# Patient Record
Sex: FEMALE | Race: WHITE | Employment: UNEMPLOYED | URBAN - METROPOLITAN AREA
[De-identification: names, ages, dates, MRNs, and addresses within clinical notes are randomized per-mention and may not be internally consistent; named-entity substitution may affect disease eponyms.]

---

## 2017-03-22 NOTE — TELEPHONE ENCOUNTER
Pending Prescriptions Disp Refills    PANTOPRAZOLE SODIUM 40 MG Oral Tab EC [Pharmacy Med Name: PANTOPRAZOLE 40MG TABLETS] 30 tablet 0     Sig: TAKE 1 TABLET BY MOUTH DAILY         See refill request  Patient last seen 5-27-16.    Medication last refilled

## 2017-03-24 RX ORDER — PANTOPRAZOLE SODIUM 40 MG/1
TABLET, DELAYED RELEASE ORAL
Qty: 30 TABLET | Refills: 3 | Status: SHIPPED | OUTPATIENT
Start: 2017-03-24 | End: 2017-10-08

## 2017-04-07 ENCOUNTER — TELEPHONE (OUTPATIENT)
Dept: GASTROENTEROLOGY | Facility: CLINIC | Age: 57
End: 2017-04-07

## 2017-04-07 NOTE — TELEPHONE ENCOUNTER
Pt. States that she is having abdominal pain, pressure on R side of Stomach and burning sensation for the past 2 weeks. Pt. Wants to know what should she do?

## 2017-04-10 ENCOUNTER — OFFICE VISIT (OUTPATIENT)
Dept: INTERNAL MEDICINE CLINIC | Facility: CLINIC | Age: 57
End: 2017-04-10

## 2017-04-10 ENCOUNTER — LAB ENCOUNTER (OUTPATIENT)
Dept: LAB | Facility: HOSPITAL | Age: 57
End: 2017-04-10
Attending: INTERNAL MEDICINE
Payer: COMMERCIAL

## 2017-04-10 VITALS
WEIGHT: 166.63 LBS | DIASTOLIC BLOOD PRESSURE: 72 MMHG | BODY MASS INDEX: 29.52 KG/M2 | SYSTOLIC BLOOD PRESSURE: 114 MMHG | TEMPERATURE: 99 F | HEIGHT: 63 IN | HEART RATE: 66 BPM

## 2017-04-10 DIAGNOSIS — G47.30 SLEEP APNEA, UNSPECIFIED TYPE: ICD-10-CM

## 2017-04-10 DIAGNOSIS — Z00.00 ANNUAL PHYSICAL EXAM: Primary | ICD-10-CM

## 2017-04-10 DIAGNOSIS — Z00.00 ANNUAL PHYSICAL EXAM: ICD-10-CM

## 2017-04-10 PROCEDURE — 36415 COLL VENOUS BLD VENIPUNCTURE: CPT

## 2017-04-10 PROCEDURE — 84443 ASSAY THYROID STIM HORMONE: CPT

## 2017-04-10 PROCEDURE — 80061 LIPID PANEL: CPT

## 2017-04-10 PROCEDURE — 85025 COMPLETE CBC W/AUTO DIFF WBC: CPT

## 2017-04-10 PROCEDURE — 82306 VITAMIN D 25 HYDROXY: CPT

## 2017-04-10 PROCEDURE — 99396 PREV VISIT EST AGE 40-64: CPT | Performed by: INTERNAL MEDICINE

## 2017-04-10 PROCEDURE — 80053 COMPREHEN METABOLIC PANEL: CPT

## 2017-04-10 NOTE — PROGRESS NOTES
HPI:    Patient ID: Will Ling is a 61year old female. HPI  Annual Physical Exam  Patient with hx of GERD presents for an annual physical exam. She c/o low back pain, and lumbar disc bulging was noted on her MRI lumbar spine.  Currently taking Tramadol Pain. Disp: 30 tablet Rfl: 0   Pantoprazole Sodium (PROTONIX) 40 MG Oral Tab EC Take 1 tablet by mouth every morning before breakfast. Disp: 30 tablet Rfl: 1     Allergies:No Known Allergies   PHYSICAL EXAM:   Physical Exam   Constitutional: She is oriente mild left and moderate right neural foraminal narrowing. L4-L5:   Mild diffuse disc bulge, facet degenerative changes and ligamentum flavum hypertrophy causes mild flattening of the ventral thecal sac and mild right neural foraminal narrowing.  No signific

## 2017-04-11 NOTE — TELEPHONE ENCOUNTER
Pt contacted and she states that for the last 2 weeks she has had moderate abdominal pain, bloating, pressure, and nausea. She states it is a dull intermittent pain on the right side of her abdomen radiating to her back.  Denies fever, vomiting, constipatio

## 2017-04-12 ENCOUNTER — OFFICE VISIT (OUTPATIENT)
Dept: GASTROENTEROLOGY | Facility: CLINIC | Age: 57
End: 2017-04-12

## 2017-04-12 VITALS
HEART RATE: 77 BPM | BODY MASS INDEX: 28.67 KG/M2 | SYSTOLIC BLOOD PRESSURE: 134 MMHG | WEIGHT: 170 LBS | HEIGHT: 64.5 IN | DIASTOLIC BLOOD PRESSURE: 83 MMHG

## 2017-04-12 DIAGNOSIS — R10.11 RUQ ABDOMINAL PAIN: Primary | ICD-10-CM

## 2017-04-12 DIAGNOSIS — R10.13 EPIGASTRIC ABDOMINAL PAIN: ICD-10-CM

## 2017-04-12 DIAGNOSIS — R74.8 ABNORMAL TRANSAMINASES: ICD-10-CM

## 2017-04-12 DIAGNOSIS — R11.0 NAUSEA: ICD-10-CM

## 2017-04-12 DIAGNOSIS — R14.0 ABDOMINAL BLOATING: ICD-10-CM

## 2017-04-12 PROCEDURE — 99212 OFFICE O/P EST SF 10 MIN: CPT | Performed by: INTERNAL MEDICINE

## 2017-04-12 PROCEDURE — 99244 OFF/OP CNSLTJ NEW/EST MOD 40: CPT | Performed by: INTERNAL MEDICINE

## 2017-04-12 RX ORDER — CIPROFLOXACIN 500 MG/1
500 TABLET, FILM COATED ORAL 2 TIMES DAILY
Qty: 14 TABLET | Refills: 0 | Status: SHIPPED | OUTPATIENT
Start: 2017-04-12 | End: 2017-04-19

## 2017-04-12 RX ORDER — METRONIDAZOLE 500 MG/1
500 TABLET ORAL EVERY 8 HOURS
Qty: 21 TABLET | Refills: 0 | Status: SHIPPED | OUTPATIENT
Start: 2017-04-12 | End: 2017-04-19

## 2017-04-12 NOTE — PROGRESS NOTES
HPI:    Patient ID: Sravan Reeder returns today for follow-up. Overall, she states that she is suffering about the same symptoms.     Most concerned with an epigastric discomfort that radiates around the base of the right rib cage to the right upper quadran appt regarding her sxs. Per Erwin Danielson OK to add 4/12/17 @12:30pm, pt confirmed appt.              ======================  Previous visit May 27th 2016:    Clyde Mcardle returns for follow-up of another episode of intense right sided abdominal pain.   This is sim All    Message left for pt to call back.                   Moris Fisher MD at 4/21/2016  7:56 PM      Status: Signed : Moris Fisher MD (Physician)     Expand All Collapse All    GI RNs–  these complaints have been repeatedly \"pinching\" pain. This pain is aggravated by fasting. She is avoiding spices and has stopped drinking coffee. Unclear if those things aggravated this.     No nausea or vomiting.      ============================    Call Documentation      [facility] [employer] continues. Some control on the pantoprazole which is only partial.  She takes the pantoprazole early in the mornings. Some nocturnal symptoms. She is watching diet and trying to push water. No dysphagia.     As below, nonsmoker.    ===================== cholecystectomy in May 2014     MRCP FINDINGS:   Motion artifact is present on multiple sequences, severely affecting the   postcontrast sequence image quality. GALLBLADDER: Absent.    BILE DUCTS: Common bile duct measures approximately 6-7 mm near the DIAGNOSES:  1. Right upper quadrant pain. 2. Burning acid dyspepsia. POSTOPERATIVE DIAGNOSIS: Nonspecific gastritis. SURGEON: Desmond Gutiérrez MD    SEDATION:  1. Benzocaine topical spray to the oropharynx before the procedure.   2. Fentanyl 75 m MOUTH DAILY Disp: 30 tablet Rfl: 3   TraMADol HCl 50 MG Oral Tab Take 1 tablet (50 mg total) by mouth 2 (two) times daily as needed for Pain.  Disp: 30 tablet Rfl: 0     Allergies:No Known Allergies   PHYSICAL EXAM:   Physical Exam   Constitutional: She is liver sounds like it is at least a component here. Normal kidney but some concern for proteinuria. Burning epigastric abdominal pain continues, partially improved on pantoprazole. No dysphagia.     Reassuring EGD examination with biopsies April 25, 201 Continue to watch closely. Further hepatitis workup to follow if LFTs remain elevated. 6.  Last colonoscopy examination 2009 apparently unremarkable. Our next test would be a repeat colonoscopy exam if no further improvement.         Over 25 minutes sp

## 2017-04-13 ENCOUNTER — TELEPHONE (OUTPATIENT)
Dept: GASTROENTEROLOGY | Facility: CLINIC | Age: 57
End: 2017-04-13

## 2017-04-13 NOTE — TELEPHONE ENCOUNTER
Pt is made aware of authorization for MRI/MRCP not being completed yet and that she will receive call from [facility] dept once it is authorized; pt verbalized understanding of this.

## 2017-04-20 ENCOUNTER — TELEPHONE (OUTPATIENT)
Dept: GASTROENTEROLOGY | Facility: CLINIC | Age: 57
End: 2017-04-20

## 2017-04-20 NOTE — TELEPHONE ENCOUNTER
Pt called to check on status of prior auth for MRI/MRCP. She states that she spoke to managed care and was told that they did not handle this and to call this office. Please call.   Pt is very anxious to schedule test.

## 2017-04-20 NOTE — TELEPHONE ENCOUNTER
Routed to [place] in [facility] care who was working on this. States pending in referral.  Want to know there is anything RNs need to do?

## 2017-04-21 NOTE — TELEPHONE ENCOUNTER
Pt is notified re: authorization that is still pending due to insurance co.; she is made aware that she will be notified with approval once this occurs; pt verbalized understanding of this.

## 2017-04-21 NOTE — TELEPHONE ENCOUNTER
Confirmed [facility] is working on this. Pt was informed incorrectly. [facility] in [facility] care states precert is pending with [employer] (insurance diagnostic precert line). Managed care WILL call pt once approval is obtained.   Await determination from insurance

## 2017-04-27 NOTE — TELEPHONE ENCOUNTER
Per Estrella Rangel in managed care, MRI/MRCP was approved to be done at [address] [place] [address]; authorization expires on 5/14/17 and pt has appt scheduled for 5/8/17.

## 2017-05-08 ENCOUNTER — HOSPITAL ENCOUNTER (OUTPATIENT)
Dept: MRI IMAGING | Facility: HOSPITAL | Age: 57
Discharge: HOME | End: 2017-05-08
Attending: INTERNAL MEDICINE
Payer: COMMERCIAL

## 2017-05-08 DIAGNOSIS — R10.11 RUQ ABDOMINAL PAIN: ICD-10-CM

## 2017-05-08 DIAGNOSIS — R74.8 ABNORMAL TRANSAMINASES: ICD-10-CM

## 2017-05-08 PROCEDURE — 74183 MRI ABD W/O CNTR FLWD CNTR: CPT | Performed by: INTERNAL MEDICINE

## 2017-05-08 PROCEDURE — A9575 INJ GADOTERATE MEGLUMI 0.1ML: HCPCS | Performed by: INTERNAL MEDICINE

## 2017-05-15 ENCOUNTER — TELEPHONE (OUTPATIENT)
Dept: GASTROENTEROLOGY | Facility: CLINIC | Age: 57
End: 2017-05-15

## 2017-05-15 NOTE — TELEPHONE ENCOUNTER
Pt contacted and reviewed Dr. Orion Monson message below, she verbalized understanding. She states the the ax from 4/12/17 did NOT help her sxs and she is still experiencing \"swelling and pain\" in the LUQ and pressure.      She is agreeable to repeating LFT

## 2017-05-15 NOTE — TELEPHONE ENCOUNTER
GI RNs–  Please call and advise MsJeanette Darien Rebecca that the MRI scan of 5/8/2017 looked good. No tumors in her liver. Likely fatty liver only. No gallstones seen in her bile ducts.   There may be mild pressure or dilation in her bile ducts of uncertain significanc

## 2017-06-20 ENCOUNTER — APPOINTMENT (OUTPATIENT)
Dept: LAB | Facility: HOSPITAL | Age: 57
End: 2017-06-20
Attending: OBSTETRICS & GYNECOLOGY
Payer: COMMERCIAL

## 2017-06-20 ENCOUNTER — OFFICE VISIT (OUTPATIENT)
Dept: OBGYN CLINIC | Facility: CLINIC | Age: 57
End: 2017-06-20

## 2017-06-20 VITALS
DIASTOLIC BLOOD PRESSURE: 85 MMHG | SYSTOLIC BLOOD PRESSURE: 129 MMHG | WEIGHT: 170 LBS | BODY MASS INDEX: 28.32 KG/M2 | HEIGHT: 65 IN

## 2017-06-20 DIAGNOSIS — R30.0 DYSURIA: ICD-10-CM

## 2017-06-20 DIAGNOSIS — Z01.419 ENCOUNTER FOR GYNECOLOGICAL EXAMINATION WITHOUT ABNORMAL FINDING: ICD-10-CM

## 2017-06-20 DIAGNOSIS — D25.0 SUBMUCOUS LEIOMYOMA OF UTERUS: ICD-10-CM

## 2017-06-20 DIAGNOSIS — Z12.31 VISIT FOR SCREENING MAMMOGRAM: Primary | ICD-10-CM

## 2017-06-20 PROCEDURE — 81003 URINALYSIS AUTO W/O SCOPE: CPT

## 2017-06-20 PROCEDURE — 87086 URINE CULTURE/COLONY COUNT: CPT

## 2017-06-20 PROCEDURE — 99396 PREV VISIT EST AGE 40-64: CPT | Performed by: OBSTETRICS & GYNECOLOGY

## 2017-06-20 NOTE — PROGRESS NOTES
HPI:    Patient ID: Gurvinder Sparks is a 61year old female. HPI  Patient here for routine exam.  Reports pelvic pressure recently and urge to urinate frequently. Discussed obtaining U/A and C & S as well as pelvic U/S. Patient has a H/O fibroid uterus.   L Fibroids. Size ~ 12 weeks. ASSESSMENT/PLAN:   Visit for screening mammogram  (primary encounter diagnosis)  Submucous leiomyoma of uterus  Dysuria  Encounter for gynecological examination without abnormal finding [z01.419]   F/U Labs.   Carlos Alberto Harrison

## 2017-06-24 ENCOUNTER — TELEPHONE (OUTPATIENT)
Dept: OBGYN CLINIC | Facility: CLINIC | Age: 57
End: 2017-06-24

## 2017-06-24 NOTE — TELEPHONE ENCOUNTER
Pt had urine test done by dr Smith April on 6/20/17 in [facility]  Is calling about results

## 2017-07-10 ENCOUNTER — HOSPITAL ENCOUNTER (OUTPATIENT)
Dept: ULTRASOUND IMAGING | Facility: HOSPITAL | Age: 57
Discharge: HOME | End: 2017-07-10
Attending: OBSTETRICS & GYNECOLOGY
Payer: COMMERCIAL

## 2017-07-10 ENCOUNTER — HOSPITAL ENCOUNTER (OUTPATIENT)
Dept: MAMMOGRAPHY | Facility: HOSPITAL | Age: 57
Discharge: HOME | End: 2017-07-10
Attending: OBSTETRICS & GYNECOLOGY
Payer: COMMERCIAL

## 2017-07-10 DIAGNOSIS — Z12.31 VISIT FOR SCREENING MAMMOGRAM: ICD-10-CM

## 2017-07-10 DIAGNOSIS — D25.0 SUBMUCOUS LEIOMYOMA OF UTERUS: ICD-10-CM

## 2017-07-10 PROCEDURE — 76856 US EXAM PELVIC COMPLETE: CPT | Performed by: OBSTETRICS & GYNECOLOGY

## 2017-07-10 PROCEDURE — 76830 TRANSVAGINAL US NON-OB: CPT | Performed by: OBSTETRICS & GYNECOLOGY

## 2017-07-10 PROCEDURE — 77067 SCR MAMMO BI INCL CAD: CPT | Performed by: OBSTETRICS & GYNECOLOGY

## 2017-07-28 ENCOUNTER — OFFICE VISIT (OUTPATIENT)
Dept: GASTROENTEROLOGY | Facility: CLINIC | Age: 57
End: 2017-07-28

## 2017-07-28 VITALS
SYSTOLIC BLOOD PRESSURE: 104 MMHG | HEART RATE: 79 BPM | HEIGHT: 63 IN | DIASTOLIC BLOOD PRESSURE: 71 MMHG | BODY MASS INDEX: 30.48 KG/M2 | WEIGHT: 172 LBS

## 2017-07-28 DIAGNOSIS — R74.8 ABNORMAL TRANSAMINASES: Primary | ICD-10-CM

## 2017-07-28 DIAGNOSIS — K75.81 NONALCOHOLIC STEATOHEPATITIS (NASH): ICD-10-CM

## 2017-07-28 DIAGNOSIS — R14.0 ABDOMINAL BLOATING: ICD-10-CM

## 2017-07-28 PROCEDURE — 99212 OFFICE O/P EST SF 10 MIN: CPT | Performed by: INTERNAL MEDICINE

## 2017-07-28 PROCEDURE — 99214 OFFICE O/P EST MOD 30 MIN: CPT | Performed by: INTERNAL MEDICINE

## 2017-07-28 NOTE — PROGRESS NOTES
HPI:    Patient ID: Mariama Cook returns today for follow-up to discuss recent MRI and labs. [facility] actually is doing a bit better. She states that she has made changes to her diet.   Previous complaints of burning epigastric discomfort and abdominal bloat at times. Symptoms are relieved by taking smaller portions with meals. There is a third complaint of a more lower abdominal bloating discomfort at night. This is only nocturnal.    This syndrome of burning pain and bloating comes in episodes.   It is not acknowledge being all that bloated or distended when this occurred. Normal bowel patterns during the episode. No constipation or diarrhea. This lasted for about 2 weeks. She cut back considerably on her diet.   This seemed to resolve spontaneous Courtney:  Pt stats that she has been having intermittent RUQ pain for the past 2 weeks.  She also has mid abd \"burning\" after meals.  She states you saw her in the past for this and is taking Pantoprazole.  She would like to be seen soon.  She was advised Marsha Taylor at 12/14/2015  8:58 AM      Status: Signed : Nathanael Garduno     Expand All Collapse All    Pt states she is having abdominal pain on right side for last 2 wks and she is also having heartburn.  Pt is asking for an appt sooner than first av Negative intraoperative cholangiogram.  She made an uneventful recovery. Ms. Tuyet Malin comes in for evaluation of about 1 month of burning, pinching epigastric right upper quadrant and diffuse upper abdominal discomfort and bloating.   No obvious aggravating intraoperative cholangiogram performed   at the time of cholecystectomy and appears separate   from the biliary ductal system on multiple images.  The   post contrast sequences are limited by motion artifact   but no definite internal enhancement is identif Pancreatic ductal dilatation. [this is a typo - d/w Dr Miranda Abdalla 7/28/17 - was meant to read NO panc ductal dilation.]  No divisum. Normal intrinsic T1 signal of the pancreas, without atrophy, enhancing lesion, or evidence of acute pancreatitis.      OTHER Desmond Allison MD    SEDATION:  1. Benzocaine topical spray to the oropharynx before the procedure. 2. Fentanyl 75 mcg and midazolam 4 mg given intravenously before the  procedure.       EGD FINDINGS: The esophagus was healthy and normal down to th MOUTH DAILY Disp: 30 tablet Rfl: 3     Allergies:No Known Allergies   PHYSICAL EXAM:   Physical Exam   Constitutional: She is oriented to person, place, and time. She appears well-developed. Well-dressed. Looks great. HENT:   Head: Normocephalic.    Ey here.  Normal kidney but some concern for proteinuria. Burning epigastric abdominal pain continues, partially improved on pantoprazole. No dysphagia. Reassuring EGD examination with biopsies April 25, 2015. No H. pylori.     Returns for follow-up Ibis (7/27/2017). 6.  Continue to monitor LFTs once or twice per year. Suspect fatty liver developing into QUIROZ. Continue to watch closely. Further hepatitis workup to follow if LFTs remain elevated.     Monitor weights, consider referral to bariatric clin

## 2017-08-14 ENCOUNTER — OFFICE VISIT (OUTPATIENT)
Dept: OBGYN CLINIC | Facility: CLINIC | Age: 57
End: 2017-08-14

## 2017-08-14 VITALS — SYSTOLIC BLOOD PRESSURE: 108 MMHG | DIASTOLIC BLOOD PRESSURE: 64 MMHG

## 2017-08-14 DIAGNOSIS — R35.0 URINARY FREQUENCY: ICD-10-CM

## 2017-08-14 DIAGNOSIS — D25.1 INTRAMURAL LEIOMYOMA OF UTERUS: Primary | ICD-10-CM

## 2017-08-14 DIAGNOSIS — R10.2 PELVIC PAIN IN FEMALE: ICD-10-CM

## 2017-08-14 PROCEDURE — 99214 OFFICE O/P EST MOD 30 MIN: CPT | Performed by: OBSTETRICS & GYNECOLOGY

## 2017-08-14 RX ORDER — TRAMADOL HYDROCHLORIDE 50 MG/1
50 TABLET ORAL 2 TIMES DAILY PRN
Qty: 30 TABLET | Refills: 0 | Status: SHIPPED | COMMUNITY
Start: 2017-08-14 | End: 2018-04-16

## 2017-08-14 NOTE — PROGRESS NOTES
HPI:    Patient ID: Victorino Bay is a 61year old female. HPI    Patient here for F/U visit to discuss pelvic U/S results. U/S shows a large 6-7 cm fibroid uterus. Patient's symptoms of pelvic pressure and pain as well as urinary frequency continue.   U/ detail. To See Urologist for evaluation and possible treatment. Medication refilled and reviewed. No orders of the defined types were placed in this encounter.       Meds This Visit:  Signed Prescriptions Disp Refills    TraMADol HCl 50 MG Oral Tab 30

## 2017-08-29 ENCOUNTER — TELEPHONE (OUTPATIENT)
Dept: GASTROENTEROLOGY | Facility: CLINIC | Age: 57
End: 2017-08-29

## 2017-08-29 NOTE — TELEPHONE ENCOUNTER
Mailed letter to patient notifying him of overdue labs (Hepatic function test) Ordered 7-28-17. Overdue letter mailed to patient.

## 2017-10-09 NOTE — TELEPHONE ENCOUNTER
Pending Prescriptions Disp Refills    PANTOPRAZOLE SODIUM 40 MG Oral Tab EC [Pharmacy Med Name: PANTOPRAZOLE 40MG TABLETS] 30 tablet 0     Sig: TAKE 1 TABLET BY MOUTH DAILY         See refill request  Patient last seen 7-28-17.    Medication last refilled 3-24-17

## 2017-10-10 RX ORDER — PANTOPRAZOLE SODIUM 40 MG/1
TABLET, DELAYED RELEASE ORAL
Qty: 30 TABLET | Refills: 11 | Status: SHIPPED | OUTPATIENT
Start: 2017-10-10 | End: 2020-08-05

## 2017-10-16 ENCOUNTER — APPOINTMENT (OUTPATIENT)
Dept: LAB | Facility: HOSPITAL | Age: 57
End: 2017-10-16
Attending: INTERNAL MEDICINE
Payer: COMMERCIAL

## 2017-10-16 DIAGNOSIS — R74.8 ABNORMAL TRANSAMINASES: ICD-10-CM

## 2017-10-16 PROCEDURE — 36415 COLL VENOUS BLD VENIPUNCTURE: CPT

## 2017-10-16 PROCEDURE — 80076 HEPATIC FUNCTION PANEL: CPT

## 2017-11-01 ENCOUNTER — TELEPHONE (OUTPATIENT)
Dept: GASTROENTEROLOGY | Facility: CLINIC | Age: 57
End: 2017-11-01

## 2017-11-01 DIAGNOSIS — R79.89 ELEVATED LFTS: Primary | ICD-10-CM

## 2017-11-01 NOTE — TELEPHONE ENCOUNTER
Dr. Kash Nunez note from 7/28/17    \"Continue to monitor LFTs once or twice per year. Suspect fatty liver developing into QUIROZ. Continue to watch closely. Further hepatitis workup to follow if LFTs remain elevated. \"    \"Monitor weights, consider referral

## 2017-12-04 ENCOUNTER — OFFICE VISIT (OUTPATIENT)
Dept: GASTROENTEROLOGY | Facility: CLINIC | Age: 57
End: 2017-12-04

## 2017-12-04 VITALS
BODY MASS INDEX: 29.67 KG/M2 | HEIGHT: 64 IN | SYSTOLIC BLOOD PRESSURE: 114 MMHG | WEIGHT: 173.81 LBS | DIASTOLIC BLOOD PRESSURE: 78 MMHG

## 2017-12-04 DIAGNOSIS — R10.13 DYSPEPSIA: ICD-10-CM

## 2017-12-04 DIAGNOSIS — R14.0 ABDOMINAL BLOATING: ICD-10-CM

## 2017-12-04 DIAGNOSIS — K75.81 NONALCOHOLIC STEATOHEPATITIS (NASH): Primary | ICD-10-CM

## 2017-12-04 PROCEDURE — 99212 OFFICE O/P EST SF 10 MIN: CPT | Performed by: INTERNAL MEDICINE

## 2017-12-04 PROCEDURE — 99214 OFFICE O/P EST MOD 30 MIN: CPT | Performed by: INTERNAL MEDICINE

## 2017-12-04 NOTE — PROGRESS NOTES
HPI:    Patient ID: Clarisa Smith returns today for follow-up. Fredo Vidales looks and sounds just beautiful today. She is just coming off of a vacation and looks like she had a nice time. She has been feeling better.   The abdominal symptoms, bloating continue 4/12/2017:    Monica Korin returns today for follow-up. Overall, she states that she is suffering about the same symptoms.     Most concerned with an epigastric discomfort that radiates around the base of the right rib cage to the right upper quadrant and Jasmyn Arrieta OK to add 4/12/17 @12:30pm, pt confirmed appt.    ======================  Previous visit May 27th 2016:    Vernon Beckham returns for follow-up of another episode of intense right sided abdominal pain. This is similar to previous episodes.     Ms. Rashel Padilla back.                   Devika Mcconnell MD at 4/21/2016  7:56 PM      Status: Signed : Devika Mcconnell MD (Physician)     Expand All Collapse All    GI RNs–  these complaints have been repeatedly discussed in the office and repeat aggravated by fasting. She is avoiding spices and has stopped drinking coffee. Unclear if those things aggravated this.     No nausea or vomiting.      ============================    Call Documentation      Rudi Schilder, RN at 12/14/2015  9:18 AM      pantoprazole which is only partial.  She takes the pantoprazole early in the mornings. Some nocturnal symptoms. She is watching diet and trying to push water. No dysphagia.     As below, nonsmoker.    =============================    Previous office visi MRCP FINDINGS:   Motion artifact is present on multiple sequences, severely affecting the   postcontrast sequence image quality. GALLBLADDER: Absent. BILE DUCTS: Common bile duct measures approximately 6-7 mm near the   chepe hepatis.  This tapers t [facility], [facility]. [facility], MRI MRCP ABDOMEN [address], 12/11/2014, 8:48. INDICATIONS:            Intermittent right upper quadrant abdominal pain for one month. Abnormal AST/ALT.  Post cholecystectomy May 2014 enlarged up to 8 mm. The appearance is very similar to prior MRI/MRCP from   12/11/14 suggesting chronic age-related and post cholecystectomy change. No choledocholithiasis.   2.  Stable cystic focus within the right hepatic lobe near the chepe hepatis wh likely cause for this patient's  right upper quadrant pain. D: 04/25/201510:32 A  T: 04/25/2015 4:22 P    ATTENDING: hCarmaine Sow MD      PATH: Biopsies and MARVIN test negative for H. Pylori      Abdominal Pain     Bloating   Associated symptoms cholecystectomy with intraoperative cholangiogram May 22, 2014 for symptomatic cholelithiasis.   Negative intraoperative cholangiogram.  She was seen 12/18/14 for about 1 month of burning, pinching epigastric and right upper quadrant abdominal pain, diffuse · NO Pancreatic ductal dilation. · Fatty liver. · Stable unchanged right lobe lesion possible hepatic cyst    Pelvic transvaginal ultrasound 7/10/2017 shows large 6+cm uterine fibroid    Returns for follow-up 7/27/2017 feeling subjectively better.   Lisa Mcbride

## 2017-12-04 NOTE — PROGRESS NOTES
no h/o head & neck cancers  no h/o difficult intubations  no h/o malignant hyperthermia  no psuedocholinesterase deficiencies  no h/o active illicit drug use/opioid abuse  no home O2  no h/o sleep apnea  no active alcoholics   no dialysis patients  no si

## 2018-04-16 ENCOUNTER — OFFICE VISIT (OUTPATIENT)
Dept: INTERNAL MEDICINE CLINIC | Facility: CLINIC | Age: 58
End: 2018-04-16

## 2018-04-16 ENCOUNTER — LAB ENCOUNTER (OUTPATIENT)
Dept: LAB | Facility: HOSPITAL | Age: 58
End: 2018-04-16
Attending: INTERNAL MEDICINE
Payer: COMMERCIAL

## 2018-04-16 VITALS
WEIGHT: 174 LBS | HEIGHT: 64 IN | TEMPERATURE: 98 F | SYSTOLIC BLOOD PRESSURE: 109 MMHG | BODY MASS INDEX: 29.71 KG/M2 | HEART RATE: 76 BPM | DIASTOLIC BLOOD PRESSURE: 75 MMHG

## 2018-04-16 DIAGNOSIS — M51.26 PROLAPSE OF LUMBAR INTERVERTEBRAL DISC WITHOUT RADICULOPATHY: Primary | ICD-10-CM

## 2018-04-16 DIAGNOSIS — Z00.00 ANNUAL PHYSICAL EXAM: ICD-10-CM

## 2018-04-16 DIAGNOSIS — K75.81 NONALCOHOLIC STEATOHEPATITIS (NASH): ICD-10-CM

## 2018-04-16 DIAGNOSIS — R73.03 PREDIABETES: ICD-10-CM

## 2018-04-16 DIAGNOSIS — R79.89 ELEVATED LFTS: ICD-10-CM

## 2018-04-16 PROCEDURE — 80050 GENERAL HEALTH PANEL: CPT

## 2018-04-16 PROCEDURE — 80053 COMPREHEN METABOLIC PANEL: CPT

## 2018-04-16 PROCEDURE — 82306 VITAMIN D 25 HYDROXY: CPT

## 2018-04-16 PROCEDURE — 82248 BILIRUBIN DIRECT: CPT

## 2018-04-16 PROCEDURE — 80061 LIPID PANEL: CPT

## 2018-04-16 PROCEDURE — 36415 COLL VENOUS BLD VENIPUNCTURE: CPT

## 2018-04-16 PROCEDURE — 99396 PREV VISIT EST AGE 40-64: CPT | Performed by: INTERNAL MEDICINE

## 2018-04-16 PROCEDURE — 83036 HEMOGLOBIN GLYCOSYLATED A1C: CPT

## 2018-04-16 PROCEDURE — 85025 COMPLETE CBC W/AUTO DIFF WBC: CPT

## 2018-04-16 PROCEDURE — 81015 MICROSCOPIC EXAM OF URINE: CPT

## 2018-04-16 RX ORDER — TRAMADOL HYDROCHLORIDE 50 MG/1
50 TABLET ORAL 2 TIMES DAILY PRN
Qty: 30 TABLET | Refills: 0 | Status: SHIPPED | OUTPATIENT
Start: 2018-04-16 | End: 2018-04-16

## 2018-04-16 RX ORDER — TRAMADOL HYDROCHLORIDE 50 MG/1
50 TABLET ORAL ONCE AS NEEDED
Qty: 30 TABLET | Refills: 0 | Status: SHIPPED | OUTPATIENT
Start: 2018-04-16 | End: 2018-04-16

## 2018-04-16 NOTE — ASSESSMENT & PLAN NOTE
Patient tries massaging, yoga, stretches and takes tramadol couple times a week  For break through pain. She takes ibuprofen 200 mg once a week. She is avoiding the tylenol due to QUIROZ. Giving referral for physiatry. Tramadol 50mg refilled# 30 tablets.

## 2018-04-16 NOTE — PROGRESS NOTES
Kathy Mccartney is a 60year old female. Patient presents with:  Physical      HPI:     HPI  Patient is here for annual physical.  She has history of GERD on pantoprazole, QUIROZ, intervertebral disc prolapse, lumbar foraminal narrowing and fibroid uterus.   She recent vision problems, blurry vision or double vision. HEENT: No decreased hearing, ear pain, nasal congestion or sore throat. SKIN: denies any unusual skin lesions or rashes. RESPIRATORY: denies shortness of breath, wheezing, cough.   CARDIOVASCULAR: d without radiculopathy - Primary     Patient tries massaging, yoga, stretches and takes tramadol couple times a week  For break through pain. She takes ibuprofen 200 mg once a week. She is avoiding the tylenol due to QUIROZ. Giving referral for physiatry.

## 2018-04-16 NOTE — ASSESSMENT & PLAN NOTE
Had mild elevation of LFT in 4/17. AST> ALT. LFTs in October 2017 was normal.  She lost 7 lbs in last 6 months. She is watching her diet, eating less fat. She follows up with GI-Dr. Yun Robles.

## 2018-04-17 NOTE — PROGRESS NOTES
Spoke with patient (identified name and ) ,results reviewed and agrees with  plan.     Notes recorded by Gregory Campos MD on 2018 at 3:36 PM CDT  Please inform the patient that the hemoglobin A1c is the prediabetic range 6.4.  Ideally it should be l

## 2018-05-18 ENCOUNTER — OFFICE VISIT (OUTPATIENT)
Dept: GASTROENTEROLOGY | Facility: CLINIC | Age: 58
End: 2018-05-18

## 2018-05-18 VITALS
SYSTOLIC BLOOD PRESSURE: 120 MMHG | DIASTOLIC BLOOD PRESSURE: 79 MMHG | BODY MASS INDEX: 30.83 KG/M2 | HEART RATE: 73 BPM | HEIGHT: 63 IN | WEIGHT: 174 LBS

## 2018-05-18 DIAGNOSIS — K75.81 NONALCOHOLIC STEATOHEPATITIS (NASH): ICD-10-CM

## 2018-05-18 DIAGNOSIS — R10.11 RUQ ABDOMINAL PAIN: ICD-10-CM

## 2018-05-18 DIAGNOSIS — K76.0 FATTY LIVER DISEASE, NONALCOHOLIC: Primary | ICD-10-CM

## 2018-05-18 PROCEDURE — 99214 OFFICE O/P EST MOD 30 MIN: CPT | Performed by: INTERNAL MEDICINE

## 2018-05-18 PROCEDURE — 99212 OFFICE O/P EST SF 10 MIN: CPT | Performed by: INTERNAL MEDICINE

## 2018-05-18 RX ORDER — TRAMADOL HYDROCHLORIDE 50 MG/1
50 TABLET ORAL EVERY 6 HOURS PRN
COMMUNITY
End: 2020-08-05

## 2018-05-18 NOTE — PROGRESS NOTES
HPI:    Patient ID: Romulo Durham returns today for follow-up. She has been coming in about every 6 months, but coincidentally she had an episode of fairly severe pain 3 weeks ago and comes in at the 6 month tracie for acute follow-up.     3 weeks ago, she desc She does believe that this is related to triggers including larger heavier meals. She states that when she is more attentive to what she eats, the symptoms are better.       Rosemary Hoskins takes the pantoprazole on an as-needed basis and she is doing very well wit describes a burning epigastric abdominal pain. The pain around the base of her right rib cage and right upper quadrant is more of a pressure, bloating discomfort. Burning and bloating are thus her chief complaints.     Weight is stable, but describes de right upper quadrant \"swollen\" pain. This was a distended sensation which also was burning in nature. She felt this distended, painful sensation radiating to the back.   However, even while this was going on, she denies any tenderness in the upper abdom can fit Ms. Clarissa Escobedo in the next 2-4 weeks.  I agree with sending to the emergency room if symptoms are severe.                   Radames Huff RN at 4/21/2016  3:45 PM      Status: Signed : Radames Huff RN (Registered Nurse)     Expand All Nurse)     Expand All Collapse All    Pt is contacted re: appt for today at 1130 at [facility] with Dr. Anirudh Gallego; she is agreeable with this and is provided with directions to [facility] about which she verbalized understanding; she is advised to arrive at [phone].     who is referred by Dr. Lord Reyes for evaluation of burning epigastric and right upper quadrant pain for about 1 month.     Ms. Nilo Arias recently underwent laparoscopic cholecystectomy with a negative intraoperative cholangiogram by Dr. Munira Mcfadden May 22, 2014 f to suggest   choledocholithiasis. PANCREAS: No fluid collection, ductal dilatation, or significant   atrophy. MRI ABDOMEN FINDINGS:   LIVER: There is a 7 x 9 mm T2 hyperintensity which is adjacent   to the proximal right intrahepatic bile duct.  This ar cholecystectomy. BILE DUCTS:              There is moderate central intrahepatic biliary dilatation. Common bile duct is enlarged at 8 L. The bile duct shows normal smooth tapering at ampulla without evidence of filling defect, stenosis, or occlusion. Hepatic steatosis.         =======================    Patient: Jessie Gilmore  EGD PROCEDURE REPORT  DATE OF SURGERY: 4/25/2015    PREOPERATIVE DIAGNOSES:  1. Right upper quadrant pain. 2. Burning acid dyspepsia.     POSTOPERATIVE DIAGNOSIS: Nonspecific gas Positive for abdominal pain and bloating. Current Outpatient Prescriptions:  TraMADol HCl 50 MG Oral Tab Take 50 mg by mouth every 6 (six) hours as needed for Pain.  Disp:  Rfl:    PANTOPRAZOLE SODIUM 40 MG Oral Tab EC TAKE 1 TABLET BY MOUTH BELEM EGD examination apparently normal about 7 years prior. Previous colonoscopy examination apparently negative about 5 years ago. Reassuring MRI/MRCP exam 12/2014 showing significant fatty liver disease only, normal kidneys.     On follow-up April 6, 2015jennie labs.    Started vitamin E therapy as of visit of 7/27/2017. Returns for follow-up looking and sounding well 12/4/2017. LFTs October 2017 are perfect.     Returns for follow-up 5/18/2018 describing a three-day flareup of right upper quadrant and even le

## 2018-06-04 ENCOUNTER — OFFICE VISIT (OUTPATIENT)
Dept: NEUROLOGY | Facility: CLINIC | Age: 58
End: 2018-06-04

## 2018-06-04 VITALS
DIASTOLIC BLOOD PRESSURE: 62 MMHG | WEIGHT: 170 LBS | HEART RATE: 70 BPM | HEIGHT: 63 IN | BODY MASS INDEX: 30.12 KG/M2 | SYSTOLIC BLOOD PRESSURE: 106 MMHG

## 2018-06-04 DIAGNOSIS — M51.26 HERNIATED NUCLEUS PULPOSUS, L2-3 RIGHT: Primary | ICD-10-CM

## 2018-06-04 DIAGNOSIS — M51.26 PROLAPSE OF LUMBAR INTERVERTEBRAL DISC WITHOUT RADICULOPATHY: ICD-10-CM

## 2018-06-04 PROCEDURE — 99204 OFFICE O/P NEW MOD 45 MIN: CPT | Performed by: PHYSICAL MEDICINE & REHABILITATION

## 2018-06-04 NOTE — H&P
[facility] [address], [place] [address], [address], [facility]    History and Physical    Norva Drilling Patient Status:  No patient class for patient encounter    1960 MRN [id number]   Location [facility] [address], [place] [address], [facility] Biliary colic     acute   • Cholelithiasis    • Ectopic pregnancy 1995     Past Surgical History:  No date: CHOLECYSTECTOMY  2009: COLONOSCOPY  5/20/2014: ELECTROCARDIOGRAM, COMPLETE      Comment: scanned to media tab  2014: LAPAROSCOPY W CHOLANGIOGRAM sounds are normal.   Musculoskeletal:   Lumbar range of motion: No pain with flexion or extension    Palpation: Tenderness to palpation of the right L5 lumbar paraspinals. No appreciable step-offs.   No pain with palpation of the sacroiliac joints bilatera mass or lesion. LUMBAR DISC LEVELS:  L1-L2:   By diffuse disc bulge with superimposed tiny central disc protrusion causes minimal ventral thecal sac deformity without neural foraminal narrowing.   L2-L3:   Diffuse disc bulge causes moderate deformity of to have issues we can consider doing transforaminal epidural steroid injection versus pursuing other nonnarcotic medication options that are less likely to interfere with her acid reflux such as meloxicam.    Follow-up in 4 weeks as needed.     Evette Youssef

## 2018-09-04 ENCOUNTER — OFFICE VISIT (OUTPATIENT)
Dept: INTERNAL MEDICINE CLINIC | Facility: CLINIC | Age: 58
End: 2018-09-04
Payer: COMMERCIAL

## 2018-09-04 VITALS
BODY MASS INDEX: 30.94 KG/M2 | HEIGHT: 63 IN | SYSTOLIC BLOOD PRESSURE: 127 MMHG | HEART RATE: 76 BPM | DIASTOLIC BLOOD PRESSURE: 79 MMHG | WEIGHT: 174.63 LBS | TEMPERATURE: 98 F

## 2018-09-04 DIAGNOSIS — J06.9 UPPER RESPIRATORY TRACT INFECTION, UNSPECIFIED TYPE: ICD-10-CM

## 2018-09-04 DIAGNOSIS — Z12.39 SCREENING FOR BREAST CANCER: ICD-10-CM

## 2018-09-04 DIAGNOSIS — Z12.4 SCREENING FOR CERVICAL CANCER: Primary | ICD-10-CM

## 2018-09-04 PROCEDURE — 99213 OFFICE O/P EST LOW 20 MIN: CPT | Performed by: INTERNAL MEDICINE

## 2018-09-04 PROCEDURE — 99212 OFFICE O/P EST SF 10 MIN: CPT | Performed by: INTERNAL MEDICINE

## 2018-09-04 RX ORDER — BENZONATATE 200 MG/1
200 CAPSULE ORAL 3 TIMES DAILY PRN
Qty: 30 CAPSULE | Refills: 0 | Status: SHIPPED | OUTPATIENT
Start: 2018-09-04 | End: 2018-09-04

## 2018-09-04 RX ORDER — BENZONATATE 200 MG/1
200 CAPSULE ORAL 3 TIMES DAILY PRN
Qty: 30 CAPSULE | Refills: 0 | Status: SHIPPED | OUTPATIENT
Start: 2018-09-04 | End: 2018-09-14

## 2018-09-04 NOTE — PROGRESS NOTES
Emilia Sawyer is a 60year old female. Patient presents with:  Pap      HPI:     HPI   Patient is her with complaints of cough and requesting order for pap smear and for mammogram.  Cough started 2 weeks ago. Had PND. No fever, chills or SOB.  Sputum is yello Skin: Negative for rash. Endo/Heme/Allergies: Negative.           EXAM:   /79 (BP Location: Right arm, Cuff Size: adult)   Pulse 76   Temp 98 °F (36.7 °C) (Oral)   Ht 5' 3\" (1.6 m)   Wt 174 lb 9.6 oz (79.2 kg)   BMI 30.93 kg/m²   Physical Exam Problem List Items Addressed This Visit     None      Visit Diagnoses     Screening for cervical cancer    -  Primary    Relevant Orders    THINPREP PAP WITH HPV REFLEX REQUEST B    Screening for breast cancer        Relevant Orders    ANÍBAL SCREENING BI

## 2018-09-06 LAB — HPV I/H RISK 1 DNA SPEC QL NAA+PROBE: NEGATIVE

## 2018-09-07 ENCOUNTER — TELEPHONE (OUTPATIENT)
Dept: OTHER | Age: 58
End: 2018-09-07

## 2018-09-07 NOTE — TELEPHONE ENCOUNTER
Spoke with patient (identified name and ), results reviewed and agrees with plan.       Notes recorded by Tobias Alejandro on 2018 at 11:36 AM CDT  lmtcb   ------    Notes recorded by Lewis De La Fuente MD on 2018 at 4:42 PM CDT  Your Pap smear wi

## 2018-09-25 ENCOUNTER — HOSPITAL ENCOUNTER (OUTPATIENT)
Dept: MAMMOGRAPHY | Age: 58
Discharge: HOME | End: 2018-09-25
Attending: INTERNAL MEDICINE
Payer: COMMERCIAL

## 2018-09-25 DIAGNOSIS — Z12.39 SCREENING FOR BREAST CANCER: ICD-10-CM

## 2018-09-25 PROCEDURE — 77067 SCR MAMMO BI INCL CAD: CPT | Performed by: INTERNAL MEDICINE

## 2018-09-25 PROCEDURE — 77063 BREAST TOMOSYNTHESIS BI: CPT | Performed by: INTERNAL MEDICINE

## 2019-01-18 ENCOUNTER — APPOINTMENT (OUTPATIENT)
Dept: LAB | Facility: HOSPITAL | Age: 59
End: 2019-01-18
Attending: INTERNAL MEDICINE
Payer: COMMERCIAL

## 2019-01-18 DIAGNOSIS — K76.0 FATTY LIVER DISEASE, NONALCOHOLIC: ICD-10-CM

## 2019-01-18 DIAGNOSIS — R73.03 PREDIABETES: ICD-10-CM

## 2019-01-18 LAB
ALBUMIN SERPL BCP-MCNC: 3.9 G/DL (ref 3.5–4.8)
ALBUMIN/GLOB SERPL: 1.3 {RATIO} (ref 1–2)
ALP SERPL-CCNC: 76 U/L (ref 32–100)
ALT SERPL-CCNC: 37 U/L (ref 14–54)
ANION GAP SERPL CALC-SCNC: 8 MMOL/L (ref 0–18)
AST SERPL-CCNC: 37 U/L (ref 15–41)
BILIRUB SERPL-MCNC: 0.6 MG/DL (ref 0.3–1.2)
BUN SERPL-MCNC: 6 MG/DL (ref 8–20)
BUN/CREAT SERPL: 6.6 (ref 10–20)
CALCIUM SERPL-MCNC: 9.6 MG/DL (ref 8.5–10.5)
CHLORIDE SERPL-SCNC: 102 MMOL/L (ref 95–110)
CO2 SERPL-SCNC: 26 MMOL/L (ref 22–32)
CREAT SERPL-MCNC: 0.91 MG/DL (ref 0.5–1.5)
EST. AVERAGE GLUCOSE BLD GHB EST-MCNC: 146 MG/DL (ref 68–126)
GLOBULIN PLAS-MCNC: 3.1 G/DL (ref 2.5–3.7)
GLUCOSE SERPL-MCNC: 147 MG/DL (ref 70–99)
HBA1C MFR BLD HPLC: 6.7 % (ref ?–5.7)
OSMOLALITY UR CALC.SUM OF ELEC: 282 MOSM/KG (ref 275–295)
PATIENT FASTING: NO
POTASSIUM SERPL-SCNC: 3.9 MMOL/L (ref 3.3–5.1)
PROT SERPL-MCNC: 7 G/DL (ref 5.9–8.4)
SODIUM SERPL-SCNC: 136 MMOL/L (ref 136–144)

## 2019-01-18 PROCEDURE — 83036 HEMOGLOBIN GLYCOSYLATED A1C: CPT

## 2019-01-18 PROCEDURE — 80053 COMPREHEN METABOLIC PANEL: CPT

## 2019-01-18 PROCEDURE — 36415 COLL VENOUS BLD VENIPUNCTURE: CPT

## 2019-01-23 ENCOUNTER — OFFICE VISIT (OUTPATIENT)
Dept: GASTROENTEROLOGY | Facility: CLINIC | Age: 59
End: 2019-01-23
Payer: COMMERCIAL

## 2019-01-23 VITALS
WEIGHT: 174 LBS | SYSTOLIC BLOOD PRESSURE: 130 MMHG | HEART RATE: 71 BPM | BODY MASS INDEX: 30.83 KG/M2 | HEIGHT: 63 IN | DIASTOLIC BLOOD PRESSURE: 84 MMHG

## 2019-01-23 DIAGNOSIS — R10.13 EPIGASTRIC ABDOMINAL PAIN: ICD-10-CM

## 2019-01-23 DIAGNOSIS — K75.81 NONALCOHOLIC STEATOHEPATITIS (NASH): ICD-10-CM

## 2019-01-23 DIAGNOSIS — R10.11 RUQ ABDOMINAL PAIN: Primary | ICD-10-CM

## 2019-01-23 DIAGNOSIS — K83.8 DILATED BILE DUCT: ICD-10-CM

## 2019-01-23 PROCEDURE — 99214 OFFICE O/P EST MOD 30 MIN: CPT | Performed by: INTERNAL MEDICINE

## 2019-01-23 RX ORDER — CIPROFLOXACIN 500 MG/1
500 TABLET, FILM COATED ORAL 2 TIMES DAILY
Qty: 14 TABLET | Refills: 0 | Status: SHIPPED | OUTPATIENT
Start: 2019-01-23 | End: 2019-01-30

## 2019-01-23 RX ORDER — METRONIDAZOLE 500 MG/1
500 TABLET ORAL EVERY 8 HOURS
Qty: 21 TABLET | Refills: 0 | Status: SHIPPED | OUTPATIENT
Start: 2019-01-23 | End: 2019-01-30

## 2019-01-23 NOTE — PROGRESS NOTES
HPI:    Patient ID: Emma Barber returns today for follow-up. She is suffering another episode of the pain discussed during her previous visit in May.   It sounds like she actually did very well over the 6 months after the previous episode and office v identified triggers. Patient and her  are beginning to suspect that there may be some food triggers involved. Also coping with the stress of her mother suffering dementia. Sounds like that has been very upsetting recently.     Continues on the V discomfort and abdominal bloating are much better. Ongoing but tolerable. The abdominal bloating appears to be her chief concern. Trial of Cipro and Flagyl antibiotics last time had no impact on the symptoms.     New mild abnormality in hepatic transam bloating comes in episodes. It is on and off. These episodes appear to be increasing in duration. Current episode has been going on about 2 weeks. Pantoprazole does not obviously help.   She has been taking the pantoprazole for the past 2 weeks since Takes it when she gets sick with these episodes but not in between. Reassuring EGD examination April 2015 as below. Ms. Chuck Acosta has made a complete recovery. Bright, smiling with no complaints today.   She comes in for follow-up and to ask what else can b December 14, 2015:    Sonia Patel returns today for follow-up of similar symptoms. Last time, we performed EGD examination as below April 2015. Reassuring exam.  Histopathology and CLOtest were negative for H. pylori.     She did well after that on the pan cholelithiasis. Labs after the initial visit 12/2014 showed a negative H. pylori antibody and elevated blood glucose of greater than 140. Ms. Burlene Gowers reports recent concern for proteinuria. Extensive family history of diabetes. 2 concerns today:    1. subtle improvement on this but she is not yet satisfied. Dr. Nicolas Laws recently ordered MRI and MRCP, copy below. Ms. Magalys De Leon has been taking ibuprofen regularly for this pain.     Ms. Magalys De Leon reports unremarkable colonoscopy examination about 5 years ago, an only partially   imaged. No obstruction or focal bowel wall thickening. VASCULAR: Unremarkable. LYMPH NODES: No significantly enlarged lymph nodes. BONES: Suboptimally assessed by scan protocol but   unremarkable. OTHER: Negative.      CONCLUSION: or biliary diverticulum as seen on prior imaging from 2014. SPLEEN:                     Normal size. No suspicious lesion. ADRENALS:                No nodule or enlargement. KIDNEYS:                    No enhancing renal lesion or hydronephrosis.   OTHE gastric  antrum, pylorus, duodenal bulb, second portion of the duodenum were  otherwise normal. The scope was drawn back to the stomach, air and  secretions suctioned out, and the scope withdrawn from the patient.  She  tolerated this relatively brief proce Psychiatric: She has a normal mood and affect. Her behavior is normal.            ASSESSMENT/PLAN:   No diagnosis found. CMP 1/18/2019 shows AST 37, ALT 37, alk phosphatase 76, albumin 3.9.   Labs 4/16/2018 show AST 27 ALT 27 alk phos 85 total bilirubi better on over 2 weeks of pantoprazole therapy. Returns for follow-up May 27, 2016 for follow-up regarding an acute flare of severe right-sided abdominal pain radiating to the back lasting about 2 weeks.   She describes this as both a \"distended\" sensa of what may be a complex multifactorial pain remains uncertain. We have intense episodes of pain beyond the usual range of functional pain which does not respond to previous antibiotics or previous courses of PPI medication.   · Possibilities include funct clinic. We have previously discussed consideration for liver biopsy, which I did not recommend at this point with stable weight and normal LFTs    3. Last colonoscopy examination 2009 apparently unremarkable. Repeat that in 2019.   Defers scheduling colo

## 2019-02-04 ENCOUNTER — OFFICE VISIT (OUTPATIENT)
Dept: INTERNAL MEDICINE CLINIC | Facility: CLINIC | Age: 59
End: 2019-02-04
Payer: COMMERCIAL

## 2019-02-04 VITALS
TEMPERATURE: 98 F | DIASTOLIC BLOOD PRESSURE: 76 MMHG | HEIGHT: 63 IN | HEART RATE: 76 BPM | BODY MASS INDEX: 30.37 KG/M2 | WEIGHT: 171.38 LBS | SYSTOLIC BLOOD PRESSURE: 130 MMHG

## 2019-02-04 DIAGNOSIS — E11.9 TYPE 2 DIABETES MELLITUS WITHOUT COMPLICATION, WITHOUT LONG-TERM CURRENT USE OF INSULIN (HCC): ICD-10-CM

## 2019-02-04 DIAGNOSIS — K75.81 NONALCOHOLIC STEATOHEPATITIS (NASH): ICD-10-CM

## 2019-02-04 DIAGNOSIS — R73.03 PREDIABETES: Primary | ICD-10-CM

## 2019-02-04 PROCEDURE — 99214 OFFICE O/P EST MOD 30 MIN: CPT | Performed by: INTERNAL MEDICINE

## 2019-02-04 PROCEDURE — 99212 OFFICE O/P EST SF 10 MIN: CPT | Performed by: INTERNAL MEDICINE

## 2019-02-04 NOTE — ASSESSMENT & PLAN NOTE
New-onset diabetes. She has been in prediabetic range for 2 years. A1c went up from 6.4-6.7. Patient wants to try lifestyle modification and diet for next 3 months. Repeat labs in 3 months in follow-up.   Prescription for diabetic testing supplies and s

## 2019-02-04 NOTE — PROGRESS NOTES
Kelly Packer is a 60year old female. Patient presents with:  Abnormal Labs      HPI:     HPI  Patient is here to discuss the labs from 1/18/2019. Her hemoglobin A1c is 6.7. It has gone up from 6.4 in April.   She was in prediabetic range for couple year No      Alcohol/week: 0.0 oz    Drug use: No       REVIEW OF SYSTEMS:   Review of Systems   Constitutional: Negative for chills and fever. HENT: Negative for congestion, sinus pain and sore throat. Eyes: Negative. Negative for blurred vision.    Respi metformin. Follow up regularly  Needs good glycemic control to prevent complications of diabetes  Complications of diabetes include retinopathy, neuropathy, nephropathy and cardiovascular disease. Watch low carb diet.   Exercise regularly, aim for 40 sukhi

## 2019-02-04 NOTE — ASSESSMENT & PLAN NOTE
Reviewed recent labs. Normal LFTs. Follows up with GI-Dr. Silverio Diaz. Scheduled for MRCP on 2/9/19.   Patient having upper abdominal symptoms

## 2019-02-05 ENCOUNTER — TELEPHONE (OUTPATIENT)
Dept: INTERNAL MEDICINE CLINIC | Facility: CLINIC | Age: 59
End: 2019-02-05

## 2019-02-05 NOTE — TELEPHONE ENCOUNTER
Pharmacy called in stating that Pt brought in DME order for Diabetic testing supplies and they are unsure if they should use that as a script or if the office wants that to go through a DME company. Please advise.

## 2019-02-09 ENCOUNTER — HOSPITAL ENCOUNTER (OUTPATIENT)
Dept: MRI IMAGING | Facility: HOSPITAL | Age: 59
Discharge: HOME | End: 2019-02-09
Attending: INTERNAL MEDICINE
Payer: COMMERCIAL

## 2019-02-09 DIAGNOSIS — R10.13 EPIGASTRIC ABDOMINAL PAIN: ICD-10-CM

## 2019-02-09 DIAGNOSIS — R10.11 RUQ ABDOMINAL PAIN: ICD-10-CM

## 2019-02-09 DIAGNOSIS — K83.8 DILATED BILE DUCT: ICD-10-CM

## 2019-02-09 PROCEDURE — A9575 INJ GADOTERATE MEGLUMI 0.1ML: HCPCS | Performed by: INTERNAL MEDICINE

## 2019-02-09 PROCEDURE — 74183 MRI ABD W/O CNTR FLWD CNTR: CPT | Performed by: INTERNAL MEDICINE

## 2019-02-25 ENCOUNTER — TELEPHONE (OUTPATIENT)
Dept: GASTROENTEROLOGY | Facility: CLINIC | Age: 59
End: 2019-02-25

## 2019-02-25 NOTE — TELEPHONE ENCOUNTER
----- Message from Royal Parth MD sent at 2/24/2019  2:21 PM CST -----  GI RNs-  Please call Ms. Margarito Almodovar to advise that fortunately or unfortunately, the MRI scan on 2/9/2019 looked exactly the same as previous.   There is a question of mild press

## 2019-02-26 NOTE — TELEPHONE ENCOUNTER
I spoke to the pt. She was notified of the results of the MRI and Dr Valdez's recommendations and f/u instructions. We reviewed the date time and location of her f/u appt.     She had no further questions

## 2019-03-14 ENCOUNTER — HOSPITAL ENCOUNTER (OUTPATIENT)
Age: 59
Discharge: ANOTHER HOSPITAL | End: 2019-03-14
Attending: FAMILY MEDICINE
Payer: COMMERCIAL

## 2019-03-14 ENCOUNTER — APPOINTMENT (OUTPATIENT)
Dept: GENERAL RADIOLOGY | Age: 59
End: 2019-03-14
Attending: FAMILY MEDICINE
Payer: COMMERCIAL

## 2019-03-14 ENCOUNTER — APPOINTMENT (OUTPATIENT)
Dept: CT IMAGING | Facility: HOSPITAL | Age: 59
End: 2019-03-14
Attending: EMERGENCY MEDICINE
Payer: COMMERCIAL

## 2019-03-14 ENCOUNTER — NURSE TRIAGE (OUTPATIENT)
Dept: OTHER | Age: 59
End: 2019-03-14

## 2019-03-14 ENCOUNTER — HOSPITAL ENCOUNTER (EMERGENCY)
Facility: HOSPITAL | Age: 59
Discharge: HOME | End: 2019-03-14
Attending: EMERGENCY MEDICINE
Payer: COMMERCIAL

## 2019-03-14 VITALS
RESPIRATION RATE: 16 BRPM | DIASTOLIC BLOOD PRESSURE: 71 MMHG | TEMPERATURE: 99 F | HEART RATE: 65 BPM | BODY MASS INDEX: 30.12 KG/M2 | SYSTOLIC BLOOD PRESSURE: 118 MMHG | WEIGHT: 170 LBS | OXYGEN SATURATION: 97 % | HEIGHT: 63 IN

## 2019-03-14 VITALS
SYSTOLIC BLOOD PRESSURE: 148 MMHG | TEMPERATURE: 98 F | DIASTOLIC BLOOD PRESSURE: 87 MMHG | WEIGHT: 170 LBS | HEIGHT: 63 IN | HEART RATE: 76 BPM | RESPIRATION RATE: 18 BRPM | OXYGEN SATURATION: 99 % | BODY MASS INDEX: 30.12 KG/M2

## 2019-03-14 DIAGNOSIS — R51.9 ACUTE NONINTRACTABLE HEADACHE, UNSPECIFIED HEADACHE TYPE: Primary | ICD-10-CM

## 2019-03-14 DIAGNOSIS — S16.1XXA STRAIN OF NECK MUSCLE, INITIAL ENCOUNTER: ICD-10-CM

## 2019-03-14 DIAGNOSIS — G44.209 TENSION HEADACHE: Primary | ICD-10-CM

## 2019-03-14 LAB
#MXD IC: 0.6 X10ˆ3/UL (ref 0.1–1)
HCT VFR BLD AUTO: 45.6 % (ref 35–48)
HGB BLD-MCNC: 15 G/DL (ref 12–16)
LYMPHOCYTES # BLD AUTO: 2 X10ˆ3/UL (ref 1–4)
LYMPHOCYTES NFR BLD AUTO: 35.5 %
MCH RBC QN AUTO: 29.8 PG (ref 26–34)
MCHC RBC AUTO-ENTMCNC: 32.9 G/DL (ref 31–37)
MCV RBC AUTO: 90.5 FL (ref 80–100)
MIXED CELL %: 11.1 %
NEUTROPHILS # BLD AUTO: 3.1 X10ˆ3/UL (ref 1.5–7.7)
NEUTROPHILS NFR BLD AUTO: 53.4 %
PLATELET # BLD AUTO: 247 X10ˆ3/UL (ref 150–450)
RBC # BLD AUTO: 5.04 X10ˆ6/UL (ref 3.8–5.3)
WBC # BLD AUTO: 5.7 X10ˆ3/UL (ref 4–11)

## 2019-03-14 PROCEDURE — 99204 OFFICE O/P NEW MOD 45 MIN: CPT

## 2019-03-14 PROCEDURE — 93005 ELECTROCARDIOGRAM TRACING: CPT

## 2019-03-14 PROCEDURE — 99215 OFFICE O/P EST HI 40 MIN: CPT

## 2019-03-14 PROCEDURE — 85025 COMPLETE CBC W/AUTO DIFF WBC: CPT | Performed by: FAMILY MEDICINE

## 2019-03-14 PROCEDURE — 96360 HYDRATION IV INFUSION INIT: CPT

## 2019-03-14 PROCEDURE — 96372 THER/PROPH/DIAG INJ SC/IM: CPT

## 2019-03-14 PROCEDURE — 99284 EMERGENCY DEPT VISIT MOD MDM: CPT

## 2019-03-14 PROCEDURE — 93010 ELECTROCARDIOGRAM REPORT: CPT | Performed by: FAMILY MEDICINE

## 2019-03-14 PROCEDURE — 80047 BASIC METABLC PNL IONIZED CA: CPT

## 2019-03-14 PROCEDURE — 70220 X-RAY EXAM OF SINUSES: CPT | Performed by: FAMILY MEDICINE

## 2019-03-14 PROCEDURE — 93010 ELECTROCARDIOGRAM REPORT: CPT

## 2019-03-14 PROCEDURE — 70450 CT HEAD/BRAIN W/O DYE: CPT | Performed by: EMERGENCY MEDICINE

## 2019-03-14 RX ORDER — IBUPROFEN 400 MG/1
800 TABLET ORAL ONCE
Status: COMPLETED | OUTPATIENT
Start: 2019-03-14 | End: 2019-03-14

## 2019-03-14 RX ORDER — CYCLOBENZAPRINE HCL 10 MG
10 TABLET ORAL 3 TIMES DAILY PRN
Qty: 20 TABLET | Refills: 0 | Status: SHIPPED | OUTPATIENT
Start: 2019-03-14 | End: 2019-03-21

## 2019-03-14 RX ORDER — SODIUM CHLORIDE 9 MG/ML
1000 INJECTION, SOLUTION INTRAVENOUS ONCE
Status: COMPLETED | OUTPATIENT
Start: 2019-03-14 | End: 2019-03-14

## 2019-03-14 RX ORDER — IBUPROFEN 600 MG/1
600 TABLET ORAL ONCE
Status: DISCONTINUED | OUTPATIENT
Start: 2019-03-14 | End: 2019-03-15

## 2019-03-14 RX ORDER — BUTALBITAL, ACETAMINOPHEN AND CAFFEINE 50; 325; 40 MG/1; MG/1; MG/1
1-2 TABLET ORAL EVERY 4 HOURS PRN
Qty: 20 TABLET | Refills: 0 | Status: SHIPPED | OUTPATIENT
Start: 2019-03-14 | End: 2020-08-05

## 2019-03-14 RX ORDER — DIPHENHYDRAMINE HCL 25 MG
25 CAPSULE ORAL ONCE
Status: COMPLETED | OUTPATIENT
Start: 2019-03-14 | End: 2019-03-14

## 2019-03-14 RX ORDER — ORPHENADRINE CITRATE 30 MG/ML
60 INJECTION INTRAMUSCULAR; INTRAVENOUS ONCE
Status: COMPLETED | OUTPATIENT
Start: 2019-03-14 | End: 2019-03-14

## 2019-03-14 NOTE — ED INITIAL ASSESSMENT (HPI)
Per pt reports headache for one week that has been constant reports a lot pressure around head. Reports right ear pressure and pounding states gets dizzy with looking down.  Pt denies any nausea or vomiting denies any relief with over the counter pain medic

## 2019-03-14 NOTE — TELEPHONE ENCOUNTER
Action Requested: Summary for Provider     []  Critical Lab, Recommendations Needed  [] Need Additional Advice  []   FYI    []   Need Orders  [] Need Medications Sent to Pharmacy  []  Other     SUMMARY:      Patient called stating she has been Julieth Christina

## 2019-03-15 NOTE — ED INITIAL ASSESSMENT (HPI)
Headache intermittently x 1 week. Mild intermittent dizziness, not present at this time. No n/v.  FAST exam normal.

## 2019-03-15 NOTE — ED PROVIDER NOTES
Patient Seen in: [facility] Emergency Department    History   Patient presents with:  Headache (neurologic)      HPI    Patient presents to the ED complaining of an intermittent headache for the past 1 week.   She states headache feels like a band daily      ROS  Pertinent Positives: Neck pain, headache  All other organ systems are reviewed and are negative. Constitutional and vital signs reviewed.       Social History and Family History elements reviewed from today, pertinent positives to the pre on 3/14/2019 at 19:20          ED Medications Administered:   Medications   diphenhydrAMINE (BENADRYL) cap/tab 25 mg (25 mg Oral Given 3/14/19 2238)   Orphenadrine Citrate (NORFLEX) injection 60 mg (60 mg Intramuscular Given 3/14/19 2238)         MDM diagnosis)  Strain of neck muscle, initial encounter    Disposition:  Discharge    Follow-up:  Jose Mckoy MD  [address] [address] [phone]    Schedule an appointment as soon as possible for a visit in 4 days        Medications

## 2019-03-15 NOTE — ED NOTES
Per MD recommendation IVF stopped but denies any improvement in headache and will be going to [address] for further evaluation of symptoms. Pt denies any dizziness no acute distress noted leaving IC stable.

## 2019-04-03 ENCOUNTER — OFFICE VISIT (OUTPATIENT)
Dept: INTERNAL MEDICINE CLINIC | Facility: CLINIC | Age: 59
End: 2019-04-03
Payer: COMMERCIAL

## 2019-04-03 ENCOUNTER — NURSE TRIAGE (OUTPATIENT)
Dept: OTHER | Age: 59
End: 2019-04-03

## 2019-04-03 VITALS
HEIGHT: 63 IN | DIASTOLIC BLOOD PRESSURE: 76 MMHG | SYSTOLIC BLOOD PRESSURE: 117 MMHG | HEART RATE: 80 BPM | BODY MASS INDEX: 29.77 KG/M2 | WEIGHT: 168 LBS | TEMPERATURE: 98 F

## 2019-04-03 DIAGNOSIS — G44.209 TENSION HEADACHE: Primary | ICD-10-CM

## 2019-04-03 DIAGNOSIS — H53.143 PHOTOPHOBIA OF BOTH EYES: ICD-10-CM

## 2019-04-03 DIAGNOSIS — E11.9 TYPE 2 DIABETES MELLITUS WITHOUT COMPLICATION, WITHOUT LONG-TERM CURRENT USE OF INSULIN (HCC): ICD-10-CM

## 2019-04-03 PROCEDURE — 99213 OFFICE O/P EST LOW 20 MIN: CPT | Performed by: INTERNAL MEDICINE

## 2019-04-03 PROCEDURE — 99212 OFFICE O/P EST SF 10 MIN: CPT | Performed by: INTERNAL MEDICINE

## 2019-04-03 RX ORDER — CYCLOBENZAPRINE HCL 10 MG
10 TABLET ORAL NIGHTLY
COMMUNITY
End: 2019-04-03

## 2019-04-03 RX ORDER — CYCLOBENZAPRINE HCL 10 MG
10 TABLET ORAL NIGHTLY
Qty: 20 TABLET | Refills: 0 | Status: SHIPPED | OUTPATIENT
Start: 2019-04-03 | End: 2019-04-23

## 2019-04-03 NOTE — PROGRESS NOTES
Aftab Paris is a 60year old female. Patient presents with:  ER F/U: patient went to [facility] ER on 3/14/19 headache  Eye Problem      HPI:     HPI  Patient is here for ER follow-up on 3/14/2019 for headaches. She presented to intermittent headaches for 1 week Headaches. Disp: 20 tablet Rfl: 0   TraMADol HCl 50 MG Oral Tab Take 50 mg by mouth every 6 (six) hours as needed for Pain.  Disp:  Rfl:    PANTOPRAZOLE SODIUM 40 MG Oral Tab EC TAKE 1 TABLET BY MOUTH DAILY Disp: 30 tablet Rfl: 11      Past Medical History: clear and moist. No oropharyngeal exudate. Eyes: Conjunctivae and EOM are normal. Pupils are equal, round, and reactive to light. Right eye exhibits no discharge. Left eye exhibits no discharge. No scleral icterus. Cardiovascular: Normal rate.    Pulmon

## 2019-04-03 NOTE — TELEPHONE ENCOUNTER
Patient was seen in urgent care and the ER on 3/14/19. Patient still having on and off headaches and last 3 days eyes are sensitive to light. No other symptoms. Patient wanted to see Dr Sadia Beck this afternoon.  Appointment made for today at 2:20pm with Dr Frandy Hanson

## 2019-04-15 ENCOUNTER — OFFICE VISIT (OUTPATIENT)
Dept: GASTROENTEROLOGY | Facility: CLINIC | Age: 59
End: 2019-04-15
Payer: COMMERCIAL

## 2019-04-15 VITALS
WEIGHT: 167 LBS | BODY MASS INDEX: 29.59 KG/M2 | HEART RATE: 73 BPM | DIASTOLIC BLOOD PRESSURE: 76 MMHG | HEIGHT: 63 IN | SYSTOLIC BLOOD PRESSURE: 114 MMHG

## 2019-04-15 DIAGNOSIS — R10.11 RUQ ABDOMINAL PAIN: ICD-10-CM

## 2019-04-15 DIAGNOSIS — K75.81 NONALCOHOLIC STEATOHEPATITIS (NASH): ICD-10-CM

## 2019-04-15 DIAGNOSIS — K76.0 FATTY LIVER DISEASE, NONALCOHOLIC: Primary | ICD-10-CM

## 2019-04-15 PROCEDURE — 99212 OFFICE O/P EST SF 10 MIN: CPT | Performed by: INTERNAL MEDICINE

## 2019-04-15 PROCEDURE — 99213 OFFICE O/P EST LOW 20 MIN: CPT | Performed by: INTERNAL MEDICINE

## 2019-04-15 NOTE — PROGRESS NOTES
HPI:    Patient ID: Eloisa Hardy returns today for follow-up. Overall, she is doing much better. Some dietary triggers have been identified. Seems to be doing much better on a diabetic diet, avoiding greasy fatty meals and red meat.     Has been on coincidentally she had an episode of fairly severe pain 3 weeks ago and comes in at the 6 month tracie for acute follow-up. 3 weeks ago, she describes about 3 days of intense upper abdominal pain. As before, this is primarily RUQ.   This time was so sever is more attentive to what she eats, the symptoms are better. Sweetie Rivas takes the pantoprazole on an as-needed basis and she is doing very well with that. She does not mind heartburn.     Taking the vitamin E as previously recommended.      =============== upper quadrant is more of a pressure, bloating discomfort. Burning and bloating are thus her chief complaints. Weight is stable, but describes decreased appetite at times. Some associated nausea at times.     Symptoms are relieved by taking smaller p However, even while this was going on, she denies any tenderness in the upper abdomen. Ms. Nicholas Hilario does not acknowledge being all that bloated or distended when this occurred. Normal bowel patterns during the episode. No constipation or diarrhea.     Lexie Lee having intermittent RUQ pain for the past 2 weeks.  She also has mid abd \"burning\" after meals.  She states you saw her in the past for this and is taking Pantoprazole.  She would like to be seen soon.  She was advised to go to ER if sxs severe          right side for last 2 wks and she is also having heartburn.  Pt is asking for an appt sooner than first available with Dr. Prema Gibbons Please advise.    ============================  Previous visit April 6, 2015:    Mariama Cook returns for follow-up today on m right upper quadrant and diffuse upper abdominal discomfort and bloating. No obvious aggravating factors other than spicy meals or fatty meals. No associated nausea or vomiting. No associated weight loss. No fevers. No change in bowel patterns.   10 po sequences are limited by motion artifact   but no definite internal enhancement is identified. This likely reflects a hepatic cyst. Hepatic and   portal veins are patent. SPLEEN: No enlargement. ADRENALS: No mass or enlargement.    KIDNEYS: Symmetric lesion, or evidence of acute pancreatitis. OTHER FINDINGS:                    LIVER:            The liver has normal size and contour. There is diffuse fatty infiltration of the liver. There is moderate central intrahepatic biliary ductal dilatation. 0. 7 cm. No filling defects identified. No focal dominant stricture formation is evident. PANCREAS:      There is normal signal intensity. No focal mass or main pancreatic duct dilatation is seen. There is no evidence of pancreatitis.      OTHER FINDINGS Fentanyl 75 mcg and midazolam 4 mg given intravenously before the  procedure. EGD FINDINGS: The esophagus was healthy and normal down to the GE  junction. Normal GE junction on forward and retroflexed views. No hiatal  hernia seen today.  Further in, t Rfl: 0   TraMADol HCl 50 MG Oral Tab Take 50 mg by mouth every 6 (six) hours as needed for Pain.  Disp:  Rfl:    PANTOPRAZOLE SODIUM 40 MG Oral Tab EC TAKE 1 TABLET BY MOUTH DAILY Disp: 30 tablet Rfl: 11     Allergies:No Known Allergies   PHYSICAL EXAM:   P examination apparently normal about 7 years prior. Previous colonoscopy examination apparently negative about 5 years ago. Reassuring MRI/MRCP exam 12/2014 showing significant fatty liver disease only, normal kidneys.     On follow-up April 6, 2015, right labs.    Started vitamin E therapy as of visit of 7/27/2017. Returns for follow-up looking and sounding well 12/4/2017. LFTs October 2017 are perfect.     Returns for follow-up 5/18/2018 describing a three-day flareup of right upper quadrant and even le syndrome. After trial of antibiotics, I explained and discussed to Ms. Cici Campos consideration for ERCP for possibility of Sphincter of Oddi dysfunction.   We have at least one spike in her LFTs on previous labs and mild biliary dilation described on the MRCP o Referrals:  None     #[id number]

## 2019-06-03 ENCOUNTER — APPOINTMENT (OUTPATIENT)
Dept: LAB | Facility: HOSPITAL | Age: 59
End: 2019-06-03
Attending: INTERNAL MEDICINE
Payer: COMMERCIAL

## 2019-06-03 DIAGNOSIS — E11.9 TYPE 2 DIABETES MELLITUS WITHOUT COMPLICATION, WITHOUT LONG-TERM CURRENT USE OF INSULIN (HCC): ICD-10-CM

## 2019-06-03 DIAGNOSIS — R73.03 PREDIABETES: ICD-10-CM

## 2019-06-03 DIAGNOSIS — K75.81 NONALCOHOLIC STEATOHEPATITIS (NASH): ICD-10-CM

## 2019-06-03 PROCEDURE — 80061 LIPID PANEL: CPT

## 2019-06-03 PROCEDURE — 83036 HEMOGLOBIN GLYCOSYLATED A1C: CPT

## 2019-06-03 PROCEDURE — 82043 UR ALBUMIN QUANTITATIVE: CPT

## 2019-06-03 PROCEDURE — 84450 TRANSFERASE (AST) (SGOT): CPT

## 2019-06-03 PROCEDURE — 84460 ALANINE AMINO (ALT) (SGPT): CPT

## 2019-06-03 PROCEDURE — 36415 COLL VENOUS BLD VENIPUNCTURE: CPT

## 2019-06-03 PROCEDURE — 82570 ASSAY OF URINE CREATININE: CPT

## 2019-06-03 PROCEDURE — 3061F NEG MICROALBUMINURIA REV: CPT | Performed by: FAMILY MEDICINE

## 2019-07-02 ENCOUNTER — APPOINTMENT (OUTPATIENT)
Dept: LAB | Facility: HOSPITAL | Age: 59
End: 2019-07-02
Attending: INTERNAL MEDICINE
Payer: COMMERCIAL

## 2019-07-02 ENCOUNTER — NURSE TRIAGE (OUTPATIENT)
Dept: INTERNAL MEDICINE CLINIC | Facility: CLINIC | Age: 59
End: 2019-07-02

## 2019-07-02 ENCOUNTER — OFFICE VISIT (OUTPATIENT)
Dept: INTERNAL MEDICINE CLINIC | Facility: CLINIC | Age: 59
End: 2019-07-02
Payer: COMMERCIAL

## 2019-07-02 VITALS
SYSTOLIC BLOOD PRESSURE: 111 MMHG | WEIGHT: 163.81 LBS | DIASTOLIC BLOOD PRESSURE: 76 MMHG | BODY MASS INDEX: 29.02 KG/M2 | HEART RATE: 69 BPM | TEMPERATURE: 98 F | HEIGHT: 63 IN

## 2019-07-02 DIAGNOSIS — R00.2 PALPITATION: Primary | ICD-10-CM

## 2019-07-02 DIAGNOSIS — K75.81 NONALCOHOLIC STEATOHEPATITIS (NASH): ICD-10-CM

## 2019-07-02 DIAGNOSIS — K76.0 FATTY LIVER DISEASE, NONALCOHOLIC: ICD-10-CM

## 2019-07-02 DIAGNOSIS — R00.2 PALPITATION: ICD-10-CM

## 2019-07-02 LAB
ALBUMIN SERPL-MCNC: 4 G/DL (ref 3.4–5)
ALBUMIN/GLOB SERPL: 1.1 {RATIO} (ref 1–2)
ALP LIVER SERPL-CCNC: 90 U/L (ref 46–118)
ALT SERPL-CCNC: 43 U/L (ref 13–56)
ANION GAP SERPL CALC-SCNC: 7 MMOL/L (ref 0–18)
AST SERPL-CCNC: 25 U/L (ref 15–37)
BILIRUB SERPL-MCNC: 0.3 MG/DL (ref 0.1–2)
BUN BLD-MCNC: 10 MG/DL (ref 7–18)
BUN/CREAT SERPL: 13.2 (ref 10–20)
CALCIUM BLD-MCNC: 10.2 MG/DL (ref 8.5–10.1)
CHLORIDE SERPL-SCNC: 107 MMOL/L (ref 98–112)
CO2 SERPL-SCNC: 27 MMOL/L (ref 21–32)
CREAT BLD-MCNC: 0.76 MG/DL (ref 0.55–1.02)
GLOBULIN PLAS-MCNC: 3.6 G/DL (ref 2.8–4.4)
GLUCOSE BLD-MCNC: 113 MG/DL (ref 70–99)
M PROTEIN MFR SERPL ELPH: 7.6 G/DL (ref 6.4–8.2)
OSMOLALITY SERPL CALC.SUM OF ELEC: 292 MOSM/KG (ref 275–295)
PATIENT FASTING: NO
POTASSIUM SERPL-SCNC: 4.6 MMOL/L (ref 3.5–5.1)
SODIUM SERPL-SCNC: 141 MMOL/L (ref 136–145)
T4 FREE SERPL-MCNC: 1 NG/DL (ref 0.8–1.7)
TSI SER-ACNC: 1.86 MIU/ML (ref 0.36–3.74)

## 2019-07-02 PROCEDURE — 93005 ELECTROCARDIOGRAM TRACING: CPT

## 2019-07-02 PROCEDURE — 84439 ASSAY OF FREE THYROXINE: CPT

## 2019-07-02 PROCEDURE — 36415 COLL VENOUS BLD VENIPUNCTURE: CPT

## 2019-07-02 PROCEDURE — 80053 COMPREHEN METABOLIC PANEL: CPT

## 2019-07-02 PROCEDURE — 93010 ELECTROCARDIOGRAM REPORT: CPT | Performed by: INTERNAL MEDICINE

## 2019-07-02 PROCEDURE — 84443 ASSAY THYROID STIM HORMONE: CPT

## 2019-07-02 PROCEDURE — 83735 ASSAY OF MAGNESIUM: CPT

## 2019-07-02 PROCEDURE — 99214 OFFICE O/P EST MOD 30 MIN: CPT | Performed by: INTERNAL MEDICINE

## 2019-07-02 NOTE — TELEPHONE ENCOUNTER
Action Requested: Summary for Provider     []  Critical Lab, Recommendations Needed  [] Need Additional Advice  []   FYI    []   Need Orders  [] Need Medications Sent to Pharmacy  []  Other     SUMMARY: patient reports two episodes of rapid heart rate in t

## 2019-07-02 NOTE — PROGRESS NOTES
Derek Rose is a 60year old female. Patient presents with:  Rapid Heart Beat      HPI:     HPI  Patient is here with complaints of palpitations. Reports that she has history of palpitations about 5 years ago that she went to ER.   She had an echocardiog cholecystectomy w/ intraoperative cholangiogram   • Upper gi endoscopy,biopsy  2015      Social History:  Social History    Tobacco Use      Smoking status: Never Smoker      Smokeless tobacco: Never Used    Alcohol use: No      Alcohol/week: 0.0 oz    Isiah hydrated.   We will contact her with the results of the echocardiogram.         Relevant Orders    EKG 12-LEAD (Completed)    TSH+FREE T4    CARD ECHO 2D DOPPLER (CPT=93306)    BASIC METABOLIC PANEL (8)    CARDIO - INTERNAL    MAGNESIUM          rtc in 4 w

## 2019-07-02 NOTE — ASSESSMENT & PLAN NOTE
Palpitations happening intermittently, lasting for few minutes. Episodes are more than 48 hours apart, therefore would need an event monitor. I am ordering an EKG, thyroid, echocardiogram, BMP to check for electrolytes and mag.   Cardiology referral given

## 2019-07-03 LAB — HAV IGM SER QL: 2.5 MG/DL (ref 1.6–2.6)

## 2019-07-22 ENCOUNTER — HOSPITAL ENCOUNTER (OUTPATIENT)
Dept: CV DIAGNOSTICS | Facility: HOSPITAL | Age: 59
Discharge: HOME | End: 2019-07-22
Attending: INTERNAL MEDICINE
Payer: COMMERCIAL

## 2019-07-22 DIAGNOSIS — R00.2 PALPITATION: ICD-10-CM

## 2019-07-22 PROCEDURE — 93306 TTE W/DOPPLER COMPLETE: CPT | Performed by: INTERNAL MEDICINE

## 2020-01-26 NOTE — TELEPHONE ENCOUNTER
Called Preeti back and inform Alice Archer [facility] to accept DME order for DM supplies. ROM intact

## 2020-03-09 ENCOUNTER — TELEPHONE (OUTPATIENT)
Dept: GASTROENTEROLOGY | Facility: CLINIC | Age: 60
End: 2020-03-09

## 2020-03-09 ENCOUNTER — OFFICE VISIT (OUTPATIENT)
Dept: GASTROENTEROLOGY | Facility: CLINIC | Age: 60
End: 2020-03-09
Payer: COMMERCIAL

## 2020-03-09 VITALS
HEIGHT: 63 IN | WEIGHT: 165 LBS | SYSTOLIC BLOOD PRESSURE: 116 MMHG | BODY MASS INDEX: 29.23 KG/M2 | HEART RATE: 70 BPM | DIASTOLIC BLOOD PRESSURE: 81 MMHG

## 2020-03-09 DIAGNOSIS — K75.81 NONALCOHOLIC STEATOHEPATITIS (NASH): Primary | ICD-10-CM

## 2020-03-09 DIAGNOSIS — R10.10 UPPER ABDOMINAL PAIN: ICD-10-CM

## 2020-03-09 DIAGNOSIS — Z12.11 SCREENING FOR COLON CANCER: Primary | ICD-10-CM

## 2020-03-09 DIAGNOSIS — R10.11 RUQ ABDOMINAL PAIN: ICD-10-CM

## 2020-03-09 PROCEDURE — 99213 OFFICE O/P EST LOW 20 MIN: CPT | Performed by: INTERNAL MEDICINE

## 2020-03-09 RX ORDER — POLYETHYLENE GLYCOL 3350, SODIUM CHLORIDE, POTASSIUM CHLORIDE, SODIUM BICARBONATE, AND SODIUM SULFATE 240; 5.84; 2.98; 6.72; 22.72 G/4L; G/4L; G/4L; G/4L; G/4L
POWDER, FOR SOLUTION ORAL
Qty: 1 BOTTLE | Refills: 0 | Status: SHIPPED | OUTPATIENT
Start: 2020-03-09 | End: 2020-08-05

## 2020-03-09 NOTE — PROGRESS NOTES
HPI:    Patient ID: Austin Khan returns today for follow-up. No new complaints since our last visit. PCP Dr. Willie Olivo has moved on from our group and she is seeking a new primary care doctor out of the [facility] office.     Senia Greene comes in today looking a 1/23/2019:    Edgar Marie returns today for follow-up. She is suffering another episode of the pain discussed during her previous visit in May.   It sounds like she actually did very well over the 6 months after the previous episode and office visit o identified triggers. Patient and her  are beginning to suspect that there may be some food triggers involved. Also coping with the stress of her mother suffering dementia. Sounds like that has been very upsetting recently.     Continues on the V back.    There may be 2 components to this pain. She describes a burning epigastric abdominal pain. The pain around the base of her right rib cage and right upper quadrant is more of a pressure, bloating discomfort.     Burning and bloating are thus her c quadrant \"swollen\" pain. This was a distended sensation which also was burning in nature. She felt this distended, painful sensation radiating to the back. However, even while this was going on, she denies any tenderness in the upper abdomen.     Ms. Ishmael العراقي severe.           Lisa Zafar RN at 4/21/2016  3:45 PM     Status: Signed : Lisa Zafar RN (Registered Nurse)    Dr Valdez:  Pt stats that she has been having intermittent RUQ pain for the past 2 weeks.  She also has mid abd \"burning she is advised to arrive at 1115.           Rosina Lisa at 12/14/2015  8:58 AM     Status: Signed : Sumaya Marie states she is having abdominal pain on right side for last 2 wks and she is also having heartburn.  Pt is asking for an cholelithiasis. Negative intraoperative cholangiogram.  She made an uneventful recovery. Ms. Rita Garcia comes in for evaluation of about 1 month of burning, pinching epigastric right upper quadrant and diffuse upper abdominal discomfort and bloating.   No obv on intraoperative cholangiogram performed   at the time of cholecystectomy and appears separate   from the biliary ductal system on multiple images.  The   post contrast sequences are limited by motion artifact   but no definite internal enhancement is iden [this is a typo - d/w Dr Hassan [facility] 7/28/17 - was meant to read NO panc ductal dilation.]  No divisum. Normal intrinsic T1 signal of the pancreas, without atrophy, enhancing lesion, or evidence of acute pancreatitis.      OTHER FINDINGS:                    LIVER quadrant pain. MRCP FINDINGS:    GALLBLADDER:            Surgically absent. BILE DUCTS:    Mild extrahepatic ductal dilation with the common bile duct measuring 0.7 cm. No filling defects identified. No focal dominant stricture formation is evident. dyspepsia. POSTOPERATIVE DIAGNOSIS: Nonspecific gastritis. SURGEON: Desmond Schmitz MD    SEDATION:  1. Benzocaine topical spray to the oropharynx before the procedure.   2. Fentanyl 75 mcg and midazolam 4 mg given intravenously before the  proce (four) hours as needed for Headaches. 20 tablet 0   • TraMADol HCl 50 MG Oral Tab Take 50 mg by mouth every 6 (six) hours as needed for Pain.      • PANTOPRAZOLE SODIUM 40 MG Oral Tab EC TAKE 1 TABLET BY MOUTH DAILY (Patient not taking: Reported on 3/9/2020 pain, diffuse upper abdominal bloating discomfort. Previous EGD examination apparently normal about 7 years prior. Previous colonoscopy examination apparently negative about 5 years ago.   Reassuring MRI/MRCP exam 12/2014 showing significant fatty liver d better. Mild elevation in hepatic transaminases on recent labs. Started vitamin E therapy as of visit of 7/27/2017. Returns for follow-up looking and sounding well 12/4/2017. LFTs October 2017 are perfect.     Returns for follow-up 5/18/2018 describ April 2017; we discussed this at length during office visit of 1/23/2019. Shae London would like to repeat due to the severe ongoing symptoms past 2 weeks. Prescription sent in for 7-day course of ciprofloxacin and metronidazole antibiotics 1/23/2019.   · Worse risks; bleeding, colonic injury or perforation, infection. We discussed the rare occurrence of missed lesions including missed polyps or missed malignancy with colonoscopy examination. The patient understood these risks and agrees to proceed.   The need f

## 2020-03-09 NOTE — TELEPHONE ENCOUNTER
Scheduled for:  Colonoscopy 55975  Provider Name:  Dr Yan Alves  Date:  05/19/2020  Location:  [facility]  Sedation:  MAC  Time:  8:15AM (pt is aware to arrive at 7:15AM)  Prep:  Golyte  Meds/Allergies Reconciled?:Physician reviewed    Diagnosis with codes:  Colon C

## 2020-05-08 ENCOUNTER — TELEPHONE (OUTPATIENT)
Dept: GASTROENTEROLOGY | Facility: CLINIC | Age: 60
End: 2020-05-08

## 2020-05-08 DIAGNOSIS — Z12.11 COLON CANCER SCREENING: Primary | ICD-10-CM

## 2020-05-08 NOTE — TELEPHONE ENCOUNTER
Rescheduled for:  Colonoscopy 31881  Provider Name:  Dr. Jonnathan Edmondson  Date:    From-5/19/20  To-8/18/20    Location:    [address] [place] [address]  Sedation:  MAC  Time:    From-0815  To-0930 (pt is aware to arrive at 0830)   Prep:  Temo Barth will mail instructions on 5/11/20

## 2020-08-05 ENCOUNTER — OFFICE VISIT (OUTPATIENT)
Dept: FAMILY MEDICINE CLINIC | Facility: CLINIC | Age: 60
End: 2020-08-05
Payer: COMMERCIAL

## 2020-08-05 VITALS
DIASTOLIC BLOOD PRESSURE: 72 MMHG | HEIGHT: 63 IN | SYSTOLIC BLOOD PRESSURE: 118 MMHG | WEIGHT: 164.38 LBS | HEART RATE: 72 BPM | BODY MASS INDEX: 29.12 KG/M2 | TEMPERATURE: 98 F

## 2020-08-05 DIAGNOSIS — Z00.00 ROUTINE HEALTH MAINTENANCE: ICD-10-CM

## 2020-08-05 DIAGNOSIS — H04.123 DRY EYES: ICD-10-CM

## 2020-08-05 DIAGNOSIS — Z12.31 ENCOUNTER FOR SCREENING MAMMOGRAM FOR MALIGNANT NEOPLASM OF BREAST: ICD-10-CM

## 2020-08-05 DIAGNOSIS — K75.81 NONALCOHOLIC STEATOHEPATITIS (NASH): ICD-10-CM

## 2020-08-05 DIAGNOSIS — R73.03 PREDIABETES: Primary | ICD-10-CM

## 2020-08-05 PROBLEM — E11.9 TYPE 2 DIABETES MELLITUS WITHOUT COMPLICATION, WITHOUT LONG-TERM CURRENT USE OF INSULIN (HCC): Status: RESOLVED | Noted: 2019-02-04 | Resolved: 2020-08-05

## 2020-08-05 LAB
ALBUMIN SERPL-MCNC: 4 G/DL (ref 3.4–5)
ALBUMIN/GLOB SERPL: 1.1 {RATIO} (ref 1–2)
ALP LIVER SERPL-CCNC: 86 U/L (ref 50–130)
ALT SERPL-CCNC: 31 U/L (ref 13–56)
ANION GAP SERPL CALC-SCNC: 4 MMOL/L (ref 0–18)
AST SERPL-CCNC: 23 U/L (ref 15–37)
BILIRUB SERPL-MCNC: 0.5 MG/DL (ref 0.1–2)
BUN BLD-MCNC: 11 MG/DL (ref 7–18)
BUN/CREAT SERPL: 12.5 (ref 10–20)
CALCIUM BLD-MCNC: 9.7 MG/DL (ref 8.5–10.1)
CARTRIDGE LOT#: ABNORMAL NUMERIC
CHLORIDE SERPL-SCNC: 109 MMOL/L (ref 98–112)
CHOLEST SMN-MCNC: 169 MG/DL (ref ?–200)
CO2 SERPL-SCNC: 28 MMOL/L (ref 21–32)
CREAT BLD-MCNC: 0.88 MG/DL (ref 0.55–1.02)
GLOBULIN PLAS-MCNC: 3.7 G/DL (ref 2.8–4.4)
GLUCOSE BLD-MCNC: 113 MG/DL (ref 70–99)
GLUCOSE BLOOD: 118
HDLC SERPL-MCNC: 54 MG/DL (ref 40–59)
HEMOGLOBIN A1C: 5.7 % (ref 4.3–5.6)
LDLC SERPL CALC-MCNC: 92 MG/DL (ref ?–100)
M PROTEIN MFR SERPL ELPH: 7.7 G/DL (ref 6.4–8.2)
NONHDLC SERPL-MCNC: 115 MG/DL (ref ?–130)
OSMOLALITY SERPL CALC.SUM OF ELEC: 292 MOSM/KG (ref 275–295)
PATIENT FASTING Y/N/NP: YES
PATIENT FASTING Y/N/NP: YES
POTASSIUM SERPL-SCNC: 4.4 MMOL/L (ref 3.5–5.1)
SODIUM SERPL-SCNC: 141 MMOL/L (ref 136–145)
TEST STRIP LOT #: NORMAL NUMERIC
TRIGL SERPL-MCNC: 115 MG/DL (ref 30–149)
VLDLC SERPL CALC-MCNC: 23 MG/DL (ref 0–30)

## 2020-08-05 PROCEDURE — 83036 HEMOGLOBIN GLYCOSYLATED A1C: CPT | Performed by: FAMILY MEDICINE

## 2020-08-05 PROCEDURE — 90471 IMMUNIZATION ADMIN: CPT | Performed by: FAMILY MEDICINE

## 2020-08-05 PROCEDURE — 90750 HZV VACC RECOMBINANT IM: CPT | Performed by: FAMILY MEDICINE

## 2020-08-05 PROCEDURE — 3074F SYST BP LT 130 MM HG: CPT | Performed by: FAMILY MEDICINE

## 2020-08-05 PROCEDURE — 36416 COLLJ CAPILLARY BLOOD SPEC: CPT | Performed by: FAMILY MEDICINE

## 2020-08-05 PROCEDURE — 3078F DIAST BP <80 MM HG: CPT | Performed by: FAMILY MEDICINE

## 2020-08-05 PROCEDURE — 3008F BODY MASS INDEX DOCD: CPT | Performed by: FAMILY MEDICINE

## 2020-08-05 PROCEDURE — 99214 OFFICE O/P EST MOD 30 MIN: CPT | Performed by: FAMILY MEDICINE

## 2020-08-05 PROCEDURE — 82962 GLUCOSE BLOOD TEST: CPT | Performed by: FAMILY MEDICINE

## 2020-08-05 NOTE — PROGRESS NOTES
Grey Lacy is a 63year old female. Patient presents with:  Routine Physical  Diabetes      HPI:   Patient presents as a 60-year-old female for check on her diagnosis of prediabetes and nonalcoholic elevated liver enzymes.   Patient's hemoglobin A1c toda 118/72   Pulse 72   Temp 97.5 °F (36.4 °C) (Temporal)   Ht 5' 3\" (1.6 m)   Wt 164 lb 6.4 oz (74.6 kg)   BMI 29.12 kg/m²   GENERAL: well developed, well nourished,in no apparent distress  SKIN: no rashes,no suspicious lesions  HEENT: atraumatic, normocepha

## 2020-08-14 ENCOUNTER — TELEPHONE (OUTPATIENT)
Dept: GASTROENTEROLOGY | Facility: CLINIC | Age: 60
End: 2020-08-14

## 2020-08-14 NOTE — TELEPHONE ENCOUNTER
Pt's arrival time is 8:30 am @ [facility] on 08/18/2020 9:30 procedure time,  if she calls back.     Left message on voice mail to call back

## 2020-08-15 ENCOUNTER — LAB ENCOUNTER (OUTPATIENT)
Dept: LAB | Facility: HOSPITAL | Age: 60
End: 2020-08-15
Attending: INTERNAL MEDICINE
Payer: COMMERCIAL

## 2020-08-15 DIAGNOSIS — Z01.818 PRE-OP TESTING: ICD-10-CM

## 2020-08-17 LAB — SARS-COV-2 RNA RESP QL NAA+PROBE: NOT DETECTED

## 2020-08-18 ENCOUNTER — ANESTHESIA EVENT (OUTPATIENT)
Dept: ENDOSCOPY | Facility: HOSPITAL | Age: 60
End: 2020-08-18
Payer: COMMERCIAL

## 2020-08-18 ENCOUNTER — HOSPITAL ENCOUNTER (OUTPATIENT)
Facility: HOSPITAL | Age: 60
Setting detail: HOSPITAL OUTPATIENT SURGERY
Discharge: HOME | End: 2020-08-18
Attending: INTERNAL MEDICINE | Admitting: INTERNAL MEDICINE
Payer: COMMERCIAL

## 2020-08-18 ENCOUNTER — ANESTHESIA (OUTPATIENT)
Dept: ENDOSCOPY | Facility: HOSPITAL | Age: 60
End: 2020-08-18
Payer: COMMERCIAL

## 2020-08-18 DIAGNOSIS — K57.90 DIVERTICULOSIS: ICD-10-CM

## 2020-08-18 DIAGNOSIS — K63.5 POLYP OF ASCENDING COLON, UNSPECIFIED TYPE: ICD-10-CM

## 2020-08-18 DIAGNOSIS — Z12.11 SCREENING FOR COLON CANCER: ICD-10-CM

## 2020-08-18 DIAGNOSIS — Z01.818 PRE-OP TESTING: Primary | ICD-10-CM

## 2020-08-18 LAB — GLUCOSE BLDC GLUCOMTR-MCNC: 111 MG/DL (ref 70–99)

## 2020-08-18 PROCEDURE — 0DBK8ZX EXCISION OF ASCENDING COLON, VIA NATURAL OR ARTIFICIAL OPENING ENDOSCOPIC, DIAGNOSTIC: ICD-10-PCS | Performed by: INTERNAL MEDICINE

## 2020-08-18 PROCEDURE — 45385 COLONOSCOPY W/LESION REMOVAL: CPT | Performed by: INTERNAL MEDICINE

## 2020-08-18 RX ORDER — DEXTROSE MONOHYDRATE 25 G/50ML
50 INJECTION, SOLUTION INTRAVENOUS
Status: DISCONTINUED | OUTPATIENT
Start: 2020-08-18 | End: 2020-08-18

## 2020-08-18 RX ORDER — NALOXONE HYDROCHLORIDE 0.4 MG/ML
80 INJECTION, SOLUTION INTRAMUSCULAR; INTRAVENOUS; SUBCUTANEOUS AS NEEDED
Status: DISCONTINUED | OUTPATIENT
Start: 2020-08-18 | End: 2020-08-18

## 2020-08-18 RX ORDER — SODIUM CHLORIDE, SODIUM LACTATE, POTASSIUM CHLORIDE, CALCIUM CHLORIDE 600; 310; 30; 20 MG/100ML; MG/100ML; MG/100ML; MG/100ML
INJECTION, SOLUTION INTRAVENOUS CONTINUOUS
Status: DISCONTINUED | OUTPATIENT
Start: 2020-08-18 | End: 2020-08-18

## 2020-08-18 RX ORDER — LIDOCAINE HYDROCHLORIDE 10 MG/ML
INJECTION, SOLUTION EPIDURAL; INFILTRATION; INTRACAUDAL; PERINEURAL AS NEEDED
Status: DISCONTINUED | OUTPATIENT
Start: 2020-08-18 | End: 2020-08-18 | Stop reason: SURG

## 2020-08-18 RX ADMIN — SODIUM CHLORIDE, SODIUM LACTATE, POTASSIUM CHLORIDE, CALCIUM CHLORIDE: 600; 310; 30; 20 INJECTION, SOLUTION INTRAVENOUS at 10:48:00

## 2020-08-18 RX ADMIN — LIDOCAINE HYDROCHLORIDE 40 MG: 10 INJECTION, SOLUTION EPIDURAL; INFILTRATION; INTRACAUDAL; PERINEURAL at 10:13:00

## 2020-08-18 NOTE — H&P
History & Physical Examination    Patient Name: Mala Mcmahon  MRN: [id number]  [facility]: [id number]  YOB: 1960    Diagnosis: 68-year-old woman with history of hypertension diabetes cholecystectomy who presents for a 10-year follow-up screening col above.    Gian Zuñigaida Primus  8/18/2020  10:04 AM

## 2020-08-18 NOTE — ANESTHESIA PREPROCEDURE EVALUATION
Anesthesia PreOp Note    HPI:     Nikkie Gutierrez is a 63year old female who presents for preoperative consultation requested by: Prabha Perla MD    Date of Surgery: 8/18/2020    Procedure(s):  COLONOSCOPY  Indication: Screening for colon cancer History      Marital status:       Spouse name: Not on file      Number of children: Not on file      Years of education: Not on file      Highest education level: Not on file    Occupational History      Not on file    Social Needs      Financial r Results   Component Value Date     08/05/2020    K 4.4 08/05/2020     08/05/2020    CO2 28.0 08/05/2020    BUN 11 08/05/2020    CREATSERUM 0.88 08/05/2020     (H) 08/05/2020    CA 9.7 08/05/2020          Vital Signs:   Body mass index is

## 2020-08-18 NOTE — ANESTHESIA POSTPROCEDURE EVALUATION
Patient: Mark Gan    Procedure Summary     Date:  08/18/20 Room / Location:  [address] [place] [address] ENDOSCOPY 05 / [address] [place] [address] ENDOSCOPY    Anesthesia Start:  6994 Anesthesia Stop:      Procedure:  COLONOSCOPY (N/A ) Diagnosis:       Screening for colon cancer      (polyps, dive

## 2020-08-19 ENCOUNTER — HOSPITAL ENCOUNTER (OUTPATIENT)
Dept: MAMMOGRAPHY | Age: 60
Discharge: HOME | End: 2020-08-19
Attending: FAMILY MEDICINE
Payer: COMMERCIAL

## 2020-08-19 VITALS
OXYGEN SATURATION: 98 % | HEART RATE: 61 BPM | RESPIRATION RATE: 15 BRPM | BODY MASS INDEX: 29.06 KG/M2 | HEIGHT: 63 IN | TEMPERATURE: 98 F | SYSTOLIC BLOOD PRESSURE: 111 MMHG | DIASTOLIC BLOOD PRESSURE: 69 MMHG | WEIGHT: 164 LBS

## 2020-08-19 DIAGNOSIS — Z12.31 ENCOUNTER FOR SCREENING MAMMOGRAM FOR MALIGNANT NEOPLASM OF BREAST: ICD-10-CM

## 2020-08-19 PROCEDURE — 77067 SCR MAMMO BI INCL CAD: CPT | Performed by: FAMILY MEDICINE

## 2020-08-19 PROCEDURE — 77063 BREAST TOMOSYNTHESIS BI: CPT | Performed by: FAMILY MEDICINE

## 2020-08-28 ENCOUNTER — TELEPHONE (OUTPATIENT)
Dept: GASTROENTEROLOGY | Facility: CLINIC | Age: 60
End: 2020-08-28

## 2020-08-28 NOTE — TELEPHONE ENCOUNTER
Results letter mailed out to patient per   Recall Colon in 5 yrs ,8/18/2025   Colon done in 8/18/2020    Updated  Jayjay Lynn MD  [facility] Clinical Staff             GI RNs - 1.  Please print and mail this letter to patient; 2. Recal

## 2021-01-27 ENCOUNTER — TELEPHONE (OUTPATIENT)
Dept: FAMILY MEDICINE CLINIC | Facility: CLINIC | Age: 61
End: 2021-01-27

## 2021-01-27 ENCOUNTER — OFFICE VISIT (OUTPATIENT)
Dept: FAMILY MEDICINE CLINIC | Facility: CLINIC | Age: 61
End: 2021-01-27
Payer: MEDICAID

## 2021-01-27 VITALS
HEIGHT: 63 IN | SYSTOLIC BLOOD PRESSURE: 131 MMHG | WEIGHT: 163.63 LBS | HEART RATE: 81 BPM | DIASTOLIC BLOOD PRESSURE: 83 MMHG | BODY MASS INDEX: 28.99 KG/M2 | TEMPERATURE: 97 F

## 2021-01-27 DIAGNOSIS — R73.03 PREDIABETES: ICD-10-CM

## 2021-01-27 DIAGNOSIS — N64.52 BLOODY DISCHARGE FROM LEFT NIPPLE: Primary | ICD-10-CM

## 2021-01-27 LAB
CARTRIDGE LOT#: 755 NUMERIC
GLUCOSE BLOOD: 110
HEMOGLOBIN A1C: 6.2 % (ref 4.3–5.6)
TEST STRIP LOT #: NORMAL NUMERIC

## 2021-01-27 PROCEDURE — 3008F BODY MASS INDEX DOCD: CPT | Performed by: FAMILY MEDICINE

## 2021-01-27 PROCEDURE — 3079F DIAST BP 80-89 MM HG: CPT | Performed by: FAMILY MEDICINE

## 2021-01-27 PROCEDURE — 99213 OFFICE O/P EST LOW 20 MIN: CPT | Performed by: FAMILY MEDICINE

## 2021-01-27 PROCEDURE — 82962 GLUCOSE BLOOD TEST: CPT | Performed by: FAMILY MEDICINE

## 2021-01-27 PROCEDURE — 36416 COLLJ CAPILLARY BLOOD SPEC: CPT | Performed by: FAMILY MEDICINE

## 2021-01-27 PROCEDURE — 83036 HEMOGLOBIN GLYCOSYLATED A1C: CPT | Performed by: FAMILY MEDICINE

## 2021-01-27 PROCEDURE — 3075F SYST BP GE 130 - 139MM HG: CPT | Performed by: FAMILY MEDICINE

## 2021-01-27 NOTE — PROGRESS NOTES
Daine Epley is a 63year old female.   Patient presents with:  Breast Pain: patient noticed bloody discharge from left nipple about a day ago, patient denies pain and tenderness in breast      HPI:   Patient is a 69-year-old female who presents today compl urination, change in the color of urine, discharge, urinating frequently  MUS: No back pain, joint pain, muscle pain  NEURO: denies headaches , anxiety, depression    EXAM:   /83   Pulse 81   Temp 96.8 °F (36 °C) (Temporal)   Ht 5' 3\" (1.6 m)   Wt 1 follow-ups on file.

## 2021-01-27 NOTE — TELEPHONE ENCOUNTER
Sinan Griffiths from [facility] is calling to inform Dr. Juan Daniel Garza that they no longer do the terence ductogram. She also said that they can do the diagnostic of left breast but they do not do complete. Would like new orders written.

## 2021-02-01 ENCOUNTER — HOSPITAL ENCOUNTER (OUTPATIENT)
Dept: ULTRASOUND IMAGING | Facility: HOSPITAL | Age: 61
Discharge: HOME | End: 2021-02-01
Attending: FAMILY MEDICINE
Payer: MEDICAID

## 2021-02-01 ENCOUNTER — HOSPITAL ENCOUNTER (OUTPATIENT)
Dept: MAMMOGRAPHY | Facility: HOSPITAL | Age: 61
Discharge: HOME | End: 2021-02-01
Attending: FAMILY MEDICINE
Payer: MEDICAID

## 2021-02-01 DIAGNOSIS — N64.52 BLOODY DISCHARGE FROM LEFT NIPPLE: ICD-10-CM

## 2021-02-01 PROCEDURE — 76642 ULTRASOUND BREAST LIMITED: CPT | Performed by: FAMILY MEDICINE

## 2021-02-01 PROCEDURE — 77065 DX MAMMO INCL CAD UNI: CPT | Performed by: FAMILY MEDICINE

## 2021-02-01 PROCEDURE — 77061 BREAST TOMOSYNTHESIS UNI: CPT | Performed by: FAMILY MEDICINE

## 2021-02-03 ENCOUNTER — OFFICE VISIT (OUTPATIENT)
Dept: SURGERY | Facility: CLINIC | Age: 61
End: 2021-02-03
Payer: MEDICAID

## 2021-02-03 ENCOUNTER — LAB ENCOUNTER (OUTPATIENT)
Dept: LAB | Age: 61
End: 2021-02-03
Attending: SURGERY
Payer: MEDICAID

## 2021-02-03 DIAGNOSIS — N63.0 BREAST MASS: ICD-10-CM

## 2021-02-03 DIAGNOSIS — N64.52 BLOODY DISCHARGE FROM LEFT NIPPLE: ICD-10-CM

## 2021-02-03 DIAGNOSIS — N63.0 BREAST MASS: Primary | ICD-10-CM

## 2021-02-03 LAB
ALBUMIN SERPL-MCNC: 3.9 G/DL (ref 3.4–5)
ALBUMIN/GLOB SERPL: 1.1 {RATIO} (ref 1–2)
ALP LIVER SERPL-CCNC: 90 U/L
ALT SERPL-CCNC: 31 U/L
ANION GAP SERPL CALC-SCNC: 3 MMOL/L (ref 0–18)
AST SERPL-CCNC: 21 U/L (ref 15–37)
BASOPHILS # BLD AUTO: 0.05 X10(3) UL (ref 0–0.2)
BASOPHILS NFR BLD AUTO: 0.9 %
BILIRUB SERPL-MCNC: 0.3 MG/DL (ref 0.1–2)
BUN BLD-MCNC: 10 MG/DL (ref 7–18)
BUN/CREAT SERPL: 12.2 (ref 10–20)
CALCIUM BLD-MCNC: 10.1 MG/DL (ref 8.5–10.1)
CHLORIDE SERPL-SCNC: 107 MMOL/L (ref 98–112)
CO2 SERPL-SCNC: 29 MMOL/L (ref 21–32)
CREAT BLD-MCNC: 0.82 MG/DL
DEPRECATED RDW RBC AUTO: 42.3 FL (ref 35.1–46.3)
EOSINOPHIL # BLD AUTO: 0.11 X10(3) UL (ref 0–0.7)
EOSINOPHIL NFR BLD AUTO: 2.1 %
ERYTHROCYTE [DISTWIDTH] IN BLOOD BY AUTOMATED COUNT: 12.8 % (ref 11–15)
GLOBULIN PLAS-MCNC: 3.6 G/DL (ref 2.8–4.4)
GLUCOSE BLD-MCNC: 98 MG/DL (ref 70–99)
HCT VFR BLD AUTO: 42.5 %
HGB BLD-MCNC: 14.1 G/DL
IMM GRANULOCYTES # BLD AUTO: 0.01 X10(3) UL (ref 0–1)
IMM GRANULOCYTES NFR BLD: 0.2 %
LYMPHOCYTES # BLD AUTO: 1.63 X10(3) UL (ref 1–4)
LYMPHOCYTES NFR BLD AUTO: 30.6 %
M PROTEIN MFR SERPL ELPH: 7.5 G/DL (ref 6.4–8.2)
MCH RBC QN AUTO: 29.7 PG (ref 26–34)
MCHC RBC AUTO-ENTMCNC: 33.2 G/DL (ref 31–37)
MCV RBC AUTO: 89.7 FL
MONOCYTES # BLD AUTO: 0.56 X10(3) UL (ref 0.1–1)
MONOCYTES NFR BLD AUTO: 10.5 %
NEUTROPHILS # BLD AUTO: 2.96 X10 (3) UL (ref 1.5–7.7)
NEUTROPHILS # BLD AUTO: 2.96 X10(3) UL (ref 1.5–7.7)
NEUTROPHILS NFR BLD AUTO: 55.7 %
OSMOLALITY SERPL CALC.SUM OF ELEC: 287 MOSM/KG (ref 275–295)
PATIENT FASTING Y/N/NP: NO
PLATELET # BLD AUTO: 283 10(3)UL (ref 150–450)
POTASSIUM SERPL-SCNC: 4 MMOL/L (ref 3.5–5.1)
RBC # BLD AUTO: 4.74 X10(6)UL
SODIUM SERPL-SCNC: 139 MMOL/L (ref 136–145)
WBC # BLD AUTO: 5.3 X10(3) UL (ref 4–11)

## 2021-02-03 PROCEDURE — 36415 COLL VENOUS BLD VENIPUNCTURE: CPT

## 2021-02-03 PROCEDURE — 99203 OFFICE O/P NEW LOW 30 MIN: CPT | Performed by: SURGERY

## 2021-02-03 PROCEDURE — 85025 COMPLETE CBC W/AUTO DIFF WBC: CPT

## 2021-02-03 PROCEDURE — 80053 COMPREHEN METABOLIC PANEL: CPT

## 2021-02-03 NOTE — H&P
History and Physical      HPI   Patient presents with:  Referral: Referral from Dr. Lorena Hui for Left Breast Nipple bleeding. Patient reports onset is about 3 to 4 wks. Patient denies pain.         HPI  Alley Gordillo is a 57year old female who presents wi Sexual Activity      Alcohol use: No        Alcohol/week: 0.0 standard drinks      Drug use: No      Sexual activity: Yes    Family History   Problem Relation Age of Onset   • Diabetes Father    • Hypertension Mother    • Arthritis Mother         rheumatoi the above

## 2021-02-03 NOTE — PATIENT INSTRUCTIONS
Obtain preoperative testing.     Plan left breast micro duct ectomy at [facility] AND [facility]

## 2021-02-03 NOTE — H&P (VIEW-ONLY)
History and Physical      HPI   Patient presents with:  Referral: Referral from Dr. Anastacio Fry for Left Breast Nipple bleeding. Patient reports onset is about 3 to 4 wks. Patient denies pain.         HPI  Lux Hernandez is a 57year old female who presents wi Sexual Activity      Alcohol use: No        Alcohol/week: 0.0 standard drinks      Drug use: No      Sexual activity: Yes    Family History   Problem Relation Age of Onset   • Diabetes Father    • Hypertension Mother    • Arthritis Mother         rheumatoi the above

## 2021-02-11 ENCOUNTER — EKG ENCOUNTER (OUTPATIENT)
Dept: LAB | Age: 61
End: 2021-02-11
Attending: SURGERY
Payer: MEDICAID

## 2021-02-11 DIAGNOSIS — N63.0 BREAST MASS: ICD-10-CM

## 2021-02-11 PROCEDURE — 93005 ELECTROCARDIOGRAM TRACING: CPT

## 2021-02-11 PROCEDURE — 93010 ELECTROCARDIOGRAM REPORT: CPT | Performed by: SURGERY

## 2021-02-18 ENCOUNTER — ANESTHESIA (OUTPATIENT)
Dept: SURGERY | Facility: HOSPITAL | Age: 61
End: 2021-02-18
Payer: MEDICAID

## 2021-02-18 ENCOUNTER — ANESTHESIA EVENT (OUTPATIENT)
Dept: SURGERY | Facility: HOSPITAL | Age: 61
End: 2021-02-18
Payer: MEDICAID

## 2021-02-18 ENCOUNTER — HOSPITAL ENCOUNTER (OUTPATIENT)
Facility: HOSPITAL | Age: 61
Setting detail: HOSPITAL OUTPATIENT SURGERY
Discharge: HOME | End: 2021-02-18
Attending: SURGERY | Admitting: SURGERY
Payer: MEDICAID

## 2021-02-18 VITALS
SYSTOLIC BLOOD PRESSURE: 120 MMHG | TEMPERATURE: 97 F | RESPIRATION RATE: 16 BRPM | HEIGHT: 64 IN | HEART RATE: 78 BPM | DIASTOLIC BLOOD PRESSURE: 70 MMHG | WEIGHT: 160 LBS | OXYGEN SATURATION: 100 % | BODY MASS INDEX: 27.31 KG/M2

## 2021-02-18 DIAGNOSIS — N64.52 BLOODY DISCHARGE FROM LEFT NIPPLE: ICD-10-CM

## 2021-02-18 DIAGNOSIS — N63.0 BREAST MASS: ICD-10-CM

## 2021-02-18 LAB
GLUCOSE BLDC GLUCOMTR-MCNC: 90 MG/DL (ref 70–99)
SARS-COV-2 RNA RESP QL NAA+PROBE: NOT DETECTED

## 2021-02-18 PROCEDURE — 0HBU0ZX EXCISION OF LEFT BREAST, OPEN APPROACH, DIAGNOSTIC: ICD-10-PCS | Performed by: SURGERY

## 2021-02-18 PROCEDURE — 19125 EXCISION BREAST LESION: CPT | Performed by: SURGERY

## 2021-02-18 RX ORDER — MORPHINE SULFATE 4 MG/ML
4 INJECTION, SOLUTION INTRAMUSCULAR; INTRAVENOUS EVERY 10 MIN PRN
Status: DISCONTINUED | OUTPATIENT
Start: 2021-02-18 | End: 2021-02-18

## 2021-02-18 RX ORDER — ONDANSETRON 2 MG/ML
4 INJECTION INTRAMUSCULAR; INTRAVENOUS ONCE AS NEEDED
Status: DISCONTINUED | OUTPATIENT
Start: 2021-02-18 | End: 2021-02-18

## 2021-02-18 RX ORDER — GLYCOPYRROLATE 0.2 MG/ML
INJECTION, SOLUTION INTRAMUSCULAR; INTRAVENOUS AS NEEDED
Status: DISCONTINUED | OUTPATIENT
Start: 2021-02-18 | End: 2021-02-18 | Stop reason: SURG

## 2021-02-18 RX ORDER — ACETAMINOPHEN 500 MG
1000 TABLET ORAL ONCE
Status: COMPLETED | OUTPATIENT
Start: 2021-02-18 | End: 2021-02-18

## 2021-02-18 RX ORDER — HYDROCODONE BITARTRATE AND ACETAMINOPHEN 5; 325 MG/1; MG/1
1 TABLET ORAL AS NEEDED
Status: DISCONTINUED | OUTPATIENT
Start: 2021-02-18 | End: 2021-02-18

## 2021-02-18 RX ORDER — HYDROMORPHONE HYDROCHLORIDE 1 MG/ML
0.2 INJECTION, SOLUTION INTRAMUSCULAR; INTRAVENOUS; SUBCUTANEOUS EVERY 5 MIN PRN
Status: DISCONTINUED | OUTPATIENT
Start: 2021-02-18 | End: 2021-02-18

## 2021-02-18 RX ORDER — SODIUM CHLORIDE, SODIUM LACTATE, POTASSIUM CHLORIDE, CALCIUM CHLORIDE 600; 310; 30; 20 MG/100ML; MG/100ML; MG/100ML; MG/100ML
INJECTION, SOLUTION INTRAVENOUS CONTINUOUS
Status: DISCONTINUED | OUTPATIENT
Start: 2021-02-18 | End: 2021-02-18

## 2021-02-18 RX ORDER — ONDANSETRON 2 MG/ML
INJECTION INTRAMUSCULAR; INTRAVENOUS AS NEEDED
Status: DISCONTINUED | OUTPATIENT
Start: 2021-02-18 | End: 2021-02-18 | Stop reason: SURG

## 2021-02-18 RX ORDER — PROCHLORPERAZINE EDISYLATE 5 MG/ML
5 INJECTION INTRAMUSCULAR; INTRAVENOUS ONCE AS NEEDED
Status: DISCONTINUED | OUTPATIENT
Start: 2021-02-18 | End: 2021-02-18

## 2021-02-18 RX ORDER — MORPHINE SULFATE 10 MG/ML
6 INJECTION, SOLUTION INTRAMUSCULAR; INTRAVENOUS EVERY 10 MIN PRN
Status: DISCONTINUED | OUTPATIENT
Start: 2021-02-18 | End: 2021-02-18

## 2021-02-18 RX ORDER — BUPIVACAINE HYDROCHLORIDE AND EPINEPHRINE 2.5; 5 MG/ML; UG/ML
INJECTION, SOLUTION INFILTRATION; PERINEURAL AS NEEDED
Status: DISCONTINUED | OUTPATIENT
Start: 2021-02-18 | End: 2021-02-18 | Stop reason: HOSPADM

## 2021-02-18 RX ORDER — DEXTROSE MONOHYDRATE 25 G/50ML
50 INJECTION, SOLUTION INTRAVENOUS
Status: DISCONTINUED | OUTPATIENT
Start: 2021-02-18 | End: 2021-02-18

## 2021-02-18 RX ORDER — DEXAMETHASONE SODIUM PHOSPHATE 4 MG/ML
VIAL (ML) INJECTION AS NEEDED
Status: DISCONTINUED | OUTPATIENT
Start: 2021-02-18 | End: 2021-02-18 | Stop reason: SURG

## 2021-02-18 RX ORDER — CEFAZOLIN SODIUM/WATER 2 G/20 ML
2 SYRINGE (ML) INTRAVENOUS ONCE
Status: COMPLETED | OUTPATIENT
Start: 2021-02-18 | End: 2021-02-18

## 2021-02-18 RX ORDER — MORPHINE SULFATE 4 MG/ML
2 INJECTION, SOLUTION INTRAMUSCULAR; INTRAVENOUS EVERY 10 MIN PRN
Status: DISCONTINUED | OUTPATIENT
Start: 2021-02-18 | End: 2021-02-18

## 2021-02-18 RX ORDER — HYDROMORPHONE HYDROCHLORIDE 1 MG/ML
0.4 INJECTION, SOLUTION INTRAMUSCULAR; INTRAVENOUS; SUBCUTANEOUS EVERY 5 MIN PRN
Status: DISCONTINUED | OUTPATIENT
Start: 2021-02-18 | End: 2021-02-18

## 2021-02-18 RX ORDER — HYDROMORPHONE HYDROCHLORIDE 1 MG/ML
0.6 INJECTION, SOLUTION INTRAMUSCULAR; INTRAVENOUS; SUBCUTANEOUS EVERY 5 MIN PRN
Status: DISCONTINUED | OUTPATIENT
Start: 2021-02-18 | End: 2021-02-18

## 2021-02-18 RX ORDER — LIDOCAINE HYDROCHLORIDE 10 MG/ML
INJECTION, SOLUTION EPIDURAL; INFILTRATION; INTRACAUDAL; PERINEURAL AS NEEDED
Status: DISCONTINUED | OUTPATIENT
Start: 2021-02-18 | End: 2021-02-18 | Stop reason: SURG

## 2021-02-18 RX ORDER — IBUPROFEN 600 MG/1
600 TABLET ORAL EVERY 6 HOURS PRN
Qty: 15 TABLET | Refills: 1 | Status: SHIPPED | OUTPATIENT
Start: 2021-02-18 | End: 2021-02-25

## 2021-02-18 RX ORDER — HYDROCODONE BITARTRATE AND ACETAMINOPHEN 5; 325 MG/1; MG/1
1 TABLET ORAL EVERY 6 HOURS PRN
Qty: 10 TABLET | Refills: 0 | Status: SHIPPED | OUTPATIENT
Start: 2021-02-18 | End: 2021-06-29

## 2021-02-18 RX ORDER — HYDROCODONE BITARTRATE AND ACETAMINOPHEN 5; 325 MG/1; MG/1
2 TABLET ORAL AS NEEDED
Status: DISCONTINUED | OUTPATIENT
Start: 2021-02-18 | End: 2021-02-18

## 2021-02-18 RX ORDER — NALOXONE HYDROCHLORIDE 0.4 MG/ML
80 INJECTION, SOLUTION INTRAMUSCULAR; INTRAVENOUS; SUBCUTANEOUS AS NEEDED
Status: DISCONTINUED | OUTPATIENT
Start: 2021-02-18 | End: 2021-02-18

## 2021-02-18 RX ORDER — MIDAZOLAM HYDROCHLORIDE 1 MG/ML
INJECTION INTRAMUSCULAR; INTRAVENOUS AS NEEDED
Status: DISCONTINUED | OUTPATIENT
Start: 2021-02-18 | End: 2021-02-18 | Stop reason: SURG

## 2021-02-18 RX ADMIN — LIDOCAINE HYDROCHLORIDE 50 MG: 10 INJECTION, SOLUTION EPIDURAL; INFILTRATION; INTRACAUDAL; PERINEURAL at 11:58:00

## 2021-02-18 RX ADMIN — MIDAZOLAM HYDROCHLORIDE 1 MG: 1 INJECTION INTRAMUSCULAR; INTRAVENOUS at 11:53:00

## 2021-02-18 RX ADMIN — GLYCOPYRROLATE 0.1 MG: 0.2 INJECTION, SOLUTION INTRAMUSCULAR; INTRAVENOUS at 11:53:00

## 2021-02-18 RX ADMIN — ONDANSETRON 4 MG: 2 INJECTION INTRAMUSCULAR; INTRAVENOUS at 12:19:00

## 2021-02-18 RX ADMIN — DEXAMETHASONE SODIUM PHOSPHATE 4 MG: 4 MG/ML VIAL (ML) INJECTION at 12:03:00

## 2021-02-18 RX ADMIN — CEFAZOLIN SODIUM/WATER 2 G: 2 G/20 ML SYRINGE (ML) INTRAVENOUS at 11:56:00

## 2021-02-18 RX ADMIN — SODIUM CHLORIDE, SODIUM LACTATE, POTASSIUM CHLORIDE, CALCIUM CHLORIDE: 600; 310; 30; 20 INJECTION, SOLUTION INTRAVENOUS at 12:22:00

## 2021-02-18 NOTE — ANESTHESIA POSTPROCEDURE EVALUATION
Patient: Trish Salazar    Procedure Summary     Date: 02/18/21 Room / Location: [facility] OR 02 / [facility] OR    Anesthesia Start: 0623 Anesthesia Stop: 6417    Procedure: BREAST BIOPSY (Left Breast) Diagnosis:       Breast mass      Bloody discharge from le

## 2021-02-18 NOTE — INTERVAL H&P NOTE
Pre-op Diagnosis: Breast mass [N63.0]  Bloody discharge from left nipple [N64.52]    The above referenced H&P was reviewed by Braden Sanchez MD on 2/18/2021, the patient was examined and no significant changes have occurred in the patient's condition since

## 2021-02-18 NOTE — ANESTHESIA PROCEDURE NOTES
Airway  Urgency: Elective      General Information and Staff    Patient location during procedure: OR  Anesthesiologist: Lexus Conklin MD  Resident/CRNA: Eugene Almodovar CRNA  Performed: CRNA     Indications and Patient Condition  Indications for ai

## 2021-02-18 NOTE — ANESTHESIA PREPROCEDURE EVALUATION
Anesthesia PreOp Note    HPI:     Daine Epley is a 57year old female who presents for preoperative consultation requested by: Sobeida Upton MD    Date of Surgery: 2/18/2021    Procedure(s):  BREAST BIOPSY  Indication: Breast mass [N63.0]  Bloody dischar Known Allergies    Family History   Problem Relation Age of Onset   • Diabetes Father    • Hypertension Mother    • Arthritis Mother         rheumatoid   • Psoriasis Mother    • Diabetes Brother         per NG: \"four brothers have diabetes\"   • Cancer Pa Asked        Stress Concern: Not Asked        Weight Concern: Not Asked        Special Diet: Not Asked        Back Care: Not Asked        Exercise: Yes          walk 2 times daily        Bike Helmet: Not Asked        Seat Belt: Not Asked        Self-Exams: guardian or family member of the nature of the anesthetic plan, benefits, risks including possible dental damage if relevant, major complications, and any alternative forms of anesthetic management.    All of the patient's questions were answered to the bes

## 2021-02-19 NOTE — OPERATIVE REPORT
[facility]    PATIENT'S NAME: Johnny Mccoy   ATTENDING PHYSICIAN: Mark Cordero MD   OPERATING PHYSICIAN: Mark Cordero MD   PATIENT ACCOUNT#:   [id number]    LOCATION:  [facility] 5 [facility] 10  MEDICAL RECORD #:   [id number]       DATE OF BIRTH:

## 2021-03-03 ENCOUNTER — OFFICE VISIT (OUTPATIENT)
Dept: SURGERY | Facility: CLINIC | Age: 61
End: 2021-03-03
Payer: MEDICAID

## 2021-03-03 VITALS — HEIGHT: 64 IN | BODY MASS INDEX: 27.31 KG/M2 | WEIGHT: 160 LBS

## 2021-03-03 DIAGNOSIS — Z98.890 POST-OPERATIVE STATE: Primary | ICD-10-CM

## 2021-03-03 PROCEDURE — 99024 POSTOP FOLLOW-UP VISIT: CPT | Performed by: SURGERY

## 2021-03-03 PROCEDURE — 3008F BODY MASS INDEX DOCD: CPT | Performed by: SURGERY

## 2021-03-03 NOTE — PROGRESS NOTES
Postoperative Patient Follow-up      3/3/2021    HPI  Postoperative visit    Emma Barber is a 57year old female post left breast lumpectomy. Pathology revealing diffuse papillomatosis. Exam  Mild swelling.   Incision clean dry intact, mild epidermal

## 2021-06-29 ENCOUNTER — OFFICE VISIT (OUTPATIENT)
Dept: FAMILY MEDICINE CLINIC | Facility: CLINIC | Age: 61
End: 2021-06-29
Payer: COMMERCIAL

## 2021-06-29 ENCOUNTER — LAB ENCOUNTER (OUTPATIENT)
Dept: LAB | Age: 61
End: 2021-06-29
Attending: FAMILY MEDICINE
Payer: MEDICAID

## 2021-06-29 VITALS
HEIGHT: 64 IN | BODY MASS INDEX: 28.57 KG/M2 | SYSTOLIC BLOOD PRESSURE: 138 MMHG | DIASTOLIC BLOOD PRESSURE: 82 MMHG | WEIGHT: 167.38 LBS | TEMPERATURE: 97 F | HEART RATE: 72 BPM

## 2021-06-29 DIAGNOSIS — E11.9 TYPE 2 DIABETES MELLITUS WITHOUT COMPLICATION, WITHOUT LONG-TERM CURRENT USE OF INSULIN (HCC): ICD-10-CM

## 2021-06-29 DIAGNOSIS — Z12.4 PAP SMEAR FOR CERVICAL CANCER SCREENING: ICD-10-CM

## 2021-06-29 DIAGNOSIS — Z00.00 ROUTINE HEALTH MAINTENANCE: Primary | ICD-10-CM

## 2021-06-29 DIAGNOSIS — R10.2 PELVIC PAIN IN FEMALE: ICD-10-CM

## 2021-06-29 LAB
ALBUMIN SERPL-MCNC: 4 G/DL (ref 3.4–5)
ALBUMIN/GLOB SERPL: 1 {RATIO} (ref 1–2)
ALP LIVER SERPL-CCNC: 101 U/L
ALT SERPL-CCNC: 30 U/L
ANION GAP SERPL CALC-SCNC: 7 MMOL/L (ref 0–18)
AST SERPL-CCNC: 23 U/L (ref 15–37)
BILIRUB SERPL-MCNC: 0.3 MG/DL (ref 0.1–2)
BUN BLD-MCNC: 9 MG/DL (ref 7–18)
BUN/CREAT SERPL: 11 (ref 10–20)
CALCIUM BLD-MCNC: 10.1 MG/DL (ref 8.5–10.1)
CHLORIDE SERPL-SCNC: 105 MMOL/L (ref 98–112)
CHOLEST SMN-MCNC: 182 MG/DL (ref ?–200)
CO2 SERPL-SCNC: 27 MMOL/L (ref 21–32)
CREAT BLD-MCNC: 0.82 MG/DL
GLOBULIN PLAS-MCNC: 3.9 G/DL (ref 2.8–4.4)
GLUCOSE BLD-MCNC: 106 MG/DL (ref 70–99)
HDLC SERPL-MCNC: 54 MG/DL (ref 40–59)
LDLC SERPL CALC-MCNC: 110 MG/DL (ref ?–100)
M PROTEIN MFR SERPL ELPH: 7.9 G/DL (ref 6.4–8.2)
NONHDLC SERPL-MCNC: 128 MG/DL (ref ?–130)
OSMOLALITY SERPL CALC.SUM OF ELEC: 287 MOSM/KG (ref 275–295)
PATIENT FASTING Y/N/NP: NO
PATIENT FASTING Y/N/NP: NO
POTASSIUM SERPL-SCNC: 4.4 MMOL/L (ref 3.5–5.1)
SODIUM SERPL-SCNC: 139 MMOL/L (ref 136–145)
TRIGL SERPL-MCNC: 100 MG/DL (ref 30–149)
VLDLC SERPL CALC-MCNC: 17 MG/DL (ref 0–30)

## 2021-06-29 PROCEDURE — 3008F BODY MASS INDEX DOCD: CPT | Performed by: FAMILY MEDICINE

## 2021-06-29 PROCEDURE — 3075F SYST BP GE 130 - 139MM HG: CPT | Performed by: FAMILY MEDICINE

## 2021-06-29 PROCEDURE — 3044F HG A1C LEVEL LT 7.0%: CPT | Performed by: FAMILY MEDICINE

## 2021-06-29 PROCEDURE — 83036 HEMOGLOBIN GLYCOSYLATED A1C: CPT | Performed by: FAMILY MEDICINE

## 2021-06-29 PROCEDURE — 81002 URINALYSIS NONAUTO W/O SCOPE: CPT | Performed by: FAMILY MEDICINE

## 2021-06-29 PROCEDURE — 90471 IMMUNIZATION ADMIN: CPT | Performed by: FAMILY MEDICINE

## 2021-06-29 PROCEDURE — 36415 COLL VENOUS BLD VENIPUNCTURE: CPT | Performed by: FAMILY MEDICINE

## 2021-06-29 PROCEDURE — 99396 PREV VISIT EST AGE 40-64: CPT | Performed by: FAMILY MEDICINE

## 2021-06-29 PROCEDURE — 90732 PPSV23 VACC 2 YRS+ SUBQ/IM: CPT | Performed by: FAMILY MEDICINE

## 2021-06-29 PROCEDURE — 80061 LIPID PANEL: CPT

## 2021-06-29 PROCEDURE — 36415 COLL VENOUS BLD VENIPUNCTURE: CPT

## 2021-06-29 PROCEDURE — 80053 COMPREHEN METABOLIC PANEL: CPT

## 2021-06-29 PROCEDURE — 3079F DIAST BP 80-89 MM HG: CPT | Performed by: FAMILY MEDICINE

## 2021-06-29 NOTE — PROGRESS NOTES
Celia Cameron is a 57year old female. Patient presents with:  Routine Physical  Pap: cramping in lower abdomen x 1 month, intermittent sharp pain      HPI:   Patient is a 65-year-old female who presents today for a routine physical exam with Pap smear.   P throat  SKIN: denies any unusual skin lesions or rashes  RESPIRATORY: denies shortness of breath, cough, wheezing  CARDIOVASCULAR: denies chest pain on exertion, palpitations, swelling in feet  GI: denies abdominal pain and denies heartburn, nausea or vomi understanding of these issues and agrees to the plan. No follow-ups on file.

## 2021-07-07 ENCOUNTER — TELEPHONE (OUTPATIENT)
Dept: FAMILY MEDICINE CLINIC | Facility: CLINIC | Age: 61
End: 2021-07-07

## 2021-07-07 NOTE — TELEPHONE ENCOUNTER
Patient is still having pelvic cramping/fullness. She was seen by Dr. Kulwinder Calvillo on 6/29/21 and was told to take a probiotic however it has not helped. She is having urinary frequency. The pain comes and goes and wraps around to her back.  She describes the pain

## 2021-07-13 ENCOUNTER — OFFICE VISIT (OUTPATIENT)
Dept: FAMILY MEDICINE CLINIC | Facility: CLINIC | Age: 61
End: 2021-07-13
Payer: MEDICAID

## 2021-07-13 VITALS
DIASTOLIC BLOOD PRESSURE: 72 MMHG | SYSTOLIC BLOOD PRESSURE: 136 MMHG | HEIGHT: 64 IN | BODY MASS INDEX: 28.65 KG/M2 | HEART RATE: 69 BPM | TEMPERATURE: 98 F | WEIGHT: 167.81 LBS

## 2021-07-13 DIAGNOSIS — D25.1 INTRAMURAL LEIOMYOMA OF UTERUS: Primary | ICD-10-CM

## 2021-07-13 LAB
APPEARANCE: CLEAR
BILIRUBIN: NEGATIVE
GLUCOSE (URINE DIPSTICK): NEGATIVE MG/DL
KETONES (URINE DIPSTICK): NEGATIVE MG/DL
LEUKOCYTES: NEGATIVE
MULTISTIX LOT#: 5077 NUMERIC
NITRITE, URINE: NEGATIVE
PH, URINE: 7 (ref 4.5–8)
PROTEIN (URINE DIPSTICK): NEGATIVE MG/DL
SPECIFIC GRAVITY: 1.01 (ref 1–1.03)
URINE-COLOR: YELLOW
UROBILINOGEN,SEMI-QN: 0.2 MG/DL (ref 0–1.9)

## 2021-07-13 PROCEDURE — 3078F DIAST BP <80 MM HG: CPT | Performed by: FAMILY MEDICINE

## 2021-07-13 PROCEDURE — 99213 OFFICE O/P EST LOW 20 MIN: CPT | Performed by: FAMILY MEDICINE

## 2021-07-13 PROCEDURE — 3008F BODY MASS INDEX DOCD: CPT | Performed by: FAMILY MEDICINE

## 2021-07-13 PROCEDURE — 3075F SYST BP GE 130 - 139MM HG: CPT | Performed by: FAMILY MEDICINE

## 2021-07-13 PROCEDURE — 81002 URINALYSIS NONAUTO W/O SCOPE: CPT | Performed by: FAMILY MEDICINE

## 2021-07-13 NOTE — PROGRESS NOTES
Cammie Joshi is a 57year old female.   Patient presents with:  Pelvic: feeling bloated and cramping as if she is about to get her period, discomfort radiates to lower back, experiencing urgency       HPI:   Patient is a 59-year-old female who presents toda wheezing  CARDIOVASCULAR: denies chest pain on exertion, palpitations, swelling in feet  GI: denies abdominal pain and denies heartburn, nausea or vomiting  : No Pain on urination, change in the color of urine, discharge, urinating frequently  MUS: No ba

## 2021-08-03 ENCOUNTER — HOSPITAL ENCOUNTER (OUTPATIENT)
Dept: ULTRASOUND IMAGING | Age: 61
Discharge: HOME | End: 2021-08-03
Attending: FAMILY MEDICINE
Payer: MEDICAID

## 2021-08-03 DIAGNOSIS — D25.1 INTRAMURAL LEIOMYOMA OF UTERUS: ICD-10-CM

## 2021-08-03 PROCEDURE — 76830 TRANSVAGINAL US NON-OB: CPT | Performed by: FAMILY MEDICINE

## 2021-08-03 PROCEDURE — 76856 US EXAM PELVIC COMPLETE: CPT | Performed by: FAMILY MEDICINE

## 2021-08-10 ENCOUNTER — TELEPHONE (OUTPATIENT)
Dept: FAMILY MEDICINE CLINIC | Facility: CLINIC | Age: 61
End: 2021-08-10

## 2021-08-10 DIAGNOSIS — D25.1 INTRAMURAL LEIOMYOMA OF UTERUS: Primary | ICD-10-CM

## 2021-08-10 NOTE — TELEPHONE ENCOUNTER
Called and spoke with patient about pelvic ultrasound results. Patient continues to have a large fibroid in her uterus. Patient states it frequently puts pressure on her bladder makes her feel like she has to urinate.   We will have patient see Dr. Ceci Hui

## 2021-08-10 NOTE — TELEPHONE ENCOUNTER
Called patient discussed fibroid on ultrasound given referral for Dr. Ivy Bell she will make an appointment.

## 2022-03-01 ENCOUNTER — OFFICE VISIT (OUTPATIENT)
Dept: FAMILY MEDICINE CLINIC | Facility: CLINIC | Age: 62
End: 2022-03-01
Payer: MEDICAID

## 2022-03-01 VITALS
SYSTOLIC BLOOD PRESSURE: 132 MMHG | HEIGHT: 64 IN | DIASTOLIC BLOOD PRESSURE: 76 MMHG | BODY MASS INDEX: 27.93 KG/M2 | WEIGHT: 163.63 LBS | HEART RATE: 74 BPM

## 2022-03-01 DIAGNOSIS — E11.9 TYPE 2 DIABETES MELLITUS WITHOUT COMPLICATION, WITHOUT LONG-TERM CURRENT USE OF INSULIN (HCC): Primary | ICD-10-CM

## 2022-03-01 DIAGNOSIS — Z12.31 ENCOUNTER FOR SCREENING MAMMOGRAM FOR BREAST CANCER: ICD-10-CM

## 2022-03-01 DIAGNOSIS — K75.81 NONALCOHOLIC STEATOHEPATITIS (NASH): ICD-10-CM

## 2022-03-01 LAB
CARTRIDGE LOT#: 861 NUMERIC
HEMOGLOBIN A1C: 6.4 % (ref 4.3–5.6)

## 2022-03-01 PROCEDURE — 99214 OFFICE O/P EST MOD 30 MIN: CPT | Performed by: FAMILY MEDICINE

## 2022-03-01 PROCEDURE — 3075F SYST BP GE 130 - 139MM HG: CPT | Performed by: FAMILY MEDICINE

## 2022-03-01 PROCEDURE — 3008F BODY MASS INDEX DOCD: CPT | Performed by: FAMILY MEDICINE

## 2022-03-01 PROCEDURE — 3044F HG A1C LEVEL LT 7.0%: CPT | Performed by: FAMILY MEDICINE

## 2022-03-01 PROCEDURE — 36415 COLL VENOUS BLD VENIPUNCTURE: CPT | Performed by: FAMILY MEDICINE

## 2022-03-01 PROCEDURE — 83036 HEMOGLOBIN GLYCOSYLATED A1C: CPT | Performed by: FAMILY MEDICINE

## 2022-03-01 PROCEDURE — 3078F DIAST BP <80 MM HG: CPT | Performed by: FAMILY MEDICINE

## 2022-03-21 ENCOUNTER — HOSPITAL ENCOUNTER (OUTPATIENT)
Dept: MAMMOGRAPHY | Age: 62
Discharge: HOME | End: 2022-03-21
Attending: FAMILY MEDICINE
Payer: MEDICAID

## 2022-03-21 DIAGNOSIS — Z12.31 ENCOUNTER FOR SCREENING MAMMOGRAM FOR BREAST CANCER: ICD-10-CM

## 2022-03-21 PROCEDURE — 77067 SCR MAMMO BI INCL CAD: CPT | Performed by: FAMILY MEDICINE

## 2022-03-21 PROCEDURE — 77063 BREAST TOMOSYNTHESIS BI: CPT | Performed by: FAMILY MEDICINE

## 2022-07-13 ENCOUNTER — OFFICE VISIT (OUTPATIENT)
Dept: FAMILY MEDICINE CLINIC | Facility: CLINIC | Age: 62
End: 2022-07-13
Payer: MEDICAID

## 2022-07-13 ENCOUNTER — NURSE TRIAGE (OUTPATIENT)
Dept: FAMILY MEDICINE CLINIC | Facility: CLINIC | Age: 62
End: 2022-07-13

## 2022-07-13 VITALS
WEIGHT: 167 LBS | SYSTOLIC BLOOD PRESSURE: 124 MMHG | HEIGHT: 64 IN | OXYGEN SATURATION: 99 % | RESPIRATION RATE: 18 BRPM | HEART RATE: 72 BPM | DIASTOLIC BLOOD PRESSURE: 68 MMHG | BODY MASS INDEX: 28.51 KG/M2

## 2022-07-13 DIAGNOSIS — R30.0 DYSURIA: ICD-10-CM

## 2022-07-13 DIAGNOSIS — R35.0 URINARY FREQUENCY: Primary | ICD-10-CM

## 2022-07-13 LAB
APPEARANCE: CLEAR
BILIRUBIN: NEGATIVE
GLUCOSE (URINE DIPSTICK): NEGATIVE MG/DL
KETONES (URINE DIPSTICK): NEGATIVE MG/DL
MULTISTIX LOT#: ABNORMAL NUMERIC
NITRITE, URINE: NEGATIVE
PH, URINE: 6 (ref 4.5–8)
PROTEIN (URINE DIPSTICK): NEGATIVE MG/DL
SPECIFIC GRAVITY: 1.02 (ref 1–1.03)
URINE-COLOR: YELLOW
UROBILINOGEN,SEMI-QN: 0.2 MG/DL (ref 0–1.9)

## 2022-07-13 PROCEDURE — 81002 URINALYSIS NONAUTO W/O SCOPE: CPT | Performed by: FAMILY MEDICINE

## 2022-07-13 PROCEDURE — 3078F DIAST BP <80 MM HG: CPT | Performed by: FAMILY MEDICINE

## 2022-07-13 PROCEDURE — 3008F BODY MASS INDEX DOCD: CPT | Performed by: FAMILY MEDICINE

## 2022-07-13 PROCEDURE — 3074F SYST BP LT 130 MM HG: CPT | Performed by: FAMILY MEDICINE

## 2022-07-13 PROCEDURE — 99213 OFFICE O/P EST LOW 20 MIN: CPT | Performed by: FAMILY MEDICINE

## 2022-07-13 RX ORDER — NITROFURANTOIN 25; 75 MG/1; MG/1
100 CAPSULE ORAL 2 TIMES DAILY
Qty: 14 CAPSULE | Refills: 0 | Status: SHIPPED | OUTPATIENT
Start: 2022-07-13 | End: 2022-07-20

## 2022-07-21 ENCOUNTER — TELEPHONE (OUTPATIENT)
Dept: FAMILY MEDICINE CLINIC | Facility: CLINIC | Age: 62
End: 2022-07-21

## 2022-07-21 ENCOUNTER — OFFICE VISIT (OUTPATIENT)
Dept: FAMILY MEDICINE CLINIC | Facility: CLINIC | Age: 62
End: 2022-07-21
Payer: MEDICAID

## 2022-07-21 VITALS
WEIGHT: 167 LBS | SYSTOLIC BLOOD PRESSURE: 136 MMHG | HEART RATE: 80 BPM | TEMPERATURE: 98 F | DIASTOLIC BLOOD PRESSURE: 83 MMHG | BODY MASS INDEX: 29 KG/M2

## 2022-07-21 DIAGNOSIS — R35.0 URINARY FREQUENCY: Primary | ICD-10-CM

## 2022-07-21 DIAGNOSIS — R10.2 PELVIC PAIN IN FEMALE: ICD-10-CM

## 2022-07-21 LAB
APPEARANCE: CLEAR
BILIRUBIN: NEGATIVE
GLUCOSE (URINE DIPSTICK): NEGATIVE MG/DL
KETONES (URINE DIPSTICK): NEGATIVE MG/DL
MULTISTIX LOT#: ABNORMAL NUMERIC
NITRITE, URINE: NEGATIVE
OCCULT BLOOD: NEGATIVE
PH, URINE: 6.5 (ref 4.5–8)
PROTEIN (URINE DIPSTICK): NEGATIVE MG/DL
SPECIFIC GRAVITY: 1.01 (ref 1–1.03)
URINE-COLOR: YELLOW
UROBILINOGEN,SEMI-QN: 0.2 MG/DL (ref 0–1.9)

## 2022-07-21 PROCEDURE — 3075F SYST BP GE 130 - 139MM HG: CPT | Performed by: FAMILY MEDICINE

## 2022-07-21 PROCEDURE — 3079F DIAST BP 80-89 MM HG: CPT | Performed by: FAMILY MEDICINE

## 2022-07-21 PROCEDURE — 99213 OFFICE O/P EST LOW 20 MIN: CPT | Performed by: FAMILY MEDICINE

## 2022-07-21 PROCEDURE — 81002 URINALYSIS NONAUTO W/O SCOPE: CPT | Performed by: FAMILY MEDICINE

## 2022-07-21 NOTE — TELEPHONE ENCOUNTER
Patient seen in the office on 7/13/22 with Dr. Radha Vu. She submitted a UA and a culture was also ordered but it was ordered to be done at [facility] and it was never completed. Only the UA was done. She was prescribed Macrobid and took it. She is feeling lower abdominal pain and doesn't feel any better after taking the antibiotic. I explained the Culture was ordered and it would be more indicative of which antibiotic would be best. I advised due to the abdominal pain (pt stated \"it feels like I'm having a baby. \") to be seen in the office with her pcp.  Scheduled for today at 4:30

## 2022-08-10 ENCOUNTER — HOSPITAL ENCOUNTER (OUTPATIENT)
Dept: ULTRASOUND IMAGING | Age: 62
Discharge: HOME | End: 2022-08-10
Attending: FAMILY MEDICINE
Payer: MEDICAID

## 2022-08-10 DIAGNOSIS — R10.2 PELVIC PAIN IN FEMALE: ICD-10-CM

## 2022-08-10 DIAGNOSIS — R35.0 URINARY FREQUENCY: ICD-10-CM

## 2022-08-10 PROCEDURE — 76856 US EXAM PELVIC COMPLETE: CPT | Performed by: FAMILY MEDICINE

## 2022-08-10 PROCEDURE — 76830 TRANSVAGINAL US NON-OB: CPT | Performed by: FAMILY MEDICINE

## 2022-09-08 ENCOUNTER — LAB ENCOUNTER (OUTPATIENT)
Dept: LAB | Age: 62
End: 2022-09-08
Attending: FAMILY MEDICINE
Payer: MEDICAID

## 2022-09-08 ENCOUNTER — OFFICE VISIT (OUTPATIENT)
Dept: FAMILY MEDICINE CLINIC | Facility: CLINIC | Age: 62
End: 2022-09-08
Payer: MEDICAID

## 2022-09-08 VITALS
SYSTOLIC BLOOD PRESSURE: 122 MMHG | HEIGHT: 64 IN | HEART RATE: 77 BPM | BODY MASS INDEX: 27.96 KG/M2 | OXYGEN SATURATION: 97 % | DIASTOLIC BLOOD PRESSURE: 83 MMHG | RESPIRATION RATE: 18 BRPM | WEIGHT: 163.81 LBS | TEMPERATURE: 98 F

## 2022-09-08 DIAGNOSIS — E11.9 TYPE 2 DIABETES MELLITUS WITHOUT COMPLICATION, WITHOUT LONG-TERM CURRENT USE OF INSULIN (HCC): ICD-10-CM

## 2022-09-08 DIAGNOSIS — Z00.00 ROUTINE HEALTH MAINTENANCE: Primary | ICD-10-CM

## 2022-09-08 DIAGNOSIS — D25.1 INTRAMURAL LEIOMYOMA OF UTERUS: ICD-10-CM

## 2022-09-08 LAB
CARTRIDGE LOT#: ABNORMAL NUMERIC
HEMOGLOBIN A1C: 6.3 % (ref 4.3–5.6)

## 2022-09-08 PROCEDURE — 3079F DIAST BP 80-89 MM HG: CPT | Performed by: FAMILY MEDICINE

## 2022-09-08 PROCEDURE — 83036 HEMOGLOBIN GLYCOSYLATED A1C: CPT | Performed by: FAMILY MEDICINE

## 2022-09-08 PROCEDURE — 99396 PREV VISIT EST AGE 40-64: CPT | Performed by: FAMILY MEDICINE

## 2022-09-08 PROCEDURE — 3008F BODY MASS INDEX DOCD: CPT | Performed by: FAMILY MEDICINE

## 2022-09-08 PROCEDURE — 3074F SYST BP LT 130 MM HG: CPT | Performed by: FAMILY MEDICINE

## 2022-09-08 PROCEDURE — 3044F HG A1C LEVEL LT 7.0%: CPT | Performed by: FAMILY MEDICINE

## 2022-09-09 LAB
ALBUMIN/GLOBULIN RATIO: 1.6 (CALC) (ref 1–2.5)
ALBUMIN: 4.4 G/DL (ref 3.6–5.1)
ALKALINE PHOSPHATASE: 86 U/L (ref 37–153)
ALT: 20 U/L (ref 6–29)
AST: 21 U/L (ref 10–35)
BILIRUBIN, TOTAL: 0.3 MG/DL (ref 0.2–1.2)
BUN: 13 MG/DL (ref 7–25)
CALCIUM: 10.2 MG/DL (ref 8.6–10.4)
CARBON DIOXIDE: 26 MMOL/L (ref 20–32)
CHLORIDE: 103 MMOL/L (ref 98–110)
CHOL/HDLC RATIO: 3.1 (CALC)
CHOLESTEROL, TOTAL: 165 MG/DL
CREATININE, RANDOM URINE: 61 MG/DL (ref 20–275)
CREATININE: 0.78 MG/DL (ref 0.5–1.05)
EGFR: 85 ML/MIN/1.73M2
GLOBULIN: 2.7 G/DL (CALC) (ref 1.9–3.7)
GLUCOSE: 117 MG/DL (ref 65–99)
HDL CHOLESTEROL: 53 MG/DL
HEMATOCRIT: 41.6 % (ref 35–45)
HEMOGLOBIN: 14.1 G/DL (ref 11.7–15.5)
LDL-CHOLESTEROL: 87 MG/DL (CALC)
MCH: 30.5 PG (ref 27–33)
MCHC: 33.9 G/DL (ref 32–36)
MCV: 90 FL (ref 80–100)
MICROALBUMIN/CREATININE RATIO, RANDOM URINE: 7 MCG/MG CREAT
MICROALBUMIN: 0.4 MG/DL
MPV: 10.4 FL (ref 7.5–12.5)
NON-HDL CHOLESTEROL: 112 MG/DL (CALC)
PLATELET COUNT: 297 THOUSAND/UL (ref 140–400)
POTASSIUM: 4.2 MMOL/L (ref 3.5–5.3)
PROTEIN, TOTAL: 7.1 G/DL (ref 6.1–8.1)
RDW: 13.3 % (ref 11–15)
RED BLOOD CELL COUNT: 4.62 MILLION/UL (ref 3.8–5.1)
SODIUM: 137 MMOL/L (ref 135–146)
TRIGLYCERIDES: 153 MG/DL
WHITE BLOOD CELL COUNT: 6.9 THOUSAND/UL (ref 3.8–10.8)

## 2022-09-12 ENCOUNTER — TELEPHONE (OUTPATIENT)
Dept: FAMILY MEDICINE CLINIC | Facility: CLINIC | Age: 62
End: 2022-09-12

## 2022-09-20 NOTE — TELEPHONE ENCOUNTER
Pt states she is waiting on a call back to advise if her MRI abdomen requires PA, pt states she was in yesterday for OV, and was advised Rn will call her today. See patient notes for this appointment date.

## 2023-03-30 ENCOUNTER — NURSE TRIAGE (OUTPATIENT)
Dept: FAMILY MEDICINE CLINIC | Facility: CLINIC | Age: 63
End: 2023-03-30

## 2023-03-30 ENCOUNTER — TELEPHONE (OUTPATIENT)
Dept: FAMILY MEDICINE CLINIC | Facility: CLINIC | Age: 63
End: 2023-03-30

## 2023-03-30 DIAGNOSIS — Z12.31 BREAST CANCER SCREENING BY MAMMOGRAM: Primary | ICD-10-CM

## 2023-05-01 ENCOUNTER — HOSPITAL ENCOUNTER (OUTPATIENT)
Dept: MAMMOGRAPHY | Facility: HOSPITAL | Age: 63
Discharge: HOME | End: 2023-05-01
Attending: FAMILY MEDICINE
Payer: MEDICAID

## 2023-05-01 DIAGNOSIS — Z12.31 BREAST CANCER SCREENING BY MAMMOGRAM: ICD-10-CM

## 2023-05-01 PROCEDURE — 77067 SCR MAMMO BI INCL CAD: CPT | Performed by: FAMILY MEDICINE

## 2023-05-01 PROCEDURE — 77063 BREAST TOMOSYNTHESIS BI: CPT | Performed by: FAMILY MEDICINE

## 2023-05-02 ENCOUNTER — LAB ENCOUNTER (OUTPATIENT)
Dept: LAB | Facility: HOSPITAL | Age: 63
End: 2023-05-02
Attending: NURSE PRACTITIONER
Payer: MEDICAID

## 2023-05-02 ENCOUNTER — OFFICE VISIT (OUTPATIENT)
Facility: CLINIC | Age: 63
End: 2023-05-02

## 2023-05-02 ENCOUNTER — TELEPHONE (OUTPATIENT)
Facility: CLINIC | Age: 63
End: 2023-05-02

## 2023-05-02 VITALS
HEIGHT: 62 IN | DIASTOLIC BLOOD PRESSURE: 81 MMHG | SYSTOLIC BLOOD PRESSURE: 130 MMHG | WEIGHT: 170 LBS | BODY MASS INDEX: 31.28 KG/M2 | HEART RATE: 91 BPM

## 2023-05-02 DIAGNOSIS — K76.0 NAFLD (NONALCOHOLIC FATTY LIVER DISEASE): Primary | ICD-10-CM

## 2023-05-02 DIAGNOSIS — Z86.010 PERSONAL HISTORY OF COLONIC POLYPS: ICD-10-CM

## 2023-05-02 DIAGNOSIS — K21.9 GASTROESOPHAGEAL REFLUX DISEASE, UNSPECIFIED WHETHER ESOPHAGITIS PRESENT: ICD-10-CM

## 2023-05-02 DIAGNOSIS — Z86.010 HX OF COLONIC POLYPS: Primary | ICD-10-CM

## 2023-05-02 DIAGNOSIS — R10.11 RUQ PAIN: ICD-10-CM

## 2023-05-02 LAB
ALBUMIN SERPL-MCNC: 3.7 G/DL (ref 3.4–5)
ALP LIVER SERPL-CCNC: 93 U/L
ALT SERPL-CCNC: 29 U/L
AST SERPL-CCNC: 23 U/L (ref 15–37)
BILIRUB DIRECT SERPL-MCNC: <0.1 MG/DL (ref 0–0.2)
BILIRUB SERPL-MCNC: 0.3 MG/DL (ref 0.1–2)
PROT SERPL-MCNC: 7.4 G/DL (ref 6.4–8.2)

## 2023-05-02 PROCEDURE — 80076 HEPATIC FUNCTION PANEL: CPT | Performed by: NURSE PRACTITIONER

## 2023-05-02 PROCEDURE — 36415 COLL VENOUS BLD VENIPUNCTURE: CPT | Performed by: NURSE PRACTITIONER

## 2023-05-02 RX ORDER — POLYETHYLENE GLYCOL 3350, SODIUM CHLORIDE, SODIUM BICARBONATE, POTASSIUM CHLORIDE 420; 11.2; 5.72; 1.48 G/4L; G/4L; G/4L; G/4L
POWDER, FOR SOLUTION ORAL
Qty: 4000 ML | Refills: 0 | Status: SHIPPED | OUTPATIENT
Start: 2023-05-02

## 2023-05-02 NOTE — TELEPHONE ENCOUNTER
Scheduled for:  Colonoscopy [id number]  Provider Name:  Dr Sissy Paulson  Date:  10/31/2023   Location:  [facility]  Sedation:  MAC  Time:  10:30 am. (pt is aware to arrive at 09:30 am)  Prep:  Split Trilyte  Meds/Allergies Reconciled?:  Margorie Bence NP, Reviewed   Diagnosis with codes:  Colon Polyps Z86.010  Was patient informed to call insurance with codes (Y/N):  yes   Referral sent?:  Referral was sent at the time of electronic surgical scheduling. [address] [place] [address] or [facility] notified?: I sent an electronic request to Endo Scheduling and received a confirmation today. Medication Orders:  Pt is aware to NOT take iron pills, herbal meds and diet supplements for 7 days before exam. Also to NOT take any form of alcohol, recreational drugs and any forms of ED meds 24 hours before exam.   Misc Orders:  N/A   Further instructions given by staff:  I discussed the prep instructions with the patient which she verbally understood. I provided patient with prep instruction's sheet in office.

## 2023-05-03 RX ORDER — ATORVASTATIN CALCIUM 10 MG/1
10 TABLET, FILM COATED ORAL NIGHTLY
Qty: 90 TABLET | Refills: 3 | Status: SHIPPED | OUTPATIENT
Start: 2023-05-03

## 2023-05-09 ENCOUNTER — HOSPITAL ENCOUNTER (OUTPATIENT)
Dept: MAMMOGRAPHY | Facility: HOSPITAL | Age: 63
Discharge: HOME | End: 2023-05-09
Attending: FAMILY MEDICINE
Payer: MEDICAID

## 2023-05-09 ENCOUNTER — HOSPITAL ENCOUNTER (OUTPATIENT)
Dept: ULTRASOUND IMAGING | Facility: HOSPITAL | Age: 63
Discharge: HOME | End: 2023-05-09
Attending: FAMILY MEDICINE
Payer: MEDICAID

## 2023-05-09 DIAGNOSIS — R92.8 ABNORMAL MAMMOGRAM OF LEFT BREAST: Primary | ICD-10-CM

## 2023-05-09 DIAGNOSIS — R92.8 ABNORMAL MAMMOGRAM: ICD-10-CM

## 2023-05-09 PROCEDURE — 77061 BREAST TOMOSYNTHESIS UNI: CPT | Performed by: FAMILY MEDICINE

## 2023-05-09 PROCEDURE — 77065 DX MAMMO INCL CAD UNI: CPT | Performed by: FAMILY MEDICINE

## 2023-05-09 PROCEDURE — 76642 ULTRASOUND BREAST LIMITED: CPT | Performed by: FAMILY MEDICINE

## 2023-05-12 NOTE — DISCHARGE INSTRUCTIONS
The Doctor (Radiologist) who performed your procedure was: DR. Ana Ospina an ice pack over the biopsy site on top of your bra or on top of the ACE wrap (never apply ice directly over skin) for 10-15 minutes of every hour until bedtime for your comfort and to decrease bleeding. Keep your sports bra or the ACE wrap (stereotactic and MRI biopsy) in place for 24 hours after your biopsy. This compression decreases bleeding and breast movement for your comfort. Wear a supportive bra for the next couple of days for comfort (sports bra for sleep). Continue to wear, preferably, a sports bra or good supportive bra for 1 week and take off only to shower. No baths or showers during the first 24 hours after biopsy. After this time you may take a shower. It's okay if the strips get wet but do not soak them. NO saunas, hot tubs or swimming until steri-strips fall off (approx. 5 days). This prevents infection and allows time for them to completely close and heal.  DO NOT remove the steri-strips. They will fall off in 5 days. If any type of irritation (redness, itching or blisters) develops in the area around the steri-strips, remove them gently. If the steri-strips do not fall off after 5 days, gently remove them. Keep the area clean and dry. It is normal to have mild discomfort and bruising at the biopsy site. You may take Tylenol as needed for discomfort, as long as you have no allergies to Tylenol. Do not take aspirin, motrin, ibuprofen or any medication containing NSAID (non-steroidal anti-inflammatory drug) product for 48 hours. DO NOT participate in strenuous activity (aerobics, heavy lifting, housework, gardening, etc.) 48 hours after your biopsy to prevent bleeding. You will receive results in 2-3 business days. If you are having an MRI breast biopsy or an Ultrasound guided breast biopsy, you will be billed for the biopsy and unilateral mammogram separately.   If you have any questions about the procedure or your results, please contact the Breast Care Coordinator Nurse at [phone]. Notify your ordering physician or primary physician for increased bleeding, pain or fever over 100. Or contact a Radiology Nurse at [phone] between 8am-4pm (after 4pm, your call will be directed to the [facility]).

## 2023-05-15 ENCOUNTER — HOSPITAL ENCOUNTER (OUTPATIENT)
Dept: ULTRASOUND IMAGING | Facility: HOSPITAL | Age: 63
Discharge: HOME | End: 2023-05-15
Attending: FAMILY MEDICINE
Payer: MEDICAID

## 2023-05-15 ENCOUNTER — HOSPITAL ENCOUNTER (OUTPATIENT)
Dept: MAMMOGRAPHY | Facility: HOSPITAL | Age: 63
Discharge: HOME | End: 2023-05-15
Attending: FAMILY MEDICINE
Payer: MEDICAID

## 2023-05-15 DIAGNOSIS — N63.20 BREAST MASS, LEFT: ICD-10-CM

## 2023-05-15 DIAGNOSIS — R92.8 ABNORMAL MAMMOGRAM OF LEFT BREAST: ICD-10-CM

## 2023-05-15 PROCEDURE — 19083 BX BREAST 1ST LESION US IMAG: CPT | Performed by: FAMILY MEDICINE

## 2023-05-15 PROCEDURE — 88305 TISSUE EXAM BY PATHOLOGIST: CPT | Performed by: FAMILY MEDICINE

## 2023-05-15 PROCEDURE — 77065 DX MAMMO INCL CAD UNI: CPT | Performed by: FAMILY MEDICINE

## 2023-05-15 NOTE — PROCEDURES
[facility]  Procedure Note    Yi Dale Patient Status:  Outpatient    1960 MRN [id number]   Location [address] Attending Marta Dent MD   Hosp Day # 0 PCP Mitchel Haas MD     Procedure: Left breast ultrasound guided biopsy    Pre-Procedure Diagnosis:  Left breast mass    Post-Procedure Diagnosis: Left breast mass    Anesthesia:  Local    Findings:  Left breast mass    Specimens: multiple cores    Blood Loss:  minimal    Tourniquet Time: none  Complications:  None  Drains:  none        Jennifer Gongora MD  5/15/2023

## 2023-05-17 ENCOUNTER — OFFICE VISIT (OUTPATIENT)
Dept: FAMILY MEDICINE CLINIC | Facility: CLINIC | Age: 63
End: 2023-05-17

## 2023-05-17 VITALS
SYSTOLIC BLOOD PRESSURE: 129 MMHG | DIASTOLIC BLOOD PRESSURE: 81 MMHG | BODY MASS INDEX: 30 KG/M2 | HEART RATE: 67 BPM | WEIGHT: 165 LBS

## 2023-05-17 DIAGNOSIS — E78.49 OTHER HYPERLIPIDEMIA: ICD-10-CM

## 2023-05-17 DIAGNOSIS — E11.9 TYPE 2 DIABETES MELLITUS WITHOUT COMPLICATION, WITHOUT LONG-TERM CURRENT USE OF INSULIN (HCC): Primary | ICD-10-CM

## 2023-05-17 LAB
CARTRIDGE LOT#: 587 NUMERIC
HEMOGLOBIN A1C: 6.3 % (ref 4.3–5.6)

## 2023-05-24 ENCOUNTER — HOSPITAL ENCOUNTER (OUTPATIENT)
Dept: ULTRASOUND IMAGING | Facility: HOSPITAL | Age: 63
Discharge: HOME | End: 2023-05-24
Attending: NURSE PRACTITIONER
Payer: MEDICAID

## 2023-05-24 DIAGNOSIS — K76.0 NAFLD (NONALCOHOLIC FATTY LIVER DISEASE): ICD-10-CM

## 2023-05-24 DIAGNOSIS — R10.11 RUQ PAIN: ICD-10-CM

## 2023-05-24 PROCEDURE — 76981 USE PARENCHYMA: CPT | Performed by: NURSE PRACTITIONER

## 2023-05-24 PROCEDURE — 76705 ECHO EXAM OF ABDOMEN: CPT | Performed by: NURSE PRACTITIONER

## 2023-10-31 ENCOUNTER — HOSPITAL ENCOUNTER (OUTPATIENT)
Facility: HOSPITAL | Age: 63
Setting detail: HOSPITAL OUTPATIENT SURGERY
Discharge: HOME | End: 2023-10-31
Attending: INTERNAL MEDICINE | Admitting: INTERNAL MEDICINE
Payer: MEDICAID

## 2023-10-31 ENCOUNTER — ANESTHESIA (OUTPATIENT)
Dept: ENDOSCOPY | Facility: HOSPITAL | Age: 63
End: 2023-10-31
Payer: MEDICAID

## 2023-10-31 ENCOUNTER — ANESTHESIA EVENT (OUTPATIENT)
Dept: ENDOSCOPY | Facility: HOSPITAL | Age: 63
End: 2023-10-31
Payer: MEDICAID

## 2023-10-31 VITALS
BODY MASS INDEX: 28.17 KG/M2 | RESPIRATION RATE: 14 BRPM | WEIGHT: 165 LBS | DIASTOLIC BLOOD PRESSURE: 74 MMHG | OXYGEN SATURATION: 98 % | HEIGHT: 64 IN | HEART RATE: 56 BPM | SYSTOLIC BLOOD PRESSURE: 125 MMHG

## 2023-10-31 DIAGNOSIS — Z86.010 HX OF COLONIC POLYPS: ICD-10-CM

## 2023-10-31 LAB — GLUCOSE BLDC GLUCOMTR-MCNC: 134 MG/DL (ref 70–99)

## 2023-10-31 PROCEDURE — 0DBN8ZX EXCISION OF SIGMOID COLON, VIA NATURAL OR ARTIFICIAL OPENING ENDOSCOPIC, DIAGNOSTIC: ICD-10-PCS | Performed by: INTERNAL MEDICINE

## 2023-10-31 PROCEDURE — 0DBL8ZX EXCISION OF TRANSVERSE COLON, VIA NATURAL OR ARTIFICIAL OPENING ENDOSCOPIC, DIAGNOSTIC: ICD-10-PCS | Performed by: INTERNAL MEDICINE

## 2023-10-31 PROCEDURE — 45385 COLONOSCOPY W/LESION REMOVAL: CPT | Performed by: INTERNAL MEDICINE

## 2023-10-31 RX ORDER — LIDOCAINE HYDROCHLORIDE 10 MG/ML
INJECTION, SOLUTION EPIDURAL; INFILTRATION; INTRACAUDAL; PERINEURAL AS NEEDED
Status: DISCONTINUED | OUTPATIENT
Start: 2023-10-31 | End: 2023-10-31 | Stop reason: SURG

## 2023-10-31 RX ORDER — SODIUM CHLORIDE, SODIUM LACTATE, POTASSIUM CHLORIDE, CALCIUM CHLORIDE 600; 310; 30; 20 MG/100ML; MG/100ML; MG/100ML; MG/100ML
INJECTION, SOLUTION INTRAVENOUS CONTINUOUS
Status: DISCONTINUED | OUTPATIENT
Start: 2023-10-31 | End: 2023-10-31

## 2023-10-31 RX ADMIN — LIDOCAINE HYDROCHLORIDE 20 MG: 10 INJECTION, SOLUTION EPIDURAL; INFILTRATION; INTRACAUDAL; PERINEURAL at 10:10:00

## 2023-10-31 RX ADMIN — SODIUM CHLORIDE, SODIUM LACTATE, POTASSIUM CHLORIDE, CALCIUM CHLORIDE: 600; 310; 30; 20 INJECTION, SOLUTION INTRAVENOUS at 10:08:00

## 2023-10-31 RX ADMIN — SODIUM CHLORIDE, SODIUM LACTATE, POTASSIUM CHLORIDE, CALCIUM CHLORIDE: 600; 310; 30; 20 INJECTION, SOLUTION INTRAVENOUS at 10:50:00

## 2023-10-31 NOTE — ANESTHESIA POSTPROCEDURE EVALUATION
Patient: Alhaji Bowers    Procedure Summary       Date: 10/31/23 Room / Location: [address] [place] [address] ENDOSCOPY 05 / [address] [place] [address] ENDOSCOPY    Anesthesia Start: 1008 Anesthesia Stop:     Procedure: COLONOSCOPY Diagnosis:       Hx of colonic polyps      (polyps, diverticulosis, hemorrhoids)    Surgeons: Luis Lipscomb MD Anesthesiologist: Eulalio Mckeon CRNA    Anesthesia Type: MAC ASA Status: 2            Anesthesia Type: MAC    Vitals Value Taken Time   /78 10/31/23 1055   Temp 97 10/31/23 1055   Pulse 64 10/31/23 1055   Resp 16 10/31/23 1055   SpO2 100 10/31/23 1055       [address] [place] [address] AN [place] Evaluation:   Patient Evaluated in PACU  Patient Participation: complete - patient participated  Level of Consciousness: awake  Pain Score: 0  Pain Management: adequate  Airway Patency:patent  Dental exam unchanged from preop  Yes    Cardiovascular Status: acceptable  Respiratory Status: acceptable  Postoperative Hydration acceptable      Rick Suarez CRNA  10/31/2023 10:55 AM

## 2023-10-31 NOTE — OPERATIVE REPORT
[facility]    COLONOSCOPY PROCEDURE REPORT     DATE OF PROCEDURE:  10/31/2023     PCP: Detra Mcburney, MD     PREOPERATIVE DIAGNOSIS: History adenomatous colon polyps     POSTOPERATIVE DIAGNOSIS:  See impression. SURGEON:  Christopher A. Janey Koyanagi, M.D.     Fely Barber anesthesia provided by the Anesthesia Service. MAC anesthesia requested due to anticipated intolerance of colonoscopy examination under safe doses conscious sedation medications    COLONOSCOPY PROCEDURE:   After the nature and risks of colonoscopy examination under MAC anesthesia were discussed with the patient and all questions answered, informed consent was obtained. The patient was sedated as above. Digital rectal exam was performed which showed no masses. The Olympus pediatric video colonoscope was placed in the patient's rectum and advanced under direct visualization through the entire length of the colon up to the cecum and terminal ileum. Retroflex exam performed up the ascending colon to the hepatic flexure. The cecum was confirmed by landmarks including appendiceal orifice, cecal trifold, ileocecal valve. Retroflexion was performed in the rectum. The quality of the prep was excellent. Estimated blood loss: none/insignificant       COLONOSCOPY FINDINGS:    Sessile 6-8 mm colon polyp with a mucoid cap identified and removed by cold snare polypectomy, piecemeal polypectomy technique from the proximal transverse colon. Photographs taken. Sessile spherical 6 mm polyp immediately across from the above; also removed from the proximal transverse colon by cold snare polypectomy, suctioned out. Sessile 4 mm polyp removed from the mid sigmoid colon by cold snare polypectomy, suctioned out. Mild descending and sigmoid colon diverticulosis. Small internal hemorrhoids. RECOMMENDATIONS:  High fiber diet. Follow-up above colon polyp pathology results. Repeat colonoscopy examination in 3 - 5 years.   No aspirin or NSAID medications for next 5 days to prevent bleeding

## 2023-10-31 NOTE — DISCHARGE INSTRUCTIONS
.  .  .  Notes from Dr Leonel Grimes:  3 benign colon polyps were found and removed today - to be sent to the lab to be examined - a letter will be sent with results. You may see small quantities of blood with your next 1-2 bowel movements today. If you check your results on [employer], the \"pathology\" report on the colon polyp(s) should be released within the next 24-48 hours. In general, a \"hyperplastic\" colon polyp is completely benign, vs an \"adenoma\" or \"sessile serrated adenoma\" which is considered a benign but precancerous colon polyp. That will be explained in a letter/email from me as well. No aspirin, Excedrin, Advil/Motrin/ibuprofen, Aleve/naproxen for next 5 days (to prevent bleeding.)  Internal hemorrhoids - very common  If you are raw and irritated back there after all of that diarrhea and wiping, three good options to soothe and heal the irritation include Aquaphor ointment, \"Desitin\" or \"A & D\" diaper ointment, or good old-fashioned Vaseline. All of these soothe and create a protective barrier so that your skin can heal.  I recommend repeat colonoscopy exam in 3 - 5 years. .  .  . Home Care Instructions for Colonoscopy  with Sedation    Diet:  - Resume your regular diet as tolerated unless otherwise instructed. - Start with light meals to minimize bloating.  - Do not drink alcohol today. Medication:  - If you have questions about resuming your normal medications, please contact your Primary Care Physician. Activities:  - Take it easy today. Do not return to work today. - Do not drive today. - Do not operate any machinery today (including kitchen equipment).     Colonoscopy:  - You may notice some rectal \"spotting\" (a little blood on the toilet tissue) for a day or two after the exam. This is normal.  - If you experience any rectal bleeding (not spotting), persistent tenderness or sharp severe abdominal pains, oral temperature over 100 degrees Fahrenheit, light-headedness or dizziness, or any other problems, contact your doctor. **If unable to reach your doctor, please go to the [facility] Emergency Room**    - Your referring physician will receive a full report of your examination.  - If you do not hear from your doctor's office within two weeks of your biopsy, please call them for your results. You may be able to see your laboratory results in [facility] between 4 and 7 business days. In some cases, your physician may not have viewed the results before they are released to "[employer]. If you have questions regarding your results contact the physician who ordered the test/exam by phone or via "[employer] by choosing \"Ask a Medical Question. \"

## 2023-10-31 NOTE — H&P
History & Physical Examination    Patient Name: Juwan Friend  MRN: [id number]  CSN: [id number]  YOB: 1960    Diagnosis: History adenomatous colon polyps    Present Illness: 59-year-old healthy woman, BMI 28 with previous history of cholelithiasis and cholecystectomy, multiple small tubular adenoma colon polyps previous colonoscopy examination August 2020. She recently followed up with nurse practitioner Zulema Goodman. She returns for follow-up colonoscopy examination. atorvastatin 10 MG Oral Tab, Take 1 tablet (10 mg total) by mouth nightly., Disp: 90 tablet, Rfl: 3  PEG 3350-KCl-Na Bicarb-NaCl (TRILYTE) 420 g Oral Recon Soln, Take prep as directed by gastro office. May substitute with Trilyte/generic equivalent if needed. , Disp: 4000 mL, Rfl: 0      No current facility-administered medications for this encounter.       Allergies: No Known Allergies    Past Medical History:   Diagnosis Date    Biliary colic     acute    Cholelithiasis     Diabetes ([address]     not taking medicines    Ectopic pregnancy 1995    Migraines      Past Surgical History:   Procedure Laterality Date    BREAST BIOPSY Left 02/18/2021    Left Breast Microductectomy    CHOLECYSTECTOMY      COLONOSCOPY  2009    COLONOSCOPY N/A 08/18/2020    Procedure: COLONOSCOPY;  Surgeon: Tristen Red MD;  Location: [address] [place] [address] ENDOSCOPY    ELECTROCARDIOGRAM, COMPLETE  05/20/2014    scanned to media tab    LAPAROSCOPY W CHOLANGIOGRAM  2014    Laparoscopic cholecystectomy w/ intraoperative cholangiogram    NEEDLE BIOPSY LEFT      UPPER GI ENDOSCOPY,BIOPSY  2015     Family History   Problem Relation Age of Onset    Hypertension Mother     Arthritis Mother         rheumatoid    Psoriasis Mother     Diabetes Father     Diabetes Brother         per NG: \"four brothers have diabetes\"    Cancer Paternal Aunt         lung    Breast Cancer Paternal Cousin Female         45s    Ovarian Cancer Maternal Cousin Female 48     Social History    Tobacco Use      Smoking status: Never      Smokeless tobacco: Never    Alcohol use: No      Alcohol/week: 0.0 standard drinks of alcohol      SYSTEM Check if Review is Normal Check if Physical Exam is Normal If not normal, please explain:   BARRY [ ] Louise.Nydia ]    Vani Reynolds [ ] [ ]    HEART [ X ] [ X ]    [place] [ X ] [ X ]    Mitzy Perry [ X ] [ X ]    Bobby Woodruff [ ] [ ]    EXTREMITIES [ ] [ ]    OTHER        See Anesthesia documentation    [ x ] I have discussed the risks and benefits and alternatives with the patient/family. They understand and agree to proceed with plan of care. [ x ] I have reviewed the History and Physical done within the last 30 days. Any changes noted above.     Jimmy Mcdowell MD  10/31/2023  9:28 AM

## 2023-11-21 ENCOUNTER — LAB ENCOUNTER (OUTPATIENT)
Dept: LAB | Age: 63
End: 2023-11-21
Attending: FAMILY MEDICINE
Payer: MEDICAID

## 2023-11-21 ENCOUNTER — OFFICE VISIT (OUTPATIENT)
Dept: FAMILY MEDICINE CLINIC | Facility: CLINIC | Age: 63
End: 2023-11-21

## 2023-11-21 VITALS
BODY MASS INDEX: 29 KG/M2 | HEART RATE: 71 BPM | SYSTOLIC BLOOD PRESSURE: 131 MMHG | DIASTOLIC BLOOD PRESSURE: 84 MMHG | WEIGHT: 170 LBS

## 2023-11-21 DIAGNOSIS — Z00.00 ROUTINE HEALTH MAINTENANCE: Primary | ICD-10-CM

## 2023-11-21 DIAGNOSIS — R92.8 ABNORMAL MAMMOGRAM: ICD-10-CM

## 2023-11-21 DIAGNOSIS — E11.9 TYPE 2 DIABETES MELLITUS WITHOUT COMPLICATION, WITHOUT LONG-TERM CURRENT USE OF INSULIN (HCC): ICD-10-CM

## 2023-11-21 LAB
ALBUMIN SERPL-MCNC: 4.6 G/DL (ref 3.2–4.8)
ALBUMIN/GLOB SERPL: 1.6 {RATIO} (ref 1–2)
ALP LIVER SERPL-CCNC: 101 U/L
ALT SERPL-CCNC: 28 U/L
ANION GAP SERPL CALC-SCNC: 5 MMOL/L (ref 0–18)
AST SERPL-CCNC: 25 U/L (ref ?–34)
BASOPHILS # BLD AUTO: 0.06 X10(3) UL (ref 0–0.2)
BASOPHILS NFR BLD AUTO: 1 %
BILIRUB SERPL-MCNC: 0.3 MG/DL (ref 0.2–1.1)
BUN BLD-MCNC: 7 MG/DL (ref 9–23)
BUN/CREAT SERPL: 8.3 (ref 10–20)
CALCIUM BLD-MCNC: 10.1 MG/DL (ref 8.7–10.4)
CHLORIDE SERPL-SCNC: 106 MMOL/L (ref 98–112)
CHOLEST SERPL-MCNC: 166 MG/DL (ref ?–200)
CO2 SERPL-SCNC: 26 MMOL/L (ref 21–32)
CREAT BLD-MCNC: 0.84 MG/DL
CREAT UR-SCNC: 16.9 MG/DL
DEPRECATED RDW RBC AUTO: 44.3 FL (ref 35.1–46.3)
EGFRCR SERPLBLD CKD-EPI 2021: 78 ML/MIN/1.73M2 (ref 60–?)
EOSINOPHIL # BLD AUTO: 0.09 X10(3) UL (ref 0–0.7)
EOSINOPHIL NFR BLD AUTO: 1.5 %
ERYTHROCYTE [DISTWIDTH] IN BLOOD BY AUTOMATED COUNT: 13.6 % (ref 11–15)
FASTING PATIENT LIPID ANSWER: NO
FASTING STATUS PATIENT QL REPORTED: NO
GLOBULIN PLAS-MCNC: 2.9 G/DL (ref 2.8–4.4)
GLUCOSE BLD-MCNC: 104 MG/DL (ref 70–99)
HCT VFR BLD AUTO: 43.5 %
HDLC SERPL-MCNC: 55 MG/DL (ref 40–59)
HGB BLD-MCNC: 14.5 G/DL
IMM GRANULOCYTES # BLD AUTO: 0.02 X10(3) UL (ref 0–1)
IMM GRANULOCYTES NFR BLD: 0.3 %
LDLC SERPL CALC-MCNC: 92 MG/DL (ref ?–100)
LYMPHOCYTES # BLD AUTO: 2.15 X10(3) UL (ref 1–4)
LYMPHOCYTES NFR BLD AUTO: 36.4 %
MCH RBC QN AUTO: 29.7 PG (ref 26–34)
MCHC RBC AUTO-ENTMCNC: 33.3 G/DL (ref 31–37)
MCV RBC AUTO: 89.1 FL
MICROALBUMIN UR-MCNC: <0.3 MG/DL
MONOCYTES # BLD AUTO: 0.65 X10(3) UL (ref 0.1–1)
MONOCYTES NFR BLD AUTO: 11 %
NEUTROPHILS # BLD AUTO: 2.93 X10 (3) UL (ref 1.5–7.7)
NEUTROPHILS # BLD AUTO: 2.93 X10(3) UL (ref 1.5–7.7)
NEUTROPHILS NFR BLD AUTO: 49.8 %
NONHDLC SERPL-MCNC: 111 MG/DL (ref ?–130)
OSMOLALITY SERPL CALC.SUM OF ELEC: 282 MOSM/KG (ref 275–295)
PLATELET # BLD AUTO: 282 10(3)UL (ref 150–450)
POTASSIUM SERPL-SCNC: 4.2 MMOL/L (ref 3.5–5.1)
PROT SERPL-MCNC: 7.5 G/DL (ref 5.7–8.2)
RBC # BLD AUTO: 4.88 X10(6)UL
SODIUM SERPL-SCNC: 137 MMOL/L (ref 136–145)
TRIGL SERPL-MCNC: 102 MG/DL (ref 30–149)
VLDLC SERPL CALC-MCNC: 17 MG/DL (ref 0–30)
WBC # BLD AUTO: 5.9 X10(3) UL (ref 4–11)

## 2023-11-21 PROCEDURE — 99396 PREV VISIT EST AGE 40-64: CPT | Performed by: FAMILY MEDICINE

## 2023-11-21 PROCEDURE — 82043 UR ALBUMIN QUANTITATIVE: CPT

## 2023-11-21 PROCEDURE — 36415 COLL VENOUS BLD VENIPUNCTURE: CPT | Performed by: FAMILY MEDICINE

## 2023-11-21 PROCEDURE — 80061 LIPID PANEL: CPT | Performed by: FAMILY MEDICINE

## 2023-11-21 PROCEDURE — 85025 COMPLETE CBC W/AUTO DIFF WBC: CPT | Performed by: FAMILY MEDICINE

## 2023-11-21 PROCEDURE — 3079F DIAST BP 80-89 MM HG: CPT | Performed by: FAMILY MEDICINE

## 2023-11-21 PROCEDURE — 3075F SYST BP GE 130 - 139MM HG: CPT | Performed by: FAMILY MEDICINE

## 2023-11-21 PROCEDURE — 82570 ASSAY OF URINE CREATININE: CPT

## 2023-11-21 PROCEDURE — 80053 COMPREHEN METABOLIC PANEL: CPT | Performed by: FAMILY MEDICINE

## 2023-11-21 RX ORDER — METFORMIN HYDROCHLORIDE 500 MG/1
500 TABLET, EXTENDED RELEASE ORAL DAILY
Qty: 90 TABLET | Refills: 3 | Status: SHIPPED | OUTPATIENT
Start: 2023-11-21

## 2023-11-27 ENCOUNTER — TELEPHONE (OUTPATIENT)
Facility: CLINIC | Age: 63
End: 2023-11-27

## 2023-11-27 NOTE — TELEPHONE ENCOUNTER
Results letter mailed to patient per   Colon recall entered for repeat in 3 yrs,10/31/2026  Colon done in 10/31/2023  HM Updated and Patient Outreach was placed for Colon recall   Malena Cornejo MD  P [facility] Clinical Staff  GI RNs - 1.  Please print and mail this letter to patient; 2. Recall for colonoscopy exam in 3 years

## 2023-12-13 ENCOUNTER — HOSPITAL ENCOUNTER (OUTPATIENT)
Dept: ULTRASOUND IMAGING | Facility: HOSPITAL | Age: 63
Discharge: HOME | End: 2023-12-13
Attending: FAMILY MEDICINE
Payer: MEDICAID

## 2023-12-13 ENCOUNTER — HOSPITAL ENCOUNTER (OUTPATIENT)
Dept: MAMMOGRAPHY | Facility: HOSPITAL | Age: 63
Discharge: HOME | End: 2023-12-13
Attending: FAMILY MEDICINE
Payer: MEDICAID

## 2023-12-13 DIAGNOSIS — R92.8 ABNORMAL MAMMOGRAM: ICD-10-CM

## 2023-12-13 PROCEDURE — 76642 ULTRASOUND BREAST LIMITED: CPT | Performed by: FAMILY MEDICINE

## 2023-12-13 PROCEDURE — 77061 BREAST TOMOSYNTHESIS UNI: CPT | Performed by: FAMILY MEDICINE

## 2023-12-13 PROCEDURE — 77065 DX MAMMO INCL CAD UNI: CPT | Performed by: FAMILY MEDICINE

## 2024-05-01 ENCOUNTER — OFFICE VISIT (OUTPATIENT)
Dept: FAMILY MEDICINE CLINIC | Facility: CLINIC | Age: 64
End: 2024-05-01

## 2024-05-01 VITALS
HEART RATE: 73 BPM | WEIGHT: 166 LBS | BODY MASS INDEX: 28 KG/M2 | DIASTOLIC BLOOD PRESSURE: 79 MMHG | SYSTOLIC BLOOD PRESSURE: 120 MMHG

## 2024-05-01 DIAGNOSIS — R92.8 ABNORMAL MAMMOGRAM: ICD-10-CM

## 2024-05-01 DIAGNOSIS — E28.39 ESTROGEN DEFICIENCY: ICD-10-CM

## 2024-05-01 DIAGNOSIS — M25.552 LEFT HIP PAIN: ICD-10-CM

## 2024-05-01 DIAGNOSIS — E11.9 TYPE 2 DIABETES MELLITUS WITHOUT COMPLICATION, WITHOUT LONG-TERM CURRENT USE OF INSULIN (HCC): Primary | ICD-10-CM

## 2024-05-01 LAB — HEMOGLOBIN A1C: 7 % (ref 4.3–5.6)

## 2024-05-01 PROCEDURE — 99215 OFFICE O/P EST HI 40 MIN: CPT | Performed by: FAMILY MEDICINE

## 2024-05-01 PROCEDURE — 83036 HEMOGLOBIN GLYCOSYLATED A1C: CPT | Performed by: FAMILY MEDICINE

## 2024-05-01 NOTE — PROGRESS NOTES
Hermila Solorio is a 64 year old female.  Chief Complaint   Patient presents with    Hip Pain     C/o left hip pain, pain has been getting worse the past month.    Diabetes     6 month f/u       HPI:   Patient is a 64-year-old female presents today for follow-up diabetes hemoglobin A1c is 7.0 today.  Patient also complaining of left hip pain.  Is been occurring for more than 1 year.  Getting worse at this time.  Sometimes wakes her from sleep when she rolls over on it.  Patient also needs a DEXA scan.  Patient gets a mammogram and ultrasound every 6 months.  That needs to be ordered today and done in June.  Patient is due for an eye exam with her eye doctor end of May we will get the report and give her referral today.    Current Outpatient Medications   Medication Sig Dispense Refill    metFORMIN  MG Oral Tablet 24 Hr Take 1 tablet (500 mg total) by mouth daily. 90 tablet 3    atorvastatin 10 MG Oral Tab Take 1 tablet (10 mg total) by mouth nightly. (Patient not taking: Reported on 10/31/2023) 90 tablet 3      Past Medical History:    Biliary colic    acute    Cholelithiasis    Diabetes (HCC)    not taking medicines    Ectopic pregnancy (HCC)    Migraines      Past Surgical History:   Procedure Laterality Date    Breast biopsy Left 02/18/2021    Left Breast Microductectomy    Cholecystectomy      Colonoscopy  2009    Colonoscopy N/A 08/18/2020    Procedure: COLONOSCOPY;  Surgeon: Desmond Valdez MD;  Location: [facility] ENDOSCOPY    Colonoscopy N/A 10/31/2023    Procedure: COLONOSCOPY;  Surgeon: Desmond Valdez MD;  Location: [facility] ENDOSCOPY    Electrocardiogram, complete  05/20/2014    scanned to media tab    Laparoscopy w cholangiogram  2014    Laparoscopic cholecystectomy w/ intraoperative cholangiogram    Needle biopsy left      Upper gi endoscopy,biopsy  2015      Social History:  Social History     Socioeconomic History    Marital status:    Tobacco Use    Smoking status: Never     Smokeless tobacco: Never   Vaping Use    Vaping status: Never Used   Substance and Sexual Activity    Alcohol use: No     Alcohol/week: 0.0 standard drinks of alcohol    Drug use: No    Sexual activity: Yes   Other Topics Concern    Caffeine Concern Yes     Comment: coffee, 24 oz./day    Exercise Yes     Comment: walk 2 times daily        REVIEW OF SYSTEMS:   GENERAL HEALTH: No fevers, chills, sweats, fatigue  VISION: No recent vision problems, blurry vision or double vision  HEENT: No decreased hearing ear pain nasal congestion or sore throat  SKIN: denies any unusual skin lesions or rashes  RESPIRATORY: denies shortness of breath, cough, wheezing  CARDIOVASCULAR: denies chest pain on exertion, palpitations, swelling in feet  GI: denies abdominal pain and denies heartburn, nausea or vomiting  : No Pain on urination, change in the color of urine, discharge, urinating frequently  MUS: No back pain, joint pain, muscle pain  NEURO: denies headaches , anxiety, depression    EXAM:   /79 (BP Location: Left arm, Patient Position: Sitting, Cuff Size: adult)   Pulse 73   Wt 166 lb (75.3 kg)   BMI 28.49 kg/m²   GENERAL: well developed, well nourished,in no apparent distress      LUNGS: clear to auscultation, no wheeze  CARDIO: RRR without murmur        ASSESSMENT AND PLAN:   1. Type 2 diabetes mellitus without complication, without long-term current use of insulin ([facility])  Hemoglobin A1c 7.0.  Discussed diet and exercise.  Patient given referral to Dr. Ortega for ophthalmology due in end of May.  - POC Glycohemoglobin [41208]  - Ophthalmology Referral - External    2. Estrogen deficiency  Referral for DEXA scan given  - XR DEXA BONE DENSITOMETRY (CPT=77080); Future    3. Left hip pain  Discussed possible etiology of hip pain we will start by getting an x-ray.  May need referral to Ortho.  - XR HIP + PELVIS MIN 4 VIEWS LEFT (CPT=73503); Future    4. Abnormal mammogram  Reviewed past mammograms.  Patient due for a  another diagnostic left-sided mammogram with ultrasound and possible additional views.  Referred today.  - [facility] VINEET 2D+3D DIAGNOSTIC ADDL VWS LEFT (JGQ=16100/32996); Future  I spent 40 minutes with patient both reviewing her mammogram, reviewing diabetic results, discussing hip pain, making a therapeutic plan doing a brief physical exam and documentation    The patient indicates understanding of these issues and agrees to the plan.  No follow-ups on file.Hermila Solorio is a 64 year old female.  Chief Complaint   Patient presents with    Hip Pain     C/o left hip pain, pain has been getting worse the past month.    Diabetes     6 month f/u       HPI:   See above note    Current Outpatient Medications   Medication Sig Dispense Refill    metFORMIN  MG Oral Tablet 24 Hr Take 1 tablet (500 mg total) by mouth daily. 90 tablet 3    atorvastatin 10 MG Oral Tab Take 1 tablet (10 mg total) by mouth nightly. (Patient not taking: Reported on 10/31/2023) 90 tablet 3      Past Medical History:    Biliary colic    acute    Cholelithiasis    Diabetes (HCC)    not taking medicines    Ectopic pregnancy (HCC)    Migraines      Past Surgical History:   Procedure Laterality Date    Breast biopsy Left 02/18/2021    Left Breast Microductectomy    Cholecystectomy      Colonoscopy  2009    Colonoscopy N/A 08/18/2020    Procedure: COLONOSCOPY;  Surgeon: Desmond Valdez MD;  Location: [facility] ENDOSCOPY    Colonoscopy N/A 10/31/2023    Procedure: COLONOSCOPY;  Surgeon: Desmond Valdez MD;  Location: [facility] ENDOSCOPY    Electrocardiogram, complete  05/20/2014    scanned to media tab    Laparoscopy w cholangiogram  2014    Laparoscopic cholecystectomy w/ intraoperative cholangiogram    Needle biopsy left      Upper gi endoscopy,biopsy  2015      Social History:  Social History     Socioeconomic History    Marital status:    Tobacco Use    Smoking status: Never    Smokeless tobacco: Never   Vaping Use    Vaping status:  Never Used   Substance and Sexual Activity    Alcohol use: No     Alcohol/week: 0.0 standard drinks of alcohol    Drug use: No    Sexual activity: Yes   Other Topics Concern    Caffeine Concern Yes     Comment: coffee, 24 oz./day    Exercise Yes     Comment: walk 2 times daily        REVIEW OF SYSTEMS:   GENERAL HEALTH: No fevers, chills, sweats, fatigue  VISION: No recent vision problems, blurry vision or double vision  HEENT: No decreased hearing ear pain nasal congestion or sore throat  SKIN: denies any unusual skin lesions or rashes  RESPIRATORY: denies shortness of breath, cough, wheezing  CARDIOVASCULAR: denies chest pain on exertion, palpitations, swelling in feet  GI: denies abdominal pain and denies heartburn, nausea or vomiting  : No Pain on urination, change in the color of urine, discharge, urinating frequently  MUS: No back pain, joint pain, muscle pain  NEURO: denies headaches , anxiety, depression    EXAM:   /79 (BP Location: Left arm, Patient Position: Sitting, Cuff Size: adult)   Pulse 73   Wt 166 lb (75.3 kg)   BMI 28.49 kg/m²   GENERAL: well developed, well nourished,in no apparent distress  SKIN: no rashes,no suspicious lesions  HEENT: atraumatic, normocephalic,ears and throat are clear,   NECK: supple,no adenopathy,  LUNGS: clear to auscultation, no wheeze  CARDIO: RRR without murmur  GI: good BS's,no masses or tenderness  EXTREMITIES: no cyanosis, or edema  Bilateral barefoot skin diabetic exam is normal, visualized feet and the appearance is normal.  Bilateral monofilament/sensation of both feet is normal.  Pulsation pedal pulse exam of both lower legs/feet is normal as well.      ASSESSMENT AND PLAN:   There are no diagnoses linked to this encounter.     The patient indicates understanding of these issues and agrees to the plan.  No follow-ups on file.

## 2024-05-06 ENCOUNTER — HOSPITAL ENCOUNTER (OUTPATIENT)
Dept: GENERAL RADIOLOGY | Facility: HOSPITAL | Age: 64
Discharge: HOME | End: 2024-05-06
Attending: FAMILY MEDICINE
Payer: MEDICARE

## 2024-05-06 DIAGNOSIS — M25.552 LEFT HIP PAIN: ICD-10-CM

## 2024-05-06 PROCEDURE — 73502 X-RAY EXAM HIP UNI 2-3 VIEWS: CPT | Performed by: FAMILY MEDICINE

## 2024-05-12 DIAGNOSIS — M25.552 LEFT HIP PAIN: Primary | ICD-10-CM

## 2024-05-20 ENCOUNTER — OFFICE VISIT (OUTPATIENT)
Dept: FAMILY MEDICINE CLINIC | Facility: CLINIC | Age: 64
End: 2024-05-20

## 2024-05-20 VITALS
BODY MASS INDEX: 28.85 KG/M2 | HEIGHT: 64 IN | HEART RATE: 71 BPM | SYSTOLIC BLOOD PRESSURE: 132 MMHG | RESPIRATION RATE: 15 BRPM | OXYGEN SATURATION: 98 % | WEIGHT: 169 LBS | DIASTOLIC BLOOD PRESSURE: 82 MMHG

## 2024-05-20 DIAGNOSIS — L29.9 ITCHY SCALP: Primary | ICD-10-CM

## 2024-05-20 DIAGNOSIS — E11.9 TYPE 2 DIABETES MELLITUS WITHOUT COMPLICATION, WITHOUT LONG-TERM CURRENT USE OF INSULIN (HCC): ICD-10-CM

## 2024-05-20 PROCEDURE — 99214 OFFICE O/P EST MOD 30 MIN: CPT | Performed by: FAMILY MEDICINE

## 2024-05-20 RX ORDER — FLUOCINOLONE ACETONIDE 0.11 MG/ML
OIL TOPICAL
Qty: 118 ML | Refills: 0 | Status: SHIPPED | OUTPATIENT
Start: 2024-05-20

## 2024-05-20 NOTE — PROGRESS NOTES
HPI:    Hermila Solorio is a 64 year old female presents clinic for follow-up regarding itchy scalp.  At last visit with her PCP, patient was advised dandruff shampoo like Ronnell Mahan.  Patient states that this has not helped, feels her scalp is dry.  Not flaky.  Denies lesions, drainage.      HISTORY:  Past Medical History:    Biliary colic    acute    Cholelithiasis    Diabetes (HCC)    not taking medicines    Ectopic pregnancy (HCC)    Migraines      Past Surgical History:   Procedure Laterality Date    Breast biopsy Left 02/18/2021    Left Breast Microductectomy    Cholecystectomy      Colonoscopy  2009    Colonoscopy N/A 08/18/2020    Procedure: COLONOSCOPY;  Surgeon: Desmond Valdez MD;  Location: [facility] ENDOSCOPY    Colonoscopy N/A 10/31/2023    Procedure: COLONOSCOPY;  Surgeon: Desmond Valdez MD;  Location: [facility] ENDOSCOPY    Electrocardiogram, complete  05/20/2014    scanned to media tab    Laparoscopy w cholangiogram  2014    Laparoscopic cholecystectomy w/ intraoperative cholangiogram    Needle biopsy left      Upper gi endoscopy,biopsy  2015      Family History   Problem Relation Age of Onset    Hypertension Mother     Arthritis Mother         rheumatoid    Psoriasis Mother     Diabetes Father     Diabetes Brother         per NG: \"four brothers have diabetes\"    Cancer Paternal Aunt         lung    Breast Cancer Paternal Cousin Female         40s    Ovarian Cancer Maternal Cousin Female 50      Social History:   Social History     Socioeconomic History    Marital status:    Tobacco Use    Smoking status: Never    Smokeless tobacco: Never   Vaping Use    Vaping status: Never Used   Substance and Sexual Activity    Alcohol use: No     Alcohol/week: 0.0 standard drinks of alcohol    Drug use: No    Sexual activity: Yes   Other Topics Concern    Caffeine Concern Yes     Comment: coffee, 24 oz./day    Exercise Yes     Comment: walk 2 times daily        Medications (Active prior  to today's visit):  Current Outpatient Medications   Medication Sig Dispense Refill    Fluocinolone Acetonide Scalp 0.01 % External Oil Use twice weekly, apply to affected areas and leave on scalp for at least 6 hours before rinsing. 118 mL 0    metFORMIN  MG Oral Tablet 24 Hr Take 1 tablet (500 mg total) by mouth daily. 90 tablet 3    atorvastatin 10 MG Oral Tab Take 1 tablet (10 mg total) by mouth nightly. 90 tablet 3       Allergies:  No Known Allergies      Depression Screening (PHQ-2/PHQ-9): Over the LAST 2 WEEKS                         ROS:   Review of Systems   All other systems reviewed and are negative.      PHYSICAL EXAM:     Vitals:    05/20/24 0955   BP: 132/82   BP Location: Left arm   Patient Position: Sitting   Cuff Size: adult   Pulse: 71   Resp: 15   SpO2: 98%   Weight: 169 lb (76.7 kg)   Height: 5' 4\" (1.626 m)     Physical Exam  Vitals reviewed.   Constitutional:       General: She is not in acute distress.  Cardiovascular:      Rate and Rhythm: Normal rate.   Pulmonary:      Effort: Pulmonary effort is normal. No respiratory distress.   Neurological:      Mental Status: She is alert.   Psychiatric:         Mood and Affect: Mood normal.         ASSESSMENT/PLAN:   (L29.9) Itchy scalp  (primary encounter diagnosis)  Plan:   -No flaking noted on exam.  Patient does have patches of inflammation/erythema.  Fluocinolone scalp oil to pharmacy, to use twice weekly.  To apply over affected areas, leave on for at least 6 hours before washing out of hair.  If symptoms do not improve in 3 to 4 weeks, can consider dermatology consult.    (E11.9) Type 2 diabetes mellitus without complication, without long-term current use of insulin ([facility])  Plan: Diabetic Retinopathy Exam  OU - Both Eyes  -Diabetic diet encouraged.  Continue current management with metformin.  Retinal exam ordered.  Will await results.               Responsible party/patient verbalized understanding of information discussed. No barriers to  learning observed.            Orders This Visit:  Orders Placed This Encounter   Procedures    Diabetic Retinopathy Exam  OU - Both Eyes       Meds This Visit:  Requested Prescriptions     Signed Prescriptions Disp Refills    Fluocinolone Acetonide Scalp 0.01 % External Oil 118 mL 0     Sig: Use twice weekly, apply to affected areas and leave on scalp for at least 6 hours before rinsing.       Imaging & Referrals:  None     Chaperone offered at visit today.     The 21st Century cures Act makes medical notes like these available to patients in the interest of transparency.  However, be advised that this is a medical document.  It is intended as peer to peer communication.  It is written in medical language and may contain abbreviations or verbiage that are unfamiliar.  It may appear blunt or direct.  Medical documents are intended to carry relevant information, facts as evident, and the clinical opinion of the practitioner.      This note was created by [employer] voice recognition. Errors in content may be related to improper recognition by the system; efforts to review and correct have been done but errors may still exist. Please contact me with any questions.       5/20/2024  Crescencio Yan MD

## 2024-05-28 NOTE — MR AVS SNAPSHOT
[facility]  [address]  [phone]               Thank you for choosing us for your health care visit with Lee Ann Lux MD.  We are glad to serve you and happy to provide you with this summary schedule your appointment. If you are confident that your benefit plan will not require a referral or authorization, such as [place] Medicaid, please feel free to schedule your appointment immediately.  However, if you are unsure about the requirements Warfarin Hold 5 days prior to procedure. Last dose 06/12/2024.  INR on DOS

## 2024-05-30 ENCOUNTER — TELEPHONE (OUTPATIENT)
Dept: FAMILY MEDICINE CLINIC | Facility: CLINIC | Age: 64
End: 2024-05-30

## 2024-05-30 DIAGNOSIS — R92.8 ABNORMAL MAMMOGRAM: ICD-10-CM

## 2024-05-30 DIAGNOSIS — Z12.31 BREAST CANCER SCREENING BY MAMMOGRAM: Primary | ICD-10-CM

## 2024-05-30 NOTE — TELEPHONE ENCOUNTER
Cyn from radiology called to request an order for diagnostic bi lateral mammogram with ultrasound to follow if needed for 6 month follow up

## 2024-06-03 ENCOUNTER — HOSPITAL ENCOUNTER (OUTPATIENT)
Dept: BONE DENSITY | Facility: HOSPITAL | Age: 64
Discharge: HOME | End: 2024-06-03
Attending: FAMILY MEDICINE
Payer: MEDICARE

## 2024-06-03 DIAGNOSIS — E28.39 ESTROGEN DEFICIENCY: ICD-10-CM

## 2024-06-03 PROCEDURE — 77080 DXA BONE DENSITY AXIAL: CPT | Performed by: FAMILY MEDICINE

## 2024-06-18 ENCOUNTER — HOSPITAL ENCOUNTER (OUTPATIENT)
Dept: MAMMOGRAPHY | Facility: HOSPITAL | Age: 64
Discharge: HOME | End: 2024-06-18
Attending: FAMILY MEDICINE

## 2024-06-18 ENCOUNTER — HOSPITAL ENCOUNTER (OUTPATIENT)
Dept: ULTRASOUND IMAGING | Facility: HOSPITAL | Age: 64
Discharge: HOME | End: 2024-06-18
Attending: FAMILY MEDICINE

## 2024-06-18 DIAGNOSIS — R92.8 ABNORMAL MAMMOGRAM: ICD-10-CM

## 2024-06-18 PROCEDURE — 76642 ULTRASOUND BREAST LIMITED: CPT | Performed by: FAMILY MEDICINE

## 2024-06-18 PROCEDURE — 77066 DX MAMMO INCL CAD BI: CPT | Performed by: FAMILY MEDICINE

## 2024-06-18 PROCEDURE — 77062 BREAST TOMOSYNTHESIS BI: CPT | Performed by: FAMILY MEDICINE

## 2024-07-24 ENCOUNTER — OFFICE VISIT (OUTPATIENT)
Dept: FAMILY MEDICINE CLINIC | Facility: CLINIC | Age: 64
End: 2024-07-24

## 2024-07-24 VITALS
HEIGHT: 64 IN | HEART RATE: 73 BPM | SYSTOLIC BLOOD PRESSURE: 130 MMHG | BODY MASS INDEX: 28.85 KG/M2 | DIASTOLIC BLOOD PRESSURE: 72 MMHG | WEIGHT: 169 LBS

## 2024-07-24 DIAGNOSIS — N63.20 MASS OF LEFT BREAST, UNSPECIFIED QUADRANT: ICD-10-CM

## 2024-07-24 DIAGNOSIS — Z00.00 ENCOUNTER FOR ANNUAL HEALTH EXAMINATION: ICD-10-CM

## 2024-07-24 DIAGNOSIS — E11.9 TYPE 2 DIABETES MELLITUS WITHOUT COMPLICATION, WITHOUT LONG-TERM CURRENT USE OF INSULIN (HCC): Primary | ICD-10-CM

## 2024-07-24 PROCEDURE — 99214 OFFICE O/P EST MOD 30 MIN: CPT | Performed by: FAMILY MEDICINE

## 2024-07-24 PROCEDURE — G0402 INITIAL PREVENTIVE EXAM: HCPCS | Performed by: FAMILY MEDICINE

## 2024-07-24 RX ORDER — FEXOFENADINE HCL 180 MG/1
180 TABLET ORAL DAILY
Qty: 90 TABLET | Refills: 3 | Status: SHIPPED | OUTPATIENT
Start: 2024-07-24

## 2024-07-24 NOTE — PROGRESS NOTES
Subjective:   Hermila Solorio is a 64 year old female who presents for a Medicare Initial Preventative Physical Exam (Welcome to Medicare- < 12 months on Medicare) and scheduled follow up of multiple significant but stable problems.   Patient is a 64-year-old female presents today for her initial Medicare visit.  Patient has type 2 diabetes.  Patient's hemoglobin A1c last checked in May 2 months ago was 7.0.  Patient sees her eye doctor every 6 months needs to have a diabetic eye exam.  Patient had lab work done last November.  Will need lab work done again in December.    History/Other:   Fall Risk Assessment:   She has been screened for Falls and is High Risk. Fall Prevention information provided to patient in After Visit Summary.    Do you feel unsteady when standing or walking?: Yes  Do you worry about falling?: No  Have you fallen in the past year?: No     Cognitive Assessment:   She had a completely normal cognitive assessment - see flowsheet entries     Functional Ability/Status:   Hermila Solorio has some abnormal functions as listed below:  She has Vision problems based on screening of functional status.       Depression Screening (PHQ):  PHQ-2 SCORE: 1  , done 7/24/2024   Feeling down, depressed, or hopeless: 1              Advanced Directives:   She does NOT have a Living Will. [Do you have a living will?: No]  She does NOT have a Power of  for Health Care. [Do you have a healthcare power of ?: No]  Madeline discussed      Patient Active Problem List   Diagnosis    Uterine leiomyoma    Nonalcoholic steatohepatitis ([facility])    Urinary frequency    Prolapse of lumbar intervertebral disc without radiculopathy    Type 2 diabetes mellitus without complication, without long-term current use of insulin (HCC)    Palpitation    Bloody discharge from left nipple     Allergies:  She has No Known Allergies.    Current Medications:  Outpatient Medications Marked as Taking for the 7/24/24 encounter (Office  Visit) with Chikis Rosas MD   Medication Sig    fexofenadine 180 MG Oral Tab Take 1 tablet (180 mg total) by mouth daily.    Fluocinolone Acetonide Scalp 0.01 % External Oil Use twice weekly, apply to affected areas and leave on scalp for at least 6 hours before rinsing.    metFORMIN  MG Oral Tablet 24 Hr Take 1 tablet (500 mg total) by mouth daily.    atorvastatin 10 MG Oral Tab Take 1 tablet (10 mg total) by mouth nightly.       Medical History:  She  has a past medical history of Biliary colic, Cholelithiasis, Diabetes (HCC), Ectopic pregnancy (HCC) (1995), and Migraines.  Surgical History:  She  has a past surgical history that includes cholecystectomy; laparoscopy w cholangiogram (2014); electrocardiogram, complete (05/20/2014); colonoscopy (2009); upper gi endoscopy,biopsy (2015); colonoscopy (N/A, 08/18/2020); Breast biopsy (Left, 02/18/2021); needle biopsy left; and colonoscopy (N/A, 10/31/2023).   Family History:  Her family history includes Arthritis in her mother; Breast Cancer in her paternal cousin female; Cancer in her paternal aunt; Diabetes in her brother and father; Hypertension in her mother; Ovarian Cancer (age of onset: 50) in her maternal cousin female; Psoriasis in her mother.  Social History:  She  reports that she has never smoked. She has never used smokeless tobacco. She reports that she does not drink alcohol and does not use drugs.    Tobacco:  She has never smoked tobacco.    CAGE Alcohol Screen:   CAGE screening score of 0 on 7/24/2024, showing low risk of alcohol abuse.      Patient Care Team:  Chikis Rosas MD as PCP - General (Family Medicine)  Demond Hopkins DO (Physical Medicine)    Review of Systems  GENERAL: feels well otherwise  SKIN: denies any unusual skin lesions  EYES: denies blurred vision or double vision  HEENT: denies nasal congestion, sinus pain or ST  LUNGS: denies shortness of breath with exertion  CARDIOVASCULAR: denies chest pain on exertion  GI:  denies abdominal pain, denies heartburn  : denies dysuria, vaginal discharge or itching, no complaint of urinary incontinence   MUSCULOSKELETAL: denies back pain  NEURO: denies headaches  PSYCHE: denies depression or anxiety  HEMATOLOGIC: denies hx of anemia  ENDOCRINE: denies thyroid history  ALL/ASTHMA: denies hx of allergy or asthma    Objective:   Physical Exam  General Appearance:  Alert, cooperative, no distress, appears stated age   Head:  Normocephalic, without obvious abnormality, atraumatic   Eyes:  PERRL, conjunctiva/corneas clear, EOM's intact both eyes   Ears:  Normal TM's and external ear canals, both ears   Nose: Nares normal, septum midline,mucosa normal, no drainage or sinus tenderness   Throat: Lips, mucosa, and tongue normal; teeth and gums normal   Neck: Supple, symmetrical, trachea midline, no adenopathy;  thyroid: not enlarged, symmetric, no tenderness/mass/nodules; no carotid bruit or JVD   Back:   Symmetric, no curvature, ROM normal, no CVA tenderness   Lungs:   Clear to auscultation bilaterally, respirations unlabored   Heart:  Regular rate and rhythm, S1 and S2 normal, no murmur, rub, or gallop   Abdomen:   Soft, non-tender, bowel sounds active all four quadrants,  no masses, no organomegaly   Pelvic: Deferred   Extremities: Extremities normal, atraumatic, no cyanosis or edema   Pulses: 2+ and symmetric   Skin: Skin color, texture, turgor normal, no rashes or lesions   Lymph nodes: Cervical, supraclavicular, and axillary nodes normal   Neurologic: Normal       /72 (BP Location: Right arm, Patient Position: Sitting, Cuff Size: adult)   Pulse 73   Ht 5' 4\" (1.626 m)   Wt 169 lb (76.7 kg)   BMI 29.01 kg/m²  Estimated body mass index is 29.01 kg/m² as calculated from the following:    Height as of this encounter: 5' 4\" (1.626 m).    Weight as of this encounter: 169 lb (76.7 kg).    Medicare Hearing Assessment:   Hearing Screening    Time taken: 7/24/2024 10:45 AM  Entry User:  Lisa Vega [facility]  Screening Method: Finger Rub  Finger Rub Result: Pass         Visual Acuity:   Right Eye Visual Acuity: Uncorrected Right Eye Chart Acuity: 20/30   Left Eye Visual Acuity: Uncorrected Left Eye Chart Acuity: 20/30   Both Eyes Visual Acuity: Uncorrected Both Eyes Chart Acuity: 20/25   Able To Tolerate Visual Acuity: Yes        Assessment & Plan:   Hermila Solorio is a 64 year old female who presents for a Medicare Assessment.     1. Type 2 diabetes mellitus without complication, without long-term current use of insulin ([facility]) (Primary)  Overview:    Patient's hemoglobin A1c is stable at 7.0 continue present management.  Patient is going to make an appointment with her eye doctor she sees him every 6 months.  Discussed diet and exercise with patient.  Discussed medication compliance.  Orders:  -     Cancel: Diabetic Retinopathy Exam; Future; Expected date: 07/24/2024  -     Detailed, Mod Complex (24103)  2. Encounter for annual health examination  Patient had labs done just over 6 months ago we will repeat again in December.  3. Mass of left breast, unspecified quadrant  Patient gets a diagnostic mammogram every 6 months.  Order given-     ANÍBAL VINEET 2D+3D DIAGNOSTIC ANÍBAL LEFT (CPT=77065/02463); Future; Expected date: 07/24/2024  Other orders  -     Fexofenadine HCl; Take 1 tablet (180 mg total) by mouth daily.  Dispense: 90 tablet; Refill: 3    The patient indicates understanding of these issues and agrees to the plan.  Reinforced healthy diet, lifestyle, and exercise.      Return in 6 months (on 1/24/2025).     Chikis Rosas MD, 7/24/2024     Supplementary Documentation:   General Health:  In the past six months, have you lost more than 10 pounds without trying?: 2 - No  Has your appetite been poor?: No  Type of Diet: Balanced  How does the patient maintain a good energy level?: Daily Walks  How would you describe your daily physical activity?: Moderate  How would you describe your current  health state?: Fair  How do you maintain positive mental well-being?: Visiting Family  On a scale of 0 to 10, with 0 being no pain and 10 being severe pain, what is your pain level?: 0 - (None)  At any time do you feel concerned for the safety/well-being of yourself and/or your children, in your home or elsewhere?: Yes  Have you had any immunizations at another office such as Influenza, Hepatitis B, Tetanus, or Pneumococcal?: No    Health Maintenance   Topic Date Due    Zoster Vaccines (2 of 2) 09/30/2020    COVID-19 Vaccine (3 - 2023-24 season) 09/01/2023    Diabetes Care Dilated Eye Exam  05/23/2024    Influenza Vaccine (1) 10/01/2024    Diabetes Care A1C  11/01/2024    Annual Physical  11/21/2024    Diabetes Care: GFR  11/21/2024    Diabetes Care: Microalb/Creat Ratio  11/21/2024    Mammogram  12/18/2024    Diabetes Care Foot Exam  05/01/2025    Pap Smear  06/29/2026    Colorectal Cancer Screening  10/31/2026    DEXA Scan  Completed    Annual Depression Screening  Completed    Fall Risk Screening (Annual)  Completed    Pneumococcal Vaccine: 65+ Years  Completed

## 2024-07-25 ENCOUNTER — TELEPHONE (OUTPATIENT)
Dept: FAMILY MEDICINE CLINIC | Facility: CLINIC | Age: 64
End: 2024-07-25

## 2024-07-25 NOTE — TELEPHONE ENCOUNTER
Patient called stating she was seen yesterday 07/24/2024. Per patient she went to  the following medication:      Fexofenadine 180 MG Oral Tab     Per patient her pharmacy does not have it on file. I verified her pharmacy with her and it was correct.

## 2024-07-26 NOTE — TELEPHONE ENCOUNTER
RN called [facility] and was on hold for more than 5 mins.   RN spoke with the patient, states that that pharmacy has the order when she went there for a second time but could not dispense due to insurance issue.   She will go to the pharmacy today and find out what  the insurance issue, instructed to update  us .   Asking if she just need to schedule the mammogram.  Informed the order is in the system and call Central scheduling, stated understanding .     No future appointments.

## 2024-08-14 ENCOUNTER — TELEPHONE (OUTPATIENT)
Dept: FAMILY MEDICINE CLINIC | Facility: CLINIC | Age: 64
End: 2024-08-14

## 2024-08-14 ENCOUNTER — HOSPITAL ENCOUNTER (EMERGENCY)
Facility: HOSPITAL | Age: 64
Discharge: HOME | End: 2024-08-14
Attending: EMERGENCY MEDICINE
Payer: MEDICARE

## 2024-08-14 ENCOUNTER — HOSPITAL ENCOUNTER (OUTPATIENT)
Age: 64
Discharge: OTHER INSTITUTION | End: 2024-08-14
Payer: MEDICARE

## 2024-08-14 VITALS
TEMPERATURE: 98 F | OXYGEN SATURATION: 96 % | HEIGHT: 63 IN | SYSTOLIC BLOOD PRESSURE: 136 MMHG | DIASTOLIC BLOOD PRESSURE: 69 MMHG | BODY MASS INDEX: 29.77 KG/M2 | WEIGHT: 168 LBS | HEART RATE: 67 BPM | RESPIRATION RATE: 20 BRPM

## 2024-08-14 VITALS
SYSTOLIC BLOOD PRESSURE: 154 MMHG | OXYGEN SATURATION: 99 % | HEART RATE: 90 BPM | TEMPERATURE: 99 F | DIASTOLIC BLOOD PRESSURE: 80 MMHG | RESPIRATION RATE: 20 BRPM

## 2024-08-14 DIAGNOSIS — R51.9 NONINTRACTABLE EPISODIC HEADACHE, UNSPECIFIED HEADACHE TYPE: Primary | ICD-10-CM

## 2024-08-14 DIAGNOSIS — R51.9 ACUTE INTRACTABLE HEADACHE, UNSPECIFIED HEADACHE TYPE: Primary | ICD-10-CM

## 2024-08-14 PROCEDURE — 99215 OFFICE O/P EST HI 40 MIN: CPT | Performed by: PHYSICIAN ASSISTANT

## 2024-08-14 PROCEDURE — 99284 EMERGENCY DEPT VISIT MOD MDM: CPT

## 2024-08-14 PROCEDURE — 99283 EMERGENCY DEPT VISIT LOW MDM: CPT

## 2024-08-14 RX ORDER — PROCHLORPERAZINE MALEATE 10 MG
10 TABLET ORAL ONCE
Status: COMPLETED | OUTPATIENT
Start: 2024-08-14 | End: 2024-08-14

## 2024-08-14 RX ORDER — BUTALBITAL, ACETAMINOPHEN AND CAFFEINE 50; 325; 40 MG/1; MG/1; MG/1
1-2 TABLET ORAL EVERY 4 HOURS PRN
Qty: 20 TABLET | Refills: 0 | Status: SHIPPED | OUTPATIENT
Start: 2024-08-14

## 2024-08-14 RX ORDER — DIPHENHYDRAMINE HCL 25 MG
50 CAPSULE ORAL ONCE
Status: COMPLETED | OUTPATIENT
Start: 2024-08-14 | End: 2024-08-14

## 2024-08-14 RX ORDER — IBUPROFEN 600 MG/1
600 TABLET ORAL ONCE
Status: COMPLETED | OUTPATIENT
Start: 2024-08-14 | End: 2024-08-14

## 2024-08-14 NOTE — ED PROVIDER NOTES
Patient Seen in: [facility]      History     Chief Complaint   Patient presents with    Headache     Stated Complaint: Headache; Dizziness    Subjective:   HPI    Patient is a 64-year-old female with history below, presenting to immediate care for evaluation of headaches.  Onset: 1 month.  Headaches are constant.  Headache is frontal and parietal.  Bilateral.  Constant.  Waxing and waning intensity.  Describes as moderate to severe pain.  Sensation of head pressure.  Sensation of \"head going to explode or feeling of swelling inside my brain\".  Saw primary doctor on 7/24/2024 for routine annual exam.  States discussed the symptoms at that time had similar symptoms.  States likely related to underlying allergies.  Prescribed Allegra for symptoms with no resolution of symptoms.  In addition has been taking ibuprofen and Tylenol for symptoms with no relief.  She feels at times unsteady in her feet, brain fogginess, and difficulty concentrating.  Symptoms are moderate to severe.  Concern given intensity and duration of symptoms.  Sent by primary for initial evaluation symptoms.  She denies any fevers.  No URI symptoms.  Denies any sinus pain/pressure.  No nasal drainage.  Denies any chest pain or shortness of breath.  No vision changes.  No facial droop.  No slurred speech.  No gait ataxia.  No extremity numbness weakness paresthesias.  Denies trauma        Objective:   Past Medical History:    Biliary colic    acute    Cholelithiasis    Diabetes (HCC)    not taking medicines    Ectopic pregnancy (HCC)    Migraines              Past Surgical History:   Procedure Laterality Date    Breast biopsy Left 02/18/2021    Left Breast Microductectomy    Cholecystectomy      Colonoscopy  2009    Colonoscopy N/A 08/18/2020    Procedure: COLONOSCOPY;  Surgeon: Desmond Valdez MD;  Location: [facility] ENDOSCOPY    Colonoscopy N/A 10/31/2023    Procedure: COLONOSCOPY;  Surgeon: Desmond Valdez MD;   Location: [facility] ENDOSCOPY    Electrocardiogram, complete  05/20/2014    scanned to media tab    Laparoscopy w cholangiogram  2014    Laparoscopic cholecystectomy w/ intraoperative cholangiogram    Needle biopsy left      Upper gi endoscopy,biopsy  2015                Social History     Socioeconomic History    Marital status:    Tobacco Use    Smoking status: Never    Smokeless tobacco: Never   Vaping Use    Vaping status: Never Used   Substance and Sexual Activity    Alcohol use: No     Alcohol/week: 0.0 standard drinks of alcohol    Drug use: No    Sexual activity: Yes   Other Topics Concern    Caffeine Concern Yes     Comment: coffee, 24 oz./day    Exercise Yes     Comment: walk 2 times daily              Review of Systems   Constitutional:  Negative for chills and fever.   HENT:  Negative for congestion, ear pain, facial swelling, sinus pressure, sneezing and sore throat.    Eyes:  Negative for visual disturbance.   Respiratory:  Negative for cough and shortness of breath.    Cardiovascular:  Negative for chest pain.   Gastrointestinal:  Negative for nausea and vomiting.   Musculoskeletal:  Negative for back pain, gait problem, joint swelling, neck pain and neck stiffness.   Allergic/Immunologic: Negative for immunocompromised state.   Neurological:  Positive for dizziness, light-headedness and headaches.   Psychiatric/Behavioral:  Positive for confusion and decreased concentration.        Positive for stated Chief Complaint: Headache    Other systems are as noted in HPI.  Constitutional and vital signs reviewed.      All other systems reviewed and negative except as noted above.    Physical Exam     ED Triage Vitals [08/14/24 1620]   /80   Pulse 90   Resp 20   Temp 99 °F (37.2 °C)   Temp src Temporal   SpO2 99 %   O2 Device None (Room air)       Current Vitals:   Vital Signs  BP: 154/80  Pulse: 90  Resp: 20  Temp: 99 °F (37.2 °C)  Temp src: Temporal    Oxygen Therapy  SpO2: 99 %  O2 Device: None  (Room air)            Physical Exam  Vitals and nursing note reviewed.   Constitutional:       General: She is not in acute distress.     Appearance: Normal appearance. She is not ill-appearing, toxic-appearing or diaphoretic.   HENT:      Head: Normocephalic and atraumatic.      Mouth/Throat:      Mouth: Mucous membranes are moist.   Eyes:      Conjunctiva/sclera: Conjunctivae normal.   Cardiovascular:      Rate and Rhythm: Normal rate.      Pulses: Normal pulses.   Pulmonary:      Effort: Pulmonary effort is normal. No respiratory distress.   Musculoskeletal:         General: No deformity. Normal range of motion.      Cervical back: Normal range of motion and neck supple. No rigidity.   Neurological:      General: No focal deficit present.      Mental Status: She is alert and oriented to person, place, and time.      GCS: GCS eye subscore is 4. GCS verbal subscore is 5. GCS motor subscore is 6.      Motor: No weakness, tremor or abnormal muscle tone.      Coordination: Coordination is intact.      Gait: Gait normal.   Psychiatric:         Mood and Affect: Mood normal.         Behavior: Behavior normal.           ED Course   Labs Reviewed - No data to display             MDM     Patient is a 64-year-old female, presenting to immediate care for evaluation of persistent generalized headache for approximately 1 month.  Unrelieved with ibuprofen, Tylenol, and oral antihistamine as initially thought may be related to underlying allergies.  Patient without fever or systemic symptoms.  Does endorse symptoms with moderate to severe in severity with persistent duration and sensation of difficulty concentrating and feeling unsteady.  Discussed limitations of immediate care evaluation.  Patient warrants screening labs and advanced imaging.  Will go to ER for evaluation of symptoms.                                   Medical Decision Making      Disposition and Plan     Clinical Impression:  1. Acute intractable headache,  unspecified headache type         Disposition:  Ic to ed  8/14/2024  4:39 pm    Follow-up:  No follow-up provider specified.        Medications Prescribed:  Current Discharge Medication List

## 2024-08-14 NOTE — TELEPHONE ENCOUNTER
Called patient (name and  verified) and instructed on providers message below. Patient verbalized understanding and agrees to plan.

## 2024-08-14 NOTE — ED INITIAL ASSESSMENT (HPI)
Pt here with complaints of head pressure that began 2 weeks ago , pt states she was seen by her primary and prescribed allergy meds which has not worked, pt states she has some dizziness at times, pt denies any fevers

## 2024-08-14 NOTE — TELEPHONE ENCOUNTER
Dr Rosas, please advise  Routing to podmate-Dr Weiler      Patient (name and  verified) calling with continued symptoms of pressure in her head for the last month. Patient informed the PCP at the last office visit on 24 and was prescribed Allegra. Patient states that the medication is not helping. She has been taking the medication daily for the last 2 weeks.  Patient states she has pressure in her head with eye pressure.   Patient denies any nasal drainage.   Patient denies any new URI symptoms. Denies fever.  Patient has also tried Ibuprofen and tylenol with no change.     Patient is asking for further recommendations?   There are no FM appts for recheck.   Patient is also considering going to the urgent care if needed; instructed patient to be seen in the urgent care if needed, hours and location provided.

## 2024-08-15 ENCOUNTER — TELEPHONE (OUTPATIENT)
Dept: NEUROLOGY | Facility: CLINIC | Age: 64
End: 2024-08-15

## 2024-08-15 NOTE — TELEPHONE ENCOUNTER
Contacted pt no answer lvm offering Thursday 08/22 2 :20 at the [place] office. Advised to cb to confirm on new appt.

## 2024-08-15 NOTE — TELEPHONE ENCOUNTER
Pt called stating she was seen in the ED on 8/14 for headaches. Pt was instructed to follow up with dr payton. Pt is currently scheduled for 11/21, but would like to be seen sooner in office. Please advise

## 2024-08-15 NOTE — ED PROVIDER NOTES
Patient Seen in: [facility] Emergency Department    History     Chief Complaint   Patient presents with    Headache       HPI    The patient presents to the ED complaining of headache for the past 2 weeks.  She states gradual onset of headache and headache comes and goes.  Not better with allergy medication prescribed by her doctor.  She denies any vision change, neck stiffness, fevers chills or other complaints.    History reviewed.   Past Medical History:    Biliary colic    acute    Cholelithiasis    Diabetes (HCC)    not taking medicines    Ectopic pregnancy (HCC)    Migraines       History reviewed.   Past Surgical History:   Procedure Laterality Date    Breast biopsy Left 02/18/2021    Left Breast Microductectomy    Cholecystectomy      Colonoscopy  2009    Colonoscopy N/A 08/18/2020    Procedure: COLONOSCOPY;  Surgeon: Desmond Valdez MD;  Location: [facility] ENDOSCOPY    Colonoscopy N/A 10/31/2023    Procedure: COLONOSCOPY;  Surgeon: Desmond Valdez MD;  Location: [facility] ENDOSCOPY    Electrocardiogram, complete  05/20/2014    scanned to media tab    Laparoscopy w cholangiogram  2014    Laparoscopic cholecystectomy w/ intraoperative cholangiogram    Needle biopsy left      Upper gi endoscopy,biopsy  2015         Medications :  (Not in a hospital admission)       Family History   Problem Relation Age of Onset    Hypertension Mother     Arthritis Mother         rheumatoid    Psoriasis Mother     Diabetes Father     Diabetes Brother         per NG: \"four brothers have diabetes\"    Cancer Paternal Aunt         lung    Breast Cancer Paternal Cousin Female         40s    Ovarian Cancer Maternal Cousin Female 50       Smoking Status:   Social History     Socioeconomic History    Marital status:    Tobacco Use    Smoking status: Never    Smokeless tobacco: Never   Vaping Use    Vaping status: Never Used   Substance and Sexual Activity    Alcohol use: No     Alcohol/week: 0.0 standard drinks  of alcohol    Drug use: No    Sexual activity: Yes   Other Topics Concern    Caffeine Concern Yes     Comment: coffee, 24 oz./day    Exercise Yes     Comment: walk 2 times daily       Constitutional and vital signs reviewed.      Social History and Family History elements reviewed from today, pertinent positives to the presenting problem noted.    Physical Exam     ED Triage Vitals [08/14/24 1719]   BP (!) 155/92   Pulse 86   Resp 20   Temp 98 °F (36.7 °C)   Temp src Temporal   SpO2 96 %   O2 Device None (Room air)       All measures to prevent infection transmission during my interaction with the patient were taken. Handwashing was performed prior to and after the exam.  Stethoscope and any equipment used during my examination was cleaned with super sani-cloth germicidal wipes following the exam.     Physical Exam  Vitals and nursing note reviewed.   Constitutional:       General: She is not in acute distress.     Appearance: She is well-developed. She is not ill-appearing or toxic-appearing.   HENT:      Head: Normocephalic and atraumatic.   Eyes:      General:         Right eye: No discharge.         Left eye: No discharge.      Extraocular Movements: Extraocular movements intact.      Conjunctiva/sclera: Conjunctivae normal.      Pupils: Pupils are equal, round, and reactive to light.   Neck:      Trachea: No tracheal deviation.   Cardiovascular:      Rate and Rhythm: Normal rate.   Pulmonary:      Effort: Pulmonary effort is normal. No respiratory distress.   Musculoskeletal:         General: No deformity.   Skin:     General: Skin is warm and dry.   Neurological:      General: No focal deficit present.      Mental Status: She is alert and oriented to person, place, and time.      Cranial Nerves: No cranial nerve deficit.      Sensory: No sensory deficit.      Motor: No weakness.      Coordination: Coordination normal.      Gait: Gait normal.      Deep Tendon Reflexes: Reflexes normal.   Psychiatric:          Mood and Affect: Mood normal.         Behavior: Behavior normal.         ED Course      Labs Reviewed - No data to display    As Interpreted by me    Imaging Results Available and Reviewed while in ED: No results found.  ED Medications Administered:   Medications   diphenhydrAMINE (Benadryl) cap/tab 50 mg (50 mg Oral Given 8/14/24 1847)   ibuprofen (Motrin) tab 600 mg (600 mg Oral Given 8/14/24 1847)   prochlorperazine (Compazine) tab 10 mg (10 mg Oral Given 8/14/24 1847)         MDM     Vitals:    08/14/24 1719 08/14/24 1953   BP: (!) 155/92 136/69   Pulse: 86 67   Resp: 20 20   Temp: 98 °F (36.7 °C)    TempSrc: Temporal    SpO2: 96% 96%   Weight: 76.2 kg    Height: 160 cm (5' 3\")      *I personally reviewed and interpreted all ED vitals.    Pulse Ox: 96%, Room air, Normal     Differential Diagnosis/ Diagnostic Considerations: Nonspecific headache, sinus headache, other    Complicating Factors: The patient already has does not have any pertinent problems on file. to contribute to the complexity of this ED evaluation.    Medical Decision Making  The patient presents to the ED with a headache for 2 weeks.  No concern for intracranial hemorrhage or meningitis given gradual onset of headache, episodic headache course and no fevers chills or neck stiffness.  Patient well-appearing in ED.  Improved with medication given in the ED.  Stable for discharge home with continued headache medication and neurology follow-up.    Problems Addressed:  Nonintractable episodic headache, unspecified headache type: acute illness or injury    Risk  Prescription drug management.        Condition upon leaving the department: Stable    Disposition and Plan     Clinical Impression:  1. Nonintractable episodic headache, unspecified headache type        Disposition:  Discharge    Follow-up:  Reinier Antoine DO  [address] [id number] [phone]    Schedule an appointment as soon as possible for a visit in 1  week(s)        Medications Prescribed:  Discharge Medication List as of 8/14/2024  7:54 PM        START taking these medications    Details   butalbital-acetaminophen-caffeine -40 MG Oral Tab Take 1-2 tablets by mouth every 4 (four) hours as needed for Headaches., Normal, Disp-20 tablet, R-0

## 2024-08-22 ENCOUNTER — OFFICE VISIT (OUTPATIENT)
Dept: NEUROLOGY | Facility: CLINIC | Age: 64
End: 2024-08-22
Payer: MEDICARE

## 2024-08-22 ENCOUNTER — LAB ENCOUNTER (OUTPATIENT)
Dept: LAB | Facility: HOSPITAL | Age: 64
End: 2024-08-22
Attending: Other
Payer: MEDICARE

## 2024-08-22 DIAGNOSIS — R51.9 NEW ONSET OF HEADACHES AFTER AGE 50: Primary | ICD-10-CM

## 2024-08-22 DIAGNOSIS — R51.9 NEW ONSET OF HEADACHES AFTER AGE 50: ICD-10-CM

## 2024-08-22 DIAGNOSIS — R79.9 ABNORMAL FINDING OF BLOOD CHEMISTRY, UNSPECIFIED: ICD-10-CM

## 2024-08-22 LAB
CRP SERPL-MCNC: <0.4 MG/DL (ref ?–1)
DEPRECATED HBV CORE AB SER IA-ACNC: 148.9 NG/ML
DEPRECATED RDW RBC AUTO: 41.5 FL (ref 35.1–46.3)
ERYTHROCYTE [DISTWIDTH] IN BLOOD BY AUTOMATED COUNT: 13 % (ref 11–15)
ERYTHROCYTE [SEDIMENTATION RATE] IN BLOOD: 16 MM/HR
HCT VFR BLD AUTO: 42.5 %
HGB BLD-MCNC: 14.7 G/DL
MCH RBC QN AUTO: 30.2 PG (ref 26–34)
MCHC RBC AUTO-ENTMCNC: 34.6 G/DL (ref 31–37)
MCV RBC AUTO: 87.3 FL
PLATELET # BLD AUTO: 264 10(3)UL (ref 150–450)
RBC # BLD AUTO: 4.87 X10(6)UL
WBC # BLD AUTO: 6.9 X10(3) UL (ref 4–11)

## 2024-08-22 PROCEDURE — 36415 COLL VENOUS BLD VENIPUNCTURE: CPT

## 2024-08-22 PROCEDURE — 82728 ASSAY OF FERRITIN: CPT

## 2024-08-22 PROCEDURE — 86140 C-REACTIVE PROTEIN: CPT

## 2024-08-22 PROCEDURE — 85652 RBC SED RATE AUTOMATED: CPT

## 2024-08-22 PROCEDURE — 85027 COMPLETE CBC AUTOMATED: CPT

## 2024-08-22 PROCEDURE — 99204 OFFICE O/P NEW MOD 45 MIN: CPT | Performed by: OTHER

## 2024-08-22 RX ORDER — PREDNISONE 20 MG/1
TABLET ORAL
Qty: 24 TABLET | Refills: 0 | Status: SHIPPED | OUTPATIENT
Start: 2024-08-22 | End: 2024-09-02

## 2024-08-22 RX ORDER — SUMATRIPTAN 100 MG/1
TABLET, FILM COATED ORAL
Qty: 9 TABLET | Refills: 11 | Status: SHIPPED | OUTPATIENT
Start: 2024-08-22

## 2024-08-22 NOTE — PATIENT INSTRUCTIONS
To prevent headaches please take the following medication(s) daily: prednisone until completed.   When you feel the headache coming on, take: sumatriptan    Lifestyle modifications for migraine:  Recommend  a stable daily schedule as changes in your  daily schedule trigger headaches.    This includes maintaining the same bedtime and wake up time (including weekends), eating meals regularly, exercising, and maintaining a consistently low stress lifestyle.  Exercise: moderate intensity aerobic exercise (150 minutes/week in 3-5 sessions).  Practice good sleep hygiene. Maintain regular sleep cycles with bedtime and wake up times that do not differ significantly between weekdays and weekends.  Maintain hydration: Visit headacheBurstly.com to determine the individual water needs based on gender, weight, weather, and level of physical activity.  Avoid factors that increase the risk of developing more frequent headaches including caffeine use, obesity, certain sleep disorders, and medication overuse.  To determine if caffeine avoidance will decrease your headaches you must abstain for at least 2 to 3 months.  If obese or overweight consider weight loss which will decrease the number of headache days.  Simple analgesics (Advil, Tylenol, Motrin, Ibuprofen, etc) are overused if taken on 15 or more days per month regardless of the reason.  All other medications are overused when taken on 10 or more days per month.  Avoid triggers.    To Do” checklist for improving your headache control    1. Initiate (and stick to) an aerobic conditioning program.    2. Keep a careful headache diary.    3. Maintain good sleep hygiene.    4. Eat nutritional, regularly spaced meals.    5. Treat acute attacks of migraine early and aggressively.    6. Don’t overuse symptomatic medication.    7. If you require prevention/suppression therapy, give the specific therapy a decent chance.    8. If you suffer from a migraine “co-morbidity,” such as  chronic insomnia, anxiety, depression, or other medical problems, seek treatment for that condition.    9. Regularly set aside time for personal relaxation and stress reduction.    10. Give back . . . to your family, friends, community. No matter how badly your migraine makes you feel, you will deal with your headache disorder more effectively if you are actively engaged in the world around you.    HEADACHE / MIGRAINE LIFESTYLE INFORMATION     Headache Preventive Treatment:   Please keep in mind that it takes 4-6 weeks for the medication to start working well and 2-3 months at the appropriate dose before deciding if it will be useful or not. If it is not helping at all by this time, then we will discuss other medications to try. Supplements may take 3-6 months until you see full effect.     Natural supplements:  Magnesium Oxide 400 mg at bedtime   Coenzyme Q10 300 mg in AM  Vitamin B2- 400mg in the morning   Feverfew 50 mg twice a day    Vitamins and herbs that show potential    Magnesium: Magnesium (400 mg at bedtime) has a relaxant effect on smooth muscles such as blood vessels. Individuals suffering from frequent or daily headache usually have low magnesium levels which can be increase with daily supplementation of 400-750 mg. Three trials found 40-90% average headache reduction  when used as a preventative. Magnesium also demonstrated the benefit in menstrually related migraine.  Magnesium is part of the messenger system in the serotonin cascade and it is a good muscle relaxant.  It is also useful for constipation which can be a side effect of other medications used to treat migraine. Good sources include nuts, whole grains, and tomatoes.    Riboflavin (vitamin B 2) 400 mg in the morning. This vitamin assists nerve cells in the production of ATP a principal energy storing molecule.  It is necessary for many chemical reactions in the body.  There have been at least 3 clinical trials of riboflavin using 400 mg per  day all of which suggested that migraine frequency can be decreased.  All 3 trials showed significant improvement in over half of migraine sufferers.  The supplement is found in bread, cereal, milk, meat, and poultry.  Most Americans get more riboflavin than the recommended daily allowance, however riboflavin deficiency is not necessary for the supplements to help prevent headache.    Feverfew: Feverfew is a common garden herb native to [place] and popular in [place]  as a treatment for disorders typically controlled by aspirin.  The mechanism of action is unknown but is believed to be related to a chemical called parthenolide which helps the body use serotonin more effectively.  Serotonin helps prevent migraine and assists with resolution when it occurs.  Parthenolide also inhibits the release of histamine which is linked to pain and inflammation.    Consistency of active ingredients in different products can be a problem.  Some formulations don't have the active ingredient (parthenolide) that prevents migraine.  A parthenolide content of 0.2% is generally recommended.  Typical dosage is one capsule 3 times a day.    Coenzyme Q10: This is present in almost all cells in the body and is critical component for the conversion of energy.  Recent studies have shown that a nutritional supplement of CoQ10 can reduce the frequency of migraine attacks by improving the energy production of cells as with riboflavin.  Doses of 150 mg twice a day have been shown to be effective.    Melatonin: Increasing evidence shows correlation between melatonin secretion and headache conditions.  Melatonin supplementation has decreased headache intensity and duration.  It is widely used as a sleep aid.  Sleep is natures way of dealing with migraine.  A dose of 3 mg is recommended to start for headaches including cluster headache. Higher doses up to 15 mg has been reviewed for use in Cluster headache and have been used.  The rationale  behind using melatonin for cluster is that many theories regarding the cause of Cluster headache center around the disruption of the normal circadian rhythm in the brain.  This helps restore the normal circadian rhythm.    Claire: Claire has a small amount of antihistamine and anti-inflammatory action which may help headache.  It is primarily used for nausea and may aid in the absorption of other medications.    HEADACHE DIET: Foods and beverages which may trigger migraine  Note that only 20% of headache patients are food sensitive. You will know if you are food sensitive if you get a headache consistently 20 minutes to 2 hours after eating a certain food. Only cut out a food if it causes headaches, otherwise you might remove foods you enjoy! What matters most for diet is to eat a well balanced healthy diet full of vegetables and low fat protein, and to not miss meals.    Chocolate, other sweets    ALL cheeses except cottage and cream cheese    Dairy products, yogurt, sour cream, ice cream    Liver    Meat extracts (Bovril, Marmite, meat tenderizers)    Meats or fish which have undergone aging, fermenting, pickling or smoking. These include: Hotdogs,salami,Lox,sausage,  mortadellas,smoked salmon, pepperoni, Pickled herring    Pods of broad bean (English beans, Chinese pea pods, Italian (zohra) beans, lima and navy beans    Ripe avocado, ripe banana    Yeast extracts or active yeast preparations such as Bowen's or Antonino's (commercial bakes goods are permitted)    Tomato based foods, pizza (lasagna, etc.)    MSG (monosodium glutamate) is disguised as many things; look for these common aliases:  Monopotassium glutamate  Autolysed yeast  Hydrolysed protein  Sodium caseinate  “flavorings”  “all natural preservatives\"    Nutrasweet    Avoid all other foods that convincingly provoke headaches.    Headache Prevention Strategies:    1. Maintain a headache diary; learn to identify and avoid triggers. Common triggers  include:    Emotional triggers:  Emotional/Upset family or friends  Emotional/Upset occupation  Business reversal/success  Anticipation anxiety  Crisis-serious  Post-crisis periodNew job/position     Physical triggers:  Vacation Day  Weekend  Strenuous Exercise  High Altitude Location  New Move  Menstrual Day  Physical Illness  Oversleep/Not enough sleep  Weather changes  Light: Photophobia or light sesnitivity treatment involves a balance between desensitization and reduction in overly strong input. Use dark polarized glasses outside, but not inside. Avoid bright or fluorescent light, but do not dim environment to the point that going into a normally lit room hurts. Consider FL-41 tint lenses, which reduce the most irritating wavelengths without blocking too much light.  These can be obtained at [employer] or [employer]  Foods: see list above.    2. Limit use of acute treatments (over-the-counter medications, triptans, etc.) to no more than 2 days per week or 10 days per month to prevent medication overuse headache (rebound headache).      3. Follow a regular schedule (including weekends and holidays):  Don't skip meals. Eat a balanced diet.  8 hours of sleep nightly.  Minimize stress.  Exercise 30 minutes per day. Being overweight is associated with a 5 times increased risk of chronic migraine.  Keep well hydrated and drink at least 6-8 glasses of water per day.    4. Initiate non-pharmacologic measures at the earliest onset of your headache.  Rest and quiet environment.  Relax and reduce stress. Vwntfmv9Itzgb is a free sami that can instruct you on    some simple relaxtion and breathing techniques. Http://[employer] is a    free website that provides teaching videos on relaxation.  Also, there are  many apps that   can be downloaded for “mindful” relaxation.  An sami called YOGA NIDRA will help walk you through mindfulness.  Cold compresses.    5. Don't wait!! Take the maximum allowable dosage of  prescribed medication at the first sign of migraine.    6. Compliance:  Take prescribed medication regularly as directed and at the first sign of a migraine.    7. Communicate:  Call your physician when problems arise, especially if your headaches change, increase in frequency/severity, or become associated with neurological symptoms (weakness, numbness, slurred speech, etc.).    8. Headache/pain management therapies: Consider various complementary methods, including medication, behavioral therapy, psychological counselling, biofeedback, massage therapy, acupuncture, dry needling, and other modalities.  Such measures may reduce the need for medications. Counseling for pain management, where patients learn to function and ignore/minimize their pain, seems to work very well.    9. Recommend changing family's attention and focus away from patient's headaches. Instead, emphasize daily activities. If first question of day is 'How are your headaches/Do you have a headache today?', then patient will constantly think about headaches, thus making them worse. Goal is to re-direct attention away from headaches, toward daily activities and other distractions.    10. Helpful Websites:  www.AmericanHeadacheSociety.org  www.migrainetrust.org  www.headaches.org  www.migraine.org.uk  www.achenet.org    11. HEADACHE EXPECTATIONS:  There are many types of headaches, and only a rare few in which complete relief can be expected.  In general, there is no cure for headache, especially migraine based headaches.  There is nothing available that completely prevents headaches from occurring, breaking through, or having periodic flare-ups and fluctuations.  Regardless of what you are using on a daily basis for prevention, episodic headaches should still be expected, and periods where frequency may escalate and fluctuate are unavoidable.     There is no quick fix for most headaches.  Furthermore, the longer you have had high frequency headaches  (such as chronic daily headache), the longer it will likely take to expect any improvement.  In fact, some people will never improve, regardless of how many medications or other treatments we try.  Our treatment strategy is to evaluate for possible causes of your headache, although testing is usually always normal, even in cases of daily continuous headaches for years.  Most types of headache such as migraine are electrical brain disorders (similar to how epilepsy is an electrical brain disorders).  Therefore, there is no testing that will reveal this \"dysfunctional electrical circuitry\" such on MRI, or other testing.  We try to find a medication that may help lessen the frequency and/or severity of your headaches.  The goal is not to completely stop them from happening, although if that happens, great!  Different people respond to different medications, and some people just don't respond to anything, so it's usually a matter of trying different options.  We can not predict if or when exactly you will respond to a treatment that we provide.     Preventive headache medications take 4-6 weeks to start working, and 2-3 months to see full effect, assuming you reach an effective dose.  Therefore, calling or messaging frequently because you have a headache flare prior to the 3 month tracie is unlikely to change anything, and unfortunately there is nothing available that will expedite this, so please try to avoid this.  Our recommendation will generally be to give it adequate time first.  If you are unable to wait it out for medications to work, we can also try IV infusions for some temporary relief.  Or, we offer our 3 week outpatient chronic daily headache program (IMATCH) to help you better learn how to function and deal with chronic headache issues.     In general, the best that preventive medications or other treatments (including Botox) are able to offer in migraine management (variable in other headache types) is a  50% improvement in frequency and/or severity of headache.  That is our goal, and any additional benefit is considered a bonus.  Some people do significantly better than this, others do not get close to this.  Therefore, if your headaches are not improving by at least 3 months on your preventive strategy, contact us and we can discuss further adjustments.  Keep in mind that complete headache cure is not a realistic expectation.    Our Team:  The nursing staff, and medical assistants are a major part of YOUR TREATMENT TEAM and will be handling your phone calls and inquiries, if any. Unless explicitly told otherwise at the time of your office visit, your study results and ensuing treatment plans will be released via ClipClock and discussed during your follow-up appointment.     MyChart: Please ask the schedulers to give you an activation code. The main way of communication is by [employer] rather than phone lines, so if you have not signed up, please do so. Narviit is also the way that you can review your labs and testing.  We are not able to contact everyone to tell them results are normal.  If you do not hear back from us regarding testing you have had, it should be considered normal or within normal range.  If you have any questions about the results, you are free to message us.     ClipClock is meant for simple questions regarding medications, possible side effects, or other simple straight forward questions in limited sentences, rather than multiple paragraphs of discussion.  [employer]Clock is not meant for, or efficient for these complex questions, extensive questions, extensive medication adjustments, complex new symptoms or concerns.  These issues beyond simple questions require a follow up visit with myself,      Refills:  Please pay attention to when your refills will need to be renewed. Due to the volume of phone calls daily, this could potentially take a few days, although we certainly try to honor your refill requests  as soon as we can.  You should call at least 1 week in advance of needing a refill to ensure you do not run out of medication.  Keep in mind that refill requests on Fridays may not be filled until the following week.

## 2024-08-22 NOTE — PROGRESS NOTES
[facility]  [address], [address]  [facility] [address]  [phone]          [facility]  NEUROLOGY VISIT FOR HEADACHE  Reason for Admission/Consultation: headache;    Requested by:   PCP: Chikis Rosas MD  Chief Complaint: Neurologic Problem (NPT Patient presents here today to establish care, patient was referred by [facility] ER to follow up, patient c/o migraines/headache  with aura 10-15 per month with light sensitivity, dizziness, light nausea, pressure since July 2024, also has neck and shoulder pain./Unable to tolerate butalbital-acetaminophen-caffeine -40 MG . /)      HPI:  Hermila Solorio is a 64 year old female  w/ a pmhx of  DM lumbar disk disease who presents for  1 mo of  new persistent daily headaches that are severe.   She gerard constant headaches for at least 1 month.  She had headaches around the time of menopause that went away.    First began  with otological sx  Had neck pain  Had intermittent HA that become constant.  +dizziness.  No room spinning.     However, now watching her niece on the [employer]-round triggered her dizziness. This makes her anxious.   She was given Fioricet but could not tolerate.    Reports the HA  have fluctuating intensities.         The impact of the headache on the patient's lifestyle is:  very bothersome 'in some part of my life. \" Does not want to drive to go  do errands b/c of her  HA; they make her feel anxious.   # of days missed from work/school:   re the headaches occurring more often or becoming more severe?  New onset headache in the last month  Symptom progression in past six months:  new onset headache      Headache Description  How does the headache begin?   Begins as a holoacranial headache; then at night can radiate to her neck and shoulders.  Intensity (overall): 4/10 at a minimum,  8/10 on 0-10 scale (10 being most painful) at a maximum.  Location: \" my whole head but it could be worse in the left   temporal region.\"  Side locked: No. But may involve her left temple more.  Frequency: almost daily.  When this author call it a headache she calls it \"the pressure.\"   Duration:  at least 4 hrs.  Character:  vice-like pain; pressure, feels like someone is squeezing her head. Reports her periorbital region gets \"very warm.\"   Associated Symptoms:  photophobia, phonophobia, osmophobia, and nausea  Autonomic Symptoms: denies any autonomic symptoms   Precipitating Factors: ?  Most common time of day for the headache to begin:  anytime  Does the headache wake you from sleep? No    Prodrome Symptoms:   Are the headaches preceded by warning signs?  Yes  - Auras?  ??Yes photopsias and visual scintillations. She sees a bright white light in her left visual field.  Occurs suddenly. \"It is not permanent. It just turns in my eye socket. Lasts seconds.\" She reports seeing a ring in her  visual field. The ring is a \"white and bright.\" Again, lasts seconds.  \"It can happen when the headache is more severe.\"   Do you stop what you are doing during the headache?  Yes    Do you have multiple types of headaches:No    If > 50 years old with new HA:  GCA Symptoms:  -Temporal HA: Yes  -Vision loss/diplopia/other vision problems: No except for the white ring she sees briefly  -Constitutional symptoms: No  -Jaw claudication: No  -Tongue numbness: No  -Scalp tenderness: No  -PMR symptoms: No    Relevant personal history, habits and occupation:   Sleep pattern/sleep disturbances, significant stressors?: No   Medication overuse?  No  Food: EtOH consumption, excess caffeine consumption (including cola and chocolate), nitrite-containing foods (such as hot dogs and pepperoni), tyramine (in aged cheeses and soy sauce), MSG: No      - Pt admits to a history of easily becoming motion sick as a child.  - There is a family history of headaches.     Headache Risk Factors:  Headache risk factors and/or co-morbidities  Yes Neck Pain  No Back Pain     No  History of Traumatic Brain Injury and/or Concussion  No History of Syncope          Red flags for potential life-threatening headache  - Does it occur when you strain or lift?  No  - Systemic symptoms/signs/disease including fever, chills, rash, myalgia, night sweats, weight loss, comorbid systemic disease, immunocompromise state, malignancy, pregnancy, or postpartum No  - Neurological symptoms/signs including changes in mental status or level of consciousness, diplopia, abnormal cranial nerve function, pulsatile tinnitus, loss of sensation, weakness, ataxia, history of seizure/collapse/loss of consciousness: No  - Thunderclap HA: No   - Older than 50; pt is currently 64 year old.  - Change in pattern.  Progressive headaches (to daily, or continuous pattern): Yes   - Worse w/ Valsalva: No  - Postural aggravation: No          We discussed the 4 phases of a migraine headache including the prodrome/premonitory phase, aura, the headache, and the post drome. I explained the pathophysiology of migraine including cortical spreading depression in layman's terms.  I advised the patient to monitor for any premonitory symptoms or auras.  I discussed that migraine treatment would require a multipronged approach including lifestyle changes as well as medications.    Review and summation of prior records      ROS:  Pertinent positive and negatives per HPI.  All others were reviewed and negative.    Past Medical History:    Biliary colic    acute    Cholelithiasis    Diabetes (HCC)    not taking medicines    Ectopic pregnancy (HCC)    Migraines         Current Outpatient Medications:     fexofenadine 180 MG Oral Tab, Take 1 tablet (180 mg total) by mouth daily., Disp: 90 tablet, Rfl: 3    Fluocinolone Acetonide Scalp 0.01 % External Oil, Use twice weekly, apply to affected areas and leave on scalp for at least 6 hours before rinsing., Disp: 118 mL, Rfl: 0    metFORMIN  MG Oral Tablet 24 Hr, Take 1 tablet (500 mg total) by  mouth daily., Disp: 90 tablet, Rfl: 3    atorvastatin 10 MG Oral Tab, Take 1 tablet (10 mg total) by mouth nightly., Disp: 90 tablet, Rfl: 3    butalbital-acetaminophen-caffeine -40 MG Oral Tab, Take 1-2 tablets by mouth every 4 (four) hours as needed for Headaches. (Patient not taking: Reported on 8/22/2024), Disp: 20 tablet, Rfl: 0    No Known Allergies    Past Surgical History:   Procedure Laterality Date    Breast biopsy Left 02/18/2021    Left Breast Microductectomy    Cholecystectomy      Colonoscopy  2009    Colonoscopy N/A 08/18/2020    Procedure: COLONOSCOPY;  Surgeon: Desmond Valdez MD;  Location: [facility] ENDOSCOPY    Colonoscopy N/A 10/31/2023    Procedure: COLONOSCOPY;  Surgeon: Desmond Valdez MD;  Location: [facility] ENDOSCOPY    Electrocardiogram, complete  05/20/2014    scanned to media tab    Laparoscopy w cholangiogram  2014    Laparoscopic cholecystectomy w/ intraoperative cholangiogram    Needle biopsy left      Upper gi endoscopy,biopsy  2015       Family History   Problem Relation Age of Onset    Hypertension Mother     Arthritis Mother         rheumatoid    Psoriasis Mother     Diabetes Father     Diabetes Brother         per NG: \"four brothers have diabetes\"    Cancer Paternal Aunt         lung    Breast Cancer Paternal Cousin Female         40s    Ovarian Cancer Maternal Cousin Female 50       Social history:  History   Smoking Status    Never   Smokeless Tobacco    Never     History   Alcohol Use No     History   Drug Use No       Family History   Problem Relation Age of Onset    Hypertension Mother     Arthritis Mother         rheumatoid    Psoriasis Mother     Diabetes Father     Diabetes Brother         per NG: \"four brothers have diabetes\"    Cancer Paternal Aunt         lung    Breast Cancer Paternal Cousin Female         40s    Ovarian Cancer Maternal Cousin Female 50       Objective:  Vitals  There were no vitals taken for this visit.  There is no height or weight on  file to calculate BMI.    Exam:  - General: appears stated age and no distress      Carotids: no bruits  - Pulmonary: no signs of respiratory distress.    Neurologic Exam  - Mental Status: Alert and attentive. Oriented x4.  Speech is spontaneous, fluent, and prosodic. Comprehension intact. Funds of knowledge are good. Phrase length and rate are normal. No paraphasic errors, neologisms, anomia, acalculia, apraxia, neglect, or R/L confusion.   - Cranial Nerves: No gaze preference. Visual fields:normal  Pupils are  4 mm briskly constricting to 3 mm and equally round and reactive to light  in a well lit room. EOMI. No nystagmus. No ptosis. V1-V3 intact B/L to light touch.No pathological facial asymmetry. No flattening of the nasolabial fold. .  Hearing grossly intact.  Tongue midline. No atrophy or fasiculations of the tongue noted. Palate and uvula elevate symmetrically.  Shoulder shrug symmetric.  - Fundoscopic exam:normal w/o hemorrhages, exudates, or papilledema.No attenuation. No pallor.  - Motor:  normal tone, normal bulk. No interosseous wasting. No flattening of hypothenar eminences.       Right Left     Motor Strength   Deltoids 5 5  Triceps 5 5  Biceps 5 5  Wrist Extensors   5 5   Hip Flexors 5 5   Knee extensors 5 5  Knee flexors 5 5  Plantar flexion 5 5  Dorsiflexion 5 5      Pronator drift: No pronator drift   Arm Rolling: No orbiting.   Finger Taps: Finger taps are symmetric in rate and amplitude.    Rapid movements: Rapid/fine movements are symmetric. As expected their dominant hand is slightly faster.   Foot Taps: Foot taps are symmetric.      Asterixis: No asterixis noted.   Tremor:      Reflexes:    C5 C6 C7  L4 S1   R 2+ 2+  2+ 2+   L 2+ 2+  2+ 2+   Adductor Spread: No adductor spread noted.    Frontal release signs:Not assessed.    Jaw Jerk:    Elizabeth's sign:absent   Nonsustained clonus: Absent   Sustained clonus: Absent   - Sensory:   Light touch: normal  Temperature: normal  Pinprick:    Vibration: normal  Proprioception:      Sensory level:      Romberg:   - Cerebellum: No truncal ataxia. No titubations. No dysmetria, no dysdiadochokinesis. No overshoot.   - Gait/station: Normal gait and station. Symmetric arm swing.   - Toe walking:  - Heel walking:  - Plantar response: flexor bilaterally    Data reviewed    Test results/Imaging:   Lab Results   Component Value Date    TSH 1.860 07/02/2019     Lab Results   Component Value Date    HDL 55 11/21/2023    LDL 92 11/21/2023    TRIG 102 11/21/2023     Lab Results   Component Value Date    HGB 14.5 11/21/2023    HCT 43.5 11/21/2023    MCV 89.1 11/21/2023    WBC 5.9 11/21/2023    .0 11/21/2023      Lab Results   Component Value Date    BUN 7 (L) 11/21/2023    CA 10.1 11/21/2023    ALT 28 11/21/2023    AST 25 11/21/2023    ALKPHOS 90 05/27/2016    ALB 4.6 11/21/2023     11/21/2023    K 4.2 11/21/2023     11/21/2023    CO2 26.0 11/21/2023      I have reviewed labs.    Performed an independent visualization of: CT brain WO  from 2019  Imaging revealed: agree with radiology read. Agree with radiology read.          Hermila Solorio is a 64 year old female w/ a pmhx  of  dm and lumbar disk disease who presents for management of their new onset headaches x 1 . Likkely migraine. Need to exclude secondary HA. Needs stat cta head and neck to evaluate for dissection.       New onset headache  Differential Diagnosis:  migraine   Secondary HA  Gca  Dissection  Sdh  Other structural abnormality  Diagnostics:  Imaging:MRI brain WO and CTA head/neck     Sleep study: not indicated  Maintain a daily headache diary  - Sed Rate, Westergren (Automated) [E]; Future  - C-Reactive Protein [E]; Future  - Ferritin [E]; Future  - CBC, Platelet, No Differential [E]; [employer]:    1. - predniSONE 20 MG Oral Tab; Take 3 tablets (60 mg total) by mouth daily for 4 days, THEN 2 tablets (40 mg total) daily for 4 days, THEN 1 tablet (20 mg total) daily for 4  days.  Dispense: 24 tablet; Refill: 0    3. Lifestyle modifications:  Recommend  a stable daily schedule as changes in a pt's daily schedule trigger HAs.  This includes bedtime and wake up time, eating meals regularly, exercising, and maintaining a consistently low stress lifestyle.  Exercise: moderate intensity aerobic exercise (150 minutes/week in 3-5 sessions).  Practice good sleep hygiene. Maintain regular sleep cycles with bedtime and wake up times that do not differ significantly between weekdays and weekends.  Maintain hydration: Visit [employer] to determine the individual water needs based on gender, weight, weather, and level of physical activity.  Avoid factors that increase the risk of developing more frequent headaches including caffeine, obesity, certain sleep disorders, and medication overuse.  To determine if caffeine avoidance will decrease HA burden pts must abstain for at least 2 to 3 months.  If obese or overweight consider weight loss which will decrease the number of headache days.  Simple analgesics are overused if taken on 15 or more days per month regardless of the reason.  All other medications are overused when taken on 10 or more days per month.  Avoid triggers.         Education/Instructions given to: patient  Instructed patient to call my office or seek medical attention immediately if symptoms worsen.  All questions were answered to the best of my abilities. All side effects of drugs were discussed.    Barriers to Learning:None  Content: Refer to note above. Evaluation/Outcome: Verbalized understanding    This document is not intended to support charting by exception.  Sections left blank in a completed note should be presumed not to have been done.           Return to clinic in: Return in about 1 month (around 9/22/2024).    Reinier Antoine DO  Staff Vascular [facility] General Neurology   08/22/24  3:14 PM

## 2024-08-23 ENCOUNTER — TELEPHONE (OUTPATIENT)
Dept: NEUROLOGY | Facility: CLINIC | Age: 64
End: 2024-08-23

## 2024-08-23 ENCOUNTER — HOSPITAL ENCOUNTER (OUTPATIENT)
Dept: CT IMAGING | Facility: HOSPITAL | Age: 64
Discharge: HOME | End: 2024-08-23
Attending: Other
Payer: MEDICARE

## 2024-08-23 DIAGNOSIS — R51.9 NEW ONSET OF HEADACHES AFTER AGE 50: ICD-10-CM

## 2024-08-23 LAB
CREAT BLD-MCNC: 0.7 MG/DL
EGFRCR SERPLBLD CKD-EPI 2021: 96 ML/MIN/1.73M2 (ref 60–?)

## 2024-08-23 PROCEDURE — 70496 CT ANGIOGRAPHY HEAD: CPT | Performed by: OTHER

## 2024-08-23 PROCEDURE — 82565 ASSAY OF CREATININE: CPT

## 2024-08-23 PROCEDURE — 70498 CT ANGIOGRAPHY NECK: CPT | Performed by: OTHER

## 2024-08-23 NOTE — TELEPHONE ENCOUNTER
Pt called in advised she tried scheduling mri and cta. Pt was able to schedule cta only since it was placed as stat. Pt needs mri to be corrected in order to get them both done. Pt is requesting a cb once completed. Pls advise.

## 2024-09-11 ENCOUNTER — OFFICE VISIT (OUTPATIENT)
Dept: FAMILY MEDICINE CLINIC | Facility: CLINIC | Age: 64
End: 2024-09-11

## 2024-09-11 VITALS
WEIGHT: 169 LBS | HEART RATE: 83 BPM | DIASTOLIC BLOOD PRESSURE: 74 MMHG | BODY MASS INDEX: 31.1 KG/M2 | OXYGEN SATURATION: 98 % | SYSTOLIC BLOOD PRESSURE: 120 MMHG | HEIGHT: 62 IN | TEMPERATURE: 97 F

## 2024-09-11 DIAGNOSIS — R51.9 NEW ONSET OF HEADACHES AFTER AGE 50: Primary | ICD-10-CM

## 2024-09-11 DIAGNOSIS — F41.1 GENERALIZED ANXIETY DISORDER: ICD-10-CM

## 2024-09-11 DIAGNOSIS — E11.9 TYPE 2 DIABETES MELLITUS WITHOUT COMPLICATION, WITHOUT LONG-TERM CURRENT USE OF INSULIN (HCC): ICD-10-CM

## 2024-09-11 PROCEDURE — 99214 OFFICE O/P EST MOD 30 MIN: CPT

## 2024-09-11 RX ORDER — PROPRANOLOL HYDROCHLORIDE 10 MG/1
10 TABLET ORAL 3 TIMES DAILY PRN
Qty: 90 TABLET | Refills: 0 | Status: SHIPPED | OUTPATIENT
Start: 2024-09-11

## 2024-09-11 NOTE — PROGRESS NOTES
Hermila Solorio is a 64 year old female.  Chief Complaint   Patient presents with    Follow - Up     Strong headache, dizziness. Went to ED, was told to see neurologist. Was prescribed medication by neuro that helped but started having anxiety, can't sleep.    Anxiety     Anxiety post neurology appt.     HPI:   Hermila Solorio presented to the clinic for follow up emergency room 8/14/2024 for non-intractable, new onset headaches.  Patient followed up with neurology-Dr. Antoine 8/22/2024.  Patient states consistent headaches for 1 month.  Associated dizziness.  Last headache greater than 2 weeks ago.  Patient was advised MRI of the brain and CTA of the head and neck.  MRI of the brain scheduled for end of September.  .  Was started on prednisone 20 mg, sumatriptan.  Advise lifestyle intervention. Needs to schedule follow-up with neurology    Headaches triggering anxiety.  Does not want to drive or leave the house due to fear of her health.  Associating feelings of doom, racing heart, sweating hands.      Current Outpatient Medications   Medication Sig Dispense Refill    SUMAtriptan Succinate 100 MG Oral Tab Use at onset; repeat once after 2 HRS-ONLY 2 IN 24 HR MAX.  This is a 30 day supply. 9 tablet 11    metFORMIN  MG Oral Tablet 24 Hr Take 1 tablet (500 mg total) by mouth daily. 90 tablet 3    atorvastatin 10 MG Oral Tab Take 1 tablet (10 mg total) by mouth nightly. 90 tablet 3    butalbital-acetaminophen-caffeine -40 MG Oral Tab Take 1-2 tablets by mouth every 4 (four) hours as needed for Headaches. (Patient not taking: Reported on 8/22/2024) 20 tablet 0    fexofenadine 180 MG Oral Tab Take 1 tablet (180 mg total) by mouth daily. (Patient not taking: Reported on 9/11/2024) 90 tablet 3    Fluocinolone Acetonide Scalp 0.01 % External Oil Use twice weekly, apply to affected areas and leave on scalp for at least 6 hours before rinsing. (Patient not taking: Reported on 9/11/2024) 118 mL 0      Past Medical  History:    Biliary colic    acute    Cholelithiasis    Diabetes (HCC)    not taking medicines    Ectopic pregnancy (HCC)    Migraines      Past Surgical History:   Procedure Laterality Date    Breast biopsy Left 02/18/2021    Left Breast Microductectomy    Cholecystectomy      Colonoscopy  2009    Colonoscopy N/A 08/18/2020    Procedure: COLONOSCOPY;  Surgeon: Desmond Valdez MD;  Location: [facility] ENDOSCOPY    Colonoscopy N/A 10/31/2023    Procedure: COLONOSCOPY;  Surgeon: Desmond Valdez MD;  Location: [facility] ENDOSCOPY    Electrocardiogram, complete  05/20/2014    scanned to media tab    Laparoscopy w cholangiogram  2014    Laparoscopic cholecystectomy w/ intraoperative cholangiogram    Needle biopsy left      Upper gi endoscopy,biopsy  2015      Social History:  Social History     Socioeconomic History    Marital status:    Tobacco Use    Smoking status: Never    Smokeless tobacco: Never   Vaping Use    Vaping status: Never Used   Substance and Sexual Activity    Alcohol use: No     Alcohol/week: 0.0 standard drinks of alcohol    Drug use: No    Sexual activity: Yes   Other Topics Concern    Caffeine Concern Yes     Comment: coffee, 24 oz./day    Exercise Yes     Comment: walk 2 times daily      Family History   Problem Relation Age of Onset    Hypertension Mother     Arthritis Mother         rheumatoid    Psoriasis Mother     Diabetes Father     Diabetes Brother         per NG: \"four brothers have diabetes\"    Cancer Paternal Aunt         lung    Breast Cancer Paternal Cousin Female         40s    Ovarian Cancer Maternal Cousin Female 50      No Known Allergies     REVIEW OF SYSTEMS:   Review of Systems   Constitutional:  Negative for activity change.   Respiratory:  Negative for chest tightness and shortness of breath.    Cardiovascular:  Negative for chest pain and palpitations.   Neurological:  Positive for dizziness (improved) and headaches (improved).   Psychiatric/Behavioral:  Positive  for behavioral problems and sleep disturbance. Negative for self-injury and suicidal ideas. The patient is nervous/anxious.    All other systems reviewed and are negative.     Wt Readings from Last 5 Encounters:   09/11/24 169 lb (76.7 kg)   08/14/24 168 lb (76.2 kg)   07/24/24 169 lb (76.7 kg)   05/20/24 169 lb (76.7 kg)   05/01/24 166 lb (75.3 kg)     Body mass index is 30.91 kg/m².      EXAM:   /74 (BP Location: Right arm, Patient Position: Sitting, Cuff Size: adult)   Pulse 83   Temp 97.3 °F (36.3 °C)   Ht 5' 2\" (1.575 m)   Wt 169 lb (76.7 kg)   SpO2 98%   BMI 30.91 kg/m²   Physical Exam  Vitals reviewed.   Constitutional:       Appearance: Normal appearance.   HENT:      Head: Normocephalic and atraumatic.   Pulmonary:      Effort: Pulmonary effort is normal.   Neurological:      General: No focal deficit present.      Mental Status: She is alert and oriented to person, place, and time.   Psychiatric:         Mood and Affect: Mood normal.         Behavior: Behavior normal.            ASSESSMENT AND PLAN:   (R51.9) New onset of headaches after age 50  (primary encounter diagnosis)  Plan: Patient following up ER visit for new onset headaches.  Reviewed note. Had follow-up with neurology-Dr. Antoine.  Reviewed note.  Patient started on prednisone, sumatriptan.  States no headache in the last 2 weeks.  Had CTA completed.  Scheduled MRI of the brain.  Needs to schedule 1 month follow-up with Dr. Antoine.  Reinforced lifestyle management.  Patient states new onset headaches triggering anxiety (see below).      (F41.1) Generalized anxiety disorder  Plan: Patient with new onset anxiety.  Feels impending doom related to new health concerns.  Difficulty leaving the house, running errands, driving.  Associated racing thoughts, poor sleep, racing heart rate.  Patient interested in as needed pharmacological management.  Prescription for propranolol 3 times daily as needed to pharmacy.  Discussed side effects.   Advised to follow-up if no improvement.    Follow-up as needed.    The patient indicates understanding of these issues and agrees to the plan.  Chaperone offered to the patient prior to examination    This note was prepared using Dragon Medical voice recognition dictation software. As a result errors may occur. When identified these errors have been corrected. While every attempt is made to correct errors during dictation discrepancies may still exist.

## 2024-09-26 ENCOUNTER — HOSPITAL ENCOUNTER (OUTPATIENT)
Dept: MRI IMAGING | Facility: HOSPITAL | Age: 64
Discharge: HOME | End: 2024-09-26
Attending: Other
Payer: MEDICARE

## 2024-09-26 DIAGNOSIS — R51.9 NEW ONSET OF HEADACHES AFTER AGE 50: ICD-10-CM

## 2024-09-26 PROCEDURE — 70551 MRI BRAIN STEM W/O DYE: CPT | Performed by: OTHER

## 2024-10-01 ENCOUNTER — TELEPHONE (OUTPATIENT)
Dept: NEUROLOGY | Facility: CLINIC | Age: 64
End: 2024-10-01

## 2024-10-11 NOTE — PATIENT INSTRUCTIONS
[facility] Peds/Adult Note                       Date: 2024    Name: Carlos Wright III    MRN: [id number]         : 1954    1345 Patient arrives for dalvance without acute problems. Please see Epic for complete assessment and education provided.        Vital signs stable throughout and prior to discharge. Patient tolerated procedure well and was discharged without incident.  No future apts.       Mr. Wright's vitals were reviewed prior to and after treatment.   Patient Vitals for the past 12 hrs:   Temp Pulse Resp BP SpO2   10/11/24 1524 -- 66 20 131/68 --   10/11/24 1345 98.2 °F (36.8 °C) 77 20 (!) 151/77 93 %         Lab results were obtained and reviewed.  No results found for this or any previous visit (from the past 12 hour(s)).    Medications given:   Medications Administered         dalbavancin (DALVANCE) 1,500 mg in dextrose 5 % 250 mL IVPB Admin Date  10/11/2024 Action  New Bag Dose  1,500 mg Rate  700 mL/hr Route  IntraVENous Documented By  Faye Quesada RN        dextrose 5 % solution Admin Date  10/11/2024 Action  New Bag Dose   Rate  50 mL/hr Route  IntraVENous Documented By  Faye Quesada, TAD              Mr. Wright tolerated the infusion, and had no complaints.    [facility].     No future appointments.    Faye Quesada RN  2024  3:26 PM     Schedule colonoscopy exam at [facility])/ [facility]    This patient IS  appropriate for the [facility] endoscopy center. Body mass index is 29.23 kg/m².     MAC anesthesia     Golytely (PEG) 4L bowel prep      DX = Screen

## 2024-12-19 ENCOUNTER — HOSPITAL ENCOUNTER (OUTPATIENT)
Dept: MAMMOGRAPHY | Facility: HOSPITAL | Age: 64
Discharge: HOME | End: 2024-12-19
Attending: FAMILY MEDICINE
Payer: MEDICARE

## 2024-12-19 ENCOUNTER — HOSPITAL ENCOUNTER (OUTPATIENT)
Dept: ULTRASOUND IMAGING | Facility: HOSPITAL | Age: 64
Discharge: HOME | End: 2024-12-19
Attending: FAMILY MEDICINE
Payer: MEDICARE

## 2024-12-19 DIAGNOSIS — N63.20 MASS OF LEFT BREAST, UNSPECIFIED QUADRANT: ICD-10-CM

## 2024-12-19 PROCEDURE — 77061 BREAST TOMOSYNTHESIS UNI: CPT | Performed by: FAMILY MEDICINE

## 2024-12-19 PROCEDURE — 77065 DX MAMMO INCL CAD UNI: CPT | Performed by: FAMILY MEDICINE

## 2024-12-19 PROCEDURE — 76642 ULTRASOUND BREAST LIMITED: CPT | Performed by: FAMILY MEDICINE

## 2025-01-09 ENCOUNTER — OFFICE VISIT (OUTPATIENT)
Dept: FAMILY MEDICINE CLINIC | Facility: CLINIC | Age: 65
End: 2025-01-09

## 2025-01-09 ENCOUNTER — LAB ENCOUNTER (OUTPATIENT)
Dept: LAB | Age: 65
End: 2025-01-09
Attending: FAMILY MEDICINE
Payer: MEDICARE

## 2025-01-09 VITALS
WEIGHT: 169 LBS | OXYGEN SATURATION: 97 % | SYSTOLIC BLOOD PRESSURE: 125 MMHG | DIASTOLIC BLOOD PRESSURE: 85 MMHG | TEMPERATURE: 98 F | BODY MASS INDEX: 31 KG/M2 | RESPIRATION RATE: 16 BRPM | HEART RATE: 87 BPM

## 2025-01-09 DIAGNOSIS — Z00.00 ROUTINE HEALTH MAINTENANCE: Primary | ICD-10-CM

## 2025-01-09 DIAGNOSIS — E11.9 TYPE 2 DIABETES MELLITUS WITHOUT COMPLICATION, WITHOUT LONG-TERM CURRENT USE OF INSULIN (HCC): ICD-10-CM

## 2025-01-09 LAB
ALBUMIN SERPL-MCNC: 4.8 G/DL (ref 3.2–4.8)
ALBUMIN/GLOB SERPL: 1.8 {RATIO} (ref 1–2)
ALP LIVER SERPL-CCNC: 102 U/L
ALT SERPL-CCNC: 38 U/L
ANION GAP SERPL CALC-SCNC: 7 MMOL/L (ref 0–18)
AST SERPL-CCNC: 35 U/L (ref ?–34)
BILIRUB SERPL-MCNC: 0.3 MG/DL (ref 0.2–1.1)
BUN BLD-MCNC: 11 MG/DL (ref 9–23)
BUN/CREAT SERPL: 12.4 (ref 10–20)
CALCIUM BLD-MCNC: 10.8 MG/DL (ref 8.7–10.4)
CHLORIDE SERPL-SCNC: 103 MMOL/L (ref 98–112)
CHOLEST SERPL-MCNC: 162 MG/DL (ref ?–200)
CO2 SERPL-SCNC: 26 MMOL/L (ref 21–32)
CREAT BLD-MCNC: 0.89 MG/DL
CREAT UR-SCNC: 21.3 MG/DL
EGFRCR SERPLBLD CKD-EPI 2021: 72 ML/MIN/1.73M2 (ref 60–?)
FASTING PATIENT LIPID ANSWER: YES
FASTING STATUS PATIENT QL REPORTED: YES
GLOBULIN PLAS-MCNC: 2.6 G/DL (ref 2–3.5)
GLUCOSE BLD-MCNC: 138 MG/DL (ref 70–99)
HDLC SERPL-MCNC: 46 MG/DL (ref 40–59)
HEMOGLOBIN A1C: 7.5 % (ref 4.3–5.6)
LDLC SERPL CALC-MCNC: 93 MG/DL (ref ?–100)
MICROALBUMIN UR-MCNC: <0.3 MG/DL
NONHDLC SERPL-MCNC: 116 MG/DL (ref ?–130)
OSMOLALITY SERPL CALC.SUM OF ELEC: 284 MOSM/KG (ref 275–295)
POTASSIUM SERPL-SCNC: 4.1 MMOL/L (ref 3.5–5.1)
PROT SERPL-MCNC: 7.4 G/DL (ref 5.7–8.2)
SODIUM SERPL-SCNC: 136 MMOL/L (ref 136–145)
TRIGL SERPL-MCNC: 128 MG/DL (ref 30–149)
VLDLC SERPL CALC-MCNC: 21 MG/DL (ref 0–30)

## 2025-01-09 PROCEDURE — 80053 COMPREHEN METABOLIC PANEL: CPT | Performed by: FAMILY MEDICINE

## 2025-01-09 PROCEDURE — 82043 UR ALBUMIN QUANTITATIVE: CPT | Performed by: FAMILY MEDICINE

## 2025-01-09 PROCEDURE — 36415 COLL VENOUS BLD VENIPUNCTURE: CPT | Performed by: FAMILY MEDICINE

## 2025-01-09 PROCEDURE — 80061 LIPID PANEL: CPT | Performed by: FAMILY MEDICINE

## 2025-01-09 PROCEDURE — 82570 ASSAY OF URINE CREATININE: CPT | Performed by: FAMILY MEDICINE

## 2025-01-09 RX ORDER — GLIMEPIRIDE 2 MG/1
2 TABLET ORAL
Qty: 30 TABLET | Refills: 0 | Status: SHIPPED | OUTPATIENT
Start: 2025-01-09

## 2025-01-09 NOTE — PROGRESS NOTES
Hermila Solorio is a 64 year old female.  Chief Complaint   Patient presents with    Follow - Up     Pt is here for follow up on diabetes       HPI:   Patient is a 64-year-old female here for annual physical exam.  And check of her type 2 diabetes.  Hemoglobin A1c 7.5 today up from 7.0 patient sees Dr. Ortega for eye exams yearly    Current Outpatient Medications   Medication Sig Dispense Refill    glimepiride 2 MG Oral Tab Take 1 tablet (2 mg total) by mouth every morning before breakfast. 30 tablet 0    SUMAtriptan Succinate 100 MG Oral Tab Use at onset; repeat once after 2 HRS-ONLY 2 IN 24 HR MAX.  This is a 30 day supply. 9 tablet 11    Fluocinolone Acetonide Scalp 0.01 % External Oil Use twice weekly, apply to affected areas and leave on scalp for at least 6 hours before rinsing. 118 mL 0    metFORMIN  MG Oral Tablet 24 Hr Take 1 tablet (500 mg total) by mouth daily. 90 tablet 3    atorvastatin 10 MG Oral Tab Take 1 tablet (10 mg total) by mouth nightly. 90 tablet 3      Past Medical History:    Biliary colic    acute    Cholelithiasis    Diabetes (HCC)    not taking medicines    Ectopic pregnancy (HCC)    Migraines      Past Surgical History:   Procedure Laterality Date    Breast biopsy Left 02/18/2021    Left Breast Microductectomy    Cholecystectomy      Colonoscopy  2009    Colonoscopy N/A 08/18/2020    Procedure: COLONOSCOPY;  Surgeon: Desmond Valdez MD;  Location: [facility] ENDOSCOPY    Colonoscopy N/A 10/31/2023    Procedure: COLONOSCOPY;  Surgeon: Desmond Valdez MD;  Location: [facility] ENDOSCOPY    Electrocardiogram, complete  05/20/2014    scanned to media tab    Laparoscopy w cholangiogram  2014    Laparoscopic cholecystectomy w/ intraoperative cholangiogram    Needle biopsy left      Upper gi endoscopy,biopsy  2015      Social History:  Social History     Socioeconomic History    Marital status:    Tobacco Use    Smoking status: Never    Smokeless tobacco: Never   Vaping Use     Vaping status: Never Used   Substance and Sexual Activity    Alcohol use: No     Alcohol/week: 0.0 standard drinks of alcohol    Drug use: No    Sexual activity: Yes   Other Topics Concern    Caffeine Concern Yes     Comment: coffee, 24 oz./day    Exercise Yes     Comment: walk 2 times daily        REVIEW OF SYSTEMS:   GENERAL HEALTH: No fevers, chills, sweats, fatigue  VISION: No recent vision problems, blurry vision or double vision  HEENT: No decreased hearing ear pain nasal congestion or sore throat  SKIN: denies any unusual skin lesions or rashes  RESPIRATORY: denies shortness of breath, cough, wheezing  CARDIOVASCULAR: denies chest pain on exertion, palpitations, swelling in feet  GI: denies abdominal pain and denies heartburn, nausea or vomiting  : No Pain on urination, change in the color of urine, discharge, urinating frequently  MUS: No back pain, joint pain, muscle pain  NEURO: denies headaches , anxiety, depression    EXAM:   /85 (BP Location: Left arm, Patient Position: Sitting, Cuff Size: adult)   Pulse 87   Temp 98 °F (36.7 °C) (Temporal)   Resp 16   Wt 169 lb (76.7 kg)   SpO2 97%   BMI 30.91 kg/m²   GENERAL: well developed, well nourished,in no apparent distress  SKIN: no rashes,no suspicious lesions  HEENT: atraumatic, normocephalic,ears and throat are clear,   NECK: supple,no adenopathy,  LUNGS: clear to auscultation, no wheeze  CARDIO: RRR without murmur  GI: good BS's,no masses or tenderness  EXTREMITIES: no cyanosis, or edema    ASSESSMENT AND PLAN:   1. Type 2 diabetes mellitus without complication, without long-term current use of insulin ([facility])  Hemoglobin A1c increased to 7.5 today.  Patient states she does not want to increase her metformin and that she has side effects from it.  She is willing to try Amaryl 2 mg will take daily.  Discussed with patient we will repeat again in 6 months.  Labs today discussed diet with patient  - POC Glycohemoglobin [69390]  - Microalb/Creat  Ratio, Random Urine [E]  - glimepiride 2 MG Oral Tab; Take 1 tablet (2 mg total) by mouth every morning before breakfast.  Dispense: 30 tablet; Refill: 0  - COMP METABOLIC PANEL [63352] [Q]  - LIPID PANEL [7600] [Q]    2. Routine health maintenance  Discussed diet and exercise today physical exam performed.       The patient indicates understanding of these issues and agrees to the plan.  No follow-ups on file.

## 2025-03-31 ENCOUNTER — NURSE TRIAGE (OUTPATIENT)
Dept: FAMILY MEDICINE CLINIC | Facility: CLINIC | Age: 65
End: 2025-03-31

## 2025-03-31 ENCOUNTER — LAB ENCOUNTER (OUTPATIENT)
Dept: LAB | Facility: REFERENCE LAB | Age: 65
End: 2025-03-31
Attending: FAMILY MEDICINE
Payer: MEDICARE

## 2025-03-31 ENCOUNTER — OFFICE VISIT (OUTPATIENT)
Facility: CLINIC | Age: 65
End: 2025-03-31
Payer: MEDICARE

## 2025-03-31 VITALS
OXYGEN SATURATION: 99 % | BODY MASS INDEX: 30.55 KG/M2 | DIASTOLIC BLOOD PRESSURE: 84 MMHG | SYSTOLIC BLOOD PRESSURE: 122 MMHG | HEIGHT: 62 IN | HEART RATE: 78 BPM | WEIGHT: 166 LBS

## 2025-03-31 DIAGNOSIS — R10.84 GENERALIZED ABDOMINAL PAIN: ICD-10-CM

## 2025-03-31 DIAGNOSIS — R10.13 DYSPEPSIA: Primary | ICD-10-CM

## 2025-03-31 LAB
ALBUMIN SERPL-MCNC: 4.8 G/DL (ref 3.2–4.8)
ALBUMIN/GLOB SERPL: 1.8 {RATIO} (ref 1–2)
ALP LIVER SERPL-CCNC: 101 U/L
ALT SERPL-CCNC: 35 U/L
ANION GAP SERPL CALC-SCNC: 8 MMOL/L (ref 0–18)
AST SERPL-CCNC: 32 U/L (ref ?–34)
BASOPHILS # BLD AUTO: 0.06 X10(3) UL (ref 0–0.2)
BASOPHILS NFR BLD AUTO: 1.2 %
BILIRUB SERPL-MCNC: 0.4 MG/DL (ref 0.2–1.1)
BUN BLD-MCNC: 9 MG/DL (ref 9–23)
BUN/CREAT SERPL: 8.9 (ref 10–20)
CALCIUM BLD-MCNC: 10.1 MG/DL (ref 8.7–10.4)
CHLORIDE SERPL-SCNC: 104 MMOL/L (ref 98–112)
CO2 SERPL-SCNC: 26 MMOL/L (ref 21–32)
CREAT BLD-MCNC: 1.01 MG/DL
DEPRECATED RDW RBC AUTO: 42.5 FL (ref 35.1–46.3)
EGFRCR SERPLBLD CKD-EPI 2021: 61 ML/MIN/1.73M2 (ref 60–?)
EOSINOPHIL # BLD AUTO: 0.06 X10(3) UL (ref 0–0.7)
EOSINOPHIL NFR BLD AUTO: 1.2 %
ERYTHROCYTE [DISTWIDTH] IN BLOOD BY AUTOMATED COUNT: 13 % (ref 11–15)
FASTING STATUS PATIENT QL REPORTED: NO
GLOBULIN PLAS-MCNC: 2.6 G/DL (ref 2–3.5)
GLUCOSE BLD-MCNC: 216 MG/DL (ref 70–99)
HCT VFR BLD AUTO: 43.5 %
HGB BLD-MCNC: 14.8 G/DL
IMM GRANULOCYTES # BLD AUTO: 0.02 X10(3) UL (ref 0–1)
IMM GRANULOCYTES NFR BLD: 0.4 %
LIPASE SERPL-CCNC: 45 U/L (ref 12–53)
LYMPHOCYTES # BLD AUTO: 1.35 X10(3) UL (ref 1–4)
LYMPHOCYTES NFR BLD AUTO: 26.3 %
MCH RBC QN AUTO: 30.3 PG (ref 26–34)
MCHC RBC AUTO-ENTMCNC: 34 G/DL (ref 31–37)
MCV RBC AUTO: 89 FL
MONOCYTES # BLD AUTO: 0.44 X10(3) UL (ref 0.1–1)
MONOCYTES NFR BLD AUTO: 8.6 %
NEUTROPHILS # BLD AUTO: 3.21 X10 (3) UL (ref 1.5–7.7)
NEUTROPHILS # BLD AUTO: 3.21 X10(3) UL (ref 1.5–7.7)
NEUTROPHILS NFR BLD AUTO: 62.3 %
OSMOLALITY SERPL CALC.SUM OF ELEC: 291 MOSM/KG (ref 275–295)
PLATELET # BLD AUTO: 261 10(3)UL (ref 150–450)
POTASSIUM SERPL-SCNC: 4.3 MMOL/L (ref 3.5–5.1)
PROT SERPL-MCNC: 7.4 G/DL (ref 5.7–8.2)
RBC # BLD AUTO: 4.89 X10(6)UL
SODIUM SERPL-SCNC: 138 MMOL/L (ref 136–145)
WBC # BLD AUTO: 5.1 X10(3) UL (ref 4–11)

## 2025-03-31 PROCEDURE — 1160F RVW MEDS BY RX/DR IN RCRD: CPT | Performed by: FAMILY MEDICINE

## 2025-03-31 PROCEDURE — 85025 COMPLETE CBC W/AUTO DIFF WBC: CPT | Performed by: FAMILY MEDICINE

## 2025-03-31 PROCEDURE — 3008F BODY MASS INDEX DOCD: CPT | Performed by: FAMILY MEDICINE

## 2025-03-31 PROCEDURE — 99214 OFFICE O/P EST MOD 30 MIN: CPT | Performed by: FAMILY MEDICINE

## 2025-03-31 PROCEDURE — 80053 COMPREHEN METABOLIC PANEL: CPT | Performed by: FAMILY MEDICINE

## 2025-03-31 PROCEDURE — 83690 ASSAY OF LIPASE: CPT | Performed by: FAMILY MEDICINE

## 2025-03-31 PROCEDURE — 3079F DIAST BP 80-89 MM HG: CPT | Performed by: FAMILY MEDICINE

## 2025-03-31 PROCEDURE — 3061F NEG MICROALBUMINURIA REV: CPT | Performed by: FAMILY MEDICINE

## 2025-03-31 PROCEDURE — 1159F MED LIST DOCD IN RCRD: CPT | Performed by: FAMILY MEDICINE

## 2025-03-31 PROCEDURE — 83013 H PYLORI (C-13) BREATH: CPT | Performed by: FAMILY MEDICINE

## 2025-03-31 PROCEDURE — 36415 COLL VENOUS BLD VENIPUNCTURE: CPT | Performed by: FAMILY MEDICINE

## 2025-03-31 PROCEDURE — 3074F SYST BP LT 130 MM HG: CPT | Performed by: FAMILY MEDICINE

## 2025-03-31 RX ORDER — OMEPRAZOLE 40 MG/1
40 CAPSULE, DELAYED RELEASE ORAL DAILY
Qty: 30 CAPSULE | Refills: 0 | Status: SHIPPED | OUTPATIENT
Start: 2025-03-31

## 2025-03-31 NOTE — PROGRESS NOTES
CC:    Chief Complaint   Patient presents with    Abdominal Pain     Abdominal burning in her upper abdomen. Lots of discomfort       HPI: 65 year old female here with acute abdominal discomfort and burning.  Reports for the past five days she developed severe heartburn that feels like burning with radiation to the right upper quadrant.  It now feels like it is all over her abdomen, and feels pinching in the lower abdomen.  The pain is in the back and on the sides of her abdomen.   She has a history of acid reflux, but this is more severe and the pain in the lower abdomen is different.  She was in [place] one month ago, but no other recent travel.  She had her gallbladder removed, and tries to limit greasy foods.  Does drink two cups of coffee a day and eats some spicy foods.  She does not eat red sauce, but does eat tomatoes.  Does not smoke or drink alcohol.   She also feels very bloated, and over the past five days she has been eating smaller portions because of it.   Does report having H. Pylori years ago.   Took OTC Omeprazole last night with mild improvement of the sour taste and acid in her mouth.   Denies any NSAID or Aspirin intake recently.   The pain will get worse when she is hungry and she does not eat.     ROS:  General:  No fevers/chills, no fatigue, no weight changes  HEENT:  Denies congestion or nasal discharge, no dysphagia or odynophagia   Cardio:  No chest pain  Pulmonary:  No cough, no SOB  GI:  Heartburn and abdominal discomfort with bloating, nausea but no vomiting, no diarrhea, no constipation, no melena or hematochezia   :  No discharge, no dysuria, no polyuria, no hematuria  Dermatologic:  No rashes    Past Medical History:    Biliary colic    acute    Cholelithiasis    Diabetes (HCC)    not taking medicines    Ectopic pregnancy (HCC)    Migraines       Social History     Socioeconomic History    Marital status:      Spouse name: Not on file    Number of children: Not on file     Years of education: Not on file    Highest education level: Not on file   Occupational History    Not on file   Tobacco Use    Smoking status: Never    Smokeless tobacco: Never   Vaping Use    Vaping status: Never Used   Substance and Sexual Activity    Alcohol use: No     Alcohol/week: 0.0 standard drinks of alcohol    Drug use: No    Sexual activity: Yes   Other Topics Concern     Service Not Asked    Blood Transfusions Not Asked    Caffeine Concern Yes     Comment: coffee, 24 oz./day    Occupational Exposure Not Asked    Hobby Hazards Not Asked    Sleep Concern Not Asked    Stress Concern Not Asked    Weight Concern Not Asked    Special Diet Not Asked    Back Care Not Asked    Exercise Yes     Comment: walk 2 times daily    Bike Helmet Not Asked    Seat Belt Not Asked    Self-Exams Not Asked   Social History Narrative    Not on file     Social Drivers of Health     Food Insecurity: No Food Insecurity (3/31/2025)    NCSS - Food Insecurity     Worried About Running Out of Food in the Last Year: No     Ran Out of Food in the Last Year: No   Transportation Needs: No Transportation Needs (3/31/2025)    NCSS - Transportation     Lack of Transportation: No   Stress: Not on file   Housing Stability: Not At Risk (3/31/2025)    NCSS - Housing/Utilities     Has Housing: Yes     Worried About Losing Housing: No     Unable to Get Utilities: No       Current Outpatient Medications   Medication Sig Dispense Refill    glimepiride 2 MG Oral Tab Take 1 tablet (2 mg total) by mouth every morning before breakfast. 30 tablet 0    SUMAtriptan Succinate 100 MG Oral Tab Use at onset; repeat once after 2 HRS-ONLY 2 IN 24 HR MAX.  This is a 30 day supply. 9 tablet 11    Fluocinolone Acetonide Scalp 0.01 % External Oil Use twice weekly, apply to affected areas and leave on scalp for at least 6 hours before rinsing. 118 mL 0    metFORMIN  MG Oral Tablet 24 Hr Take 1 tablet (500 mg total) by mouth daily. 90 tablet 3     atorvastatin 10 MG Oral Tab Take 1 tablet (10 mg total) by mouth nightly. (Patient not taking: Reported on 3/31/2025) 90 tablet 3       Patient has no known allergies.      Vitals:   Vitals:    03/31/25 1016   BP: 122/84   Pulse: 78   SpO2: 99%   Weight: 166 lb (75.3 kg)   Height: 5' 2\" (1.575 m)       Body mass index is 30.36 kg/m².    Physical:  General:  Alert, appropriate, no acute distress   Cardio:  RRR, no murmurs, S1, S2  GI:  Soft, hypoactive bowel sounds, epigastric and bilateral lower abdominal tenderness, no masses, no guarding, no rebound, no CVA tenderness   Dermatologic:  No rashes or lesions    Assessment and Plan: 65-year-old female here for evaluation of acute onset heartburn and abdominal pain.    1. Dyspepsia    -Likely due to H. pylori based on recent travel and previous history, and will check H. pylori breath testing today  - Discussed possibility of false negative results since she had one dose of omeprazole yesterday, but will proceed given she only had one dose  - Also provided reflux precautions and will trial Omeprazole 40 mg daily pending results   - H. Pylori Breath Test, Adult (>17) [E]    2. Generalized abdominal pain    - Check labs given periumbilical and lower abdominal pain radiation to the back, and reviewed warning signs and ED precautions   - Lipase [E]  - Comp Metabolic Panel (14) [E]  - CBC W Differential W Platelet [E]      Diamond Harden DO  03/31/25  10:20 AM

## 2025-03-31 NOTE — TELEPHONE ENCOUNTER
Action Requested: Summary for Provider     []  Critical Lab, Recommendations Needed  [] Need Additional Advice  [x]   FYI    []   Need Orders  [] Need Medications Sent to Pharmacy  []  Other     SUMMARY: Patient stated that for the past 5 days has been feeling burning and bloating in her abdomen. States burning is in the upper abdomen. No chest pain. The bloating is more on the lower abdomen and radiating throughout the abdomen. Constant discomfort, not really pain. Not tender to touch. No longer has a call bladder. Does have an appendix. Having normal bowel movements. Urinating ok. Took omeprazole last night which did help alittle with the burning but not the bloating. No other symptoms. Patient wanted to be seen in [place]. Dr Rosas not in the office and no appointments available at that location. Appointment made for today at 10:30am with Dr Harden in [place]-address provided. Advised patient that can also go to urgent care in [place] if can not wait for appointment. Patient stated that will keep appointment.   Onset: Mar 26, 2025  Reason for Disposition   MILD TO MODERATE constant pain lasting > 2 hours    Protocols used: Abdomen Bloating and Swelling-A-OH

## 2025-04-01 LAB — H PYLORI BREATH TEST: NEGATIVE

## 2025-04-02 DIAGNOSIS — R10.13 DYSPEPSIA: Primary | ICD-10-CM

## 2025-04-16 DIAGNOSIS — E11.9 TYPE 2 DIABETES MELLITUS WITHOUT COMPLICATION, WITHOUT LONG-TERM CURRENT USE OF INSULIN (HCC): ICD-10-CM

## 2025-04-21 RX ORDER — GLIMEPIRIDE 2 MG/1
2 TABLET ORAL
Qty: 90 TABLET | Refills: 3 | Status: SHIPPED | OUTPATIENT
Start: 2025-04-21

## 2025-04-21 NOTE — PROGRESS NOTES
[facility] - Gastroenterology                                                                                                               Reason for consult: f/u    Requesting physician or provider: Chikis Rosas MD    Chief Complaint   Patient presents with    Bloating       HPI:   Hermila Solorio is a 65 year old year-old female with history of biliary colic, cholelithiasis, dm, migraines     she is here today for f/U    Has had symptoms of heartburn, bloating, nausea, upper abd pain  No identifiable triggers  Tries to avoid spicy and uses pantoprazole as needed and does seem to help  Sx following travel to [place] 2 mos ago  H/o mild occasional gerd, but additional sx new and do not seem to be going away    Hpylori neg 3/2025 - ON ppi    she moves her bowels daily and without recent change. she denies straining and/or incomplete evacuation.  she denies brbpr and/or melena.    She denies dysphagia, odynophagia and/or globus. Has luq pain at times. Is burning pain. Feels nausea with pain. No vomiting.  she denies recent change in appetite and/or unintentional weight loss.    Ccy 8 y ago approx    NSAIDS: rarely  Tobacco: no  Alcohol: no  Marijuana: no  Illicit drugs: no     No FHx GI malignancy     No history of adverse reaction to sedation  No KENYETTA  No anticoagulants  No pacemaker/defibrillator  No pain medications and/or sleep aides     Colonoscopy: 2009 reportedly unremarkable    C-scope 2020 w/ Dr. Valdez  5 polyps removed  PATH pre-cancerous type     She was given a 5 y crc screening interval    Last colonoscopy w/ Dr. Valdez 10/2023       COLONOSCOPY FINDINGS:    Sessile 6-8 mm colon polyp with a mucoid cap identified and removed by cold snare polypectomy, piecemeal polypectomy technique from the proximal transverse colon.  Photographs taken.  Sessile spherical 6 mm polyp immediately across from the above; also  removed from the proximal transverse colon by cold snare polypectomy, suctioned out.  Sessile 4 mm polyp removed from the mid sigmoid colon by cold snare polypectomy, suctioned out.  Mild descending and sigmoid colon diverticulosis.  Small internal hemorrhoids.     RECOMMENDATIONS:  High fiber diet.  Follow-up above colon polyp pathology results.  Repeat colonoscopy examination in 3 - 5 years.  No aspirin or NSAID medications for next 5 days to prevent bleeding       Final Diagnosis:      A. Transverse colon polyp:  Sessile serrated adenoma.  Tubular adenoma.     B. Sigmoid colon polyp:  Tubular adenoma.     EGD w/ biopsies April 25, 2015.  No H. pylori.    Wt Readings from Last 6 Encounters:   04/24/25 167 lb (75.8 kg)   03/31/25 166 lb (75.3 kg)   01/09/25 169 lb (76.7 kg)   09/11/24 169 lb (76.7 kg)   08/14/24 168 lb (76.2 kg)   07/24/24 169 lb (76.7 kg)        History, Medications, Allergies, ROS:      Past Medical History[1]   Past Surgical History[2]   Family Hx: Family History[3]   Social History: Short Social Hx on File[4]     Medications (Active prior to today's visit):  Current Medications[5]    Allergies:  Allergies[6]    ROS:   CONSTITUTIONAL: negative for fevers, chills, sweats and weight loss  EYES Negative for red eyes, yellow eyes, changes in vision  HEENT: Negative for dysphagia and hoarseness  RESPIRATORY: Negative for cough and shortness of breath  CARDIOVASCULAR: Negative for chest pain, palpitations  GASTROINTESTINAL: See HPI  GENITOURINARY: Negative for dysuria and frequency  MUSCULOSKELETAL: Negative for arthralgias and myalgias  NEUROLOGICAL: Negative for dizziness and headaches  BEHAVIOR/PSYCH: Negative for anxiety and poor appetite    PHYSICAL EXAM:   Blood pressure 127/80, pulse 80, height 5' 2\" (1.575 m), weight 167 lb (75.8 kg), not currently breastfeeding.    GEN: WD/WN, NAD  HEENT: Supple symmetrical, trachea midline  CV: RRR, the extremities are warm and well perfused   LUNGS: No  increased work of breathing  ABDOMEN: No scars, normal bowel sounds, soft, non-tender, non-distended no rebound or guarding, no masses, no hepatomegaly  MSK: No redness, no warmth, no swelling of joints  SKIN: No jaundice, no erythema, no rashes  HEMATOLOGIC: No bleeding, no bruising  NEURO: Alert and interactive, normal gait    Labs/Imaging/Procedures:     Patient's pertinent labs and imaging were reviewed and discussed with patient today.        .  ASSESSMENT/PLAN:   Hermila Solorio is a 65 year old year-old female with history of biliary colic, cholelithiasis, dm, migraines     #crc screening  Due 10/2026 unless otherwise indicated    #luq pain  #bloating  #heartburn   sx intermittently. Avoids spicy food and uses ppi prn w/ improvement.  Hpylori testing neg (on ppi). Egd 2015 neg.  No dysphagia, unintentional weight loss. H/o ccy 8 y ago. No fhx gi malignancy. Plan for upper endoscopy (new onset dyspeptic sx in pt  >60) Plan as below.     -c-scope 10/2026  -reflux diet modification  -omeprazole 40 mg/daily x 6-8 weeks  -avoid nsaids  -consider ct    Egd w/ mac w/ Dr. Valdez  Dx: luq pain, bloating, heartburn  Hold glimepiride, metformin am of procedure    Orders This Visit:  No orders of the defined types were placed in this encounter.      Meds This Visit:  Requested Prescriptions      No prescriptions requested or ordered in this encounter       Imaging & Referrals:  None    ENDOSCOPIC RISK BENEFIT DISCUSSION: I described the procedure in great detail with the patient. I discussed the risks and benefits, including but not limited to: bleeding, perforation, infection, anesthesia complications, and even death. Patient will be NPO after midnight and will have a person physically present at time of pick-up to drive patient home. Patient verbalized understanding and agrees to proceed with procedure as planned.    Leann Rubin, APRN   4/21/2025        This note was partially prepared using [employer]  recognition dictation software. As a result, errors may occur. When identified, these errors have been corrected. While every attempt is made to correct errors during dictation, discrepancies may still exist.          [1]   Past Medical History:   Biliary colic    acute    Cholelithiasis    Diabetes (HCC)    not taking medicines    Ectopic pregnancy (HCC)    Migraines   [2]   Past Surgical History:  Procedure Laterality Date    Breast biopsy Left 02/18/2021    Left Breast Microductectomy    Cholecystectomy      Colonoscopy  2009    Colonoscopy N/A 08/18/2020    Procedure: COLONOSCOPY;  Surgeon: Desmond Valdez MD;  Location[facility] ENDOSCOPY    Colonoscopy N/A 10/31/2023    Procedure: COLONOSCOPY;  Surgeon: Desmond Valdez MD;  Location: [facility] ENDOSCOPY    Electrocardiogram, complete  05/20/2014    scanned to media tab    Laparoscopy w cholangiogram  2014    Laparoscopic cholecystectomy w/ intraoperative cholangiogram    Needle biopsy left      Upper gi endoscopy,biopsy  2015   [3]   Family History  Problem Relation Age of Onset    Diabetes Father     Hypertension Mother     Arthritis Mother         rheumatoid    Psoriasis Mother     Diabetes Brother         per NG: \"four brothers have diabetes\"    Cancer Paternal Aunt         lung    Ovarian Cancer Maternal Cousin Female 50    Breast Cancer Paternal Cousin Female         40s    Colon Cancer Neg    [4]   Social History  Socioeconomic History    Marital status:    Tobacco Use    Smoking status: Never    Smokeless tobacco: Never   Vaping Use    Vaping status: Never Used   Substance and Sexual Activity    Alcohol use: No     Alcohol/week: 0.0 standard drinks of alcohol    Drug use: No    Sexual activity: Yes   Other Topics Concern    Caffeine Concern Yes     Comment: coffee, 24 oz./day    Exercise Yes     Comment: walk 2 times daily     Social Drivers of Health     Food Insecurity: No Food Insecurity (3/31/2025)    NCSS - Food Insecurity      Worried About Running Out of Food in the Last Year: No     Ran Out of Food in the Last Year: No   Transportation Needs: No Transportation Needs (3/31/2025)    NCSS - Transportation     Lack of Transportation: No   Housing Stability: Not At Risk (3/31/2025)    NCSS - Housing/Utilities     Has Housing: Yes     Worried About Losing Housing: No     Unable to Get Utilities: No   [5]   Current Outpatient Medications   Medication Sig Dispense Refill    GLIMEPIRIDE 2 MG Oral Tab TAKE 1 TABLET(2 MG) BY MOUTH EVERY MORNING BEFORE BREAKFAST 90 tablet 3    Omeprazole 40 MG Oral Capsule Delayed Release Take 1 capsule (40 mg total) by mouth daily. 30 capsule 0    metFORMIN  MG Oral Tablet 24 Hr Take 1 tablet (500 mg total) by mouth daily. 90 tablet 3    atorvastatin 10 MG Oral Tab Take 1 tablet (10 mg total) by mouth nightly. 90 tablet 3    SUMAtriptan Succinate 100 MG Oral Tab Use at onset; repeat once after 2 HRS-ONLY 2 IN 24 HR MAX.  This is a 30 day supply. (Patient not taking: Reported on 4/24/2025) 9 tablet 11    Fluocinolone Acetonide Scalp 0.01 % External Oil Use twice weekly, apply to affected areas and leave on scalp for at least 6 hours before rinsing. (Patient not taking: Reported on 4/24/2025) 118 mL 0   [6] No Known Allergies

## 2025-04-24 ENCOUNTER — OFFICE VISIT (OUTPATIENT)
Dept: GASTROENTEROLOGY | Facility: CLINIC | Age: 65
End: 2025-04-24

## 2025-04-24 ENCOUNTER — TELEPHONE (OUTPATIENT)
Facility: CLINIC | Age: 65
End: 2025-04-24

## 2025-04-24 VITALS
HEIGHT: 62 IN | BODY MASS INDEX: 30.73 KG/M2 | DIASTOLIC BLOOD PRESSURE: 80 MMHG | WEIGHT: 167 LBS | HEART RATE: 80 BPM | SYSTOLIC BLOOD PRESSURE: 127 MMHG

## 2025-04-24 DIAGNOSIS — R12 HEARTBURN: ICD-10-CM

## 2025-04-24 DIAGNOSIS — R10.12 LUQ PAIN: ICD-10-CM

## 2025-04-24 DIAGNOSIS — R14.0 BLOATING: ICD-10-CM

## 2025-04-24 DIAGNOSIS — Z12.11 COLON CANCER SCREENING: Primary | ICD-10-CM

## 2025-04-24 DIAGNOSIS — R10.12 LUQ PAIN: Primary | ICD-10-CM

## 2025-04-24 PROCEDURE — 3079F DIAST BP 80-89 MM HG: CPT | Performed by: NURSE PRACTITIONER

## 2025-04-24 PROCEDURE — 1126F AMNT PAIN NOTED NONE PRSNT: CPT | Performed by: NURSE PRACTITIONER

## 2025-04-24 PROCEDURE — 3074F SYST BP LT 130 MM HG: CPT | Performed by: NURSE PRACTITIONER

## 2025-04-24 PROCEDURE — 1159F MED LIST DOCD IN RCRD: CPT | Performed by: NURSE PRACTITIONER

## 2025-04-24 PROCEDURE — 1160F RVW MEDS BY RX/DR IN RCRD: CPT | Performed by: NURSE PRACTITIONER

## 2025-04-24 PROCEDURE — 99215 OFFICE O/P EST HI 40 MIN: CPT | Performed by: NURSE PRACTITIONER

## 2025-04-24 PROCEDURE — 3008F BODY MASS INDEX DOCD: CPT | Performed by: NURSE PRACTITIONER

## 2025-04-24 NOTE — PATIENT INSTRUCTIONS
-c-scope 10/2026  -reflux diet modification  -omeprazole 40 mg/daily x 6-8 weeks  -avoid nsaids  -consider ct    Egd w/ chacha w/ Dr. Valdez  Dx: luq pain, bloating, heartburn  Hold glimepiride, metformin am of procedure    Tips to Control Acid Reflux    To control acid reflux, you’ll need to make some basic diet and lifestyle changes. The simple steps outlined below may be all you’ll need to ease discomfort.   Watch what you eat  Don't have fatty foods or spicy foods.  Eat fewer acidic foods, such as citrus and tomato-based foods. These can increase symptoms.  Limit drinking alcohol, caffeine, and fizzy beverages. All increase acid reflux.  Try limiting chocolate, peppermint, and spearmint. These can make acid reflux worse in some people.     Watch when you eat  Don't lie down for 3 hours after eating.  Don't snack before going to bed.     Raise your head  Raising your head and upper body by 4 to 6 inches helps limit reflux when you’re lying down. Put blocks under the head of your bed frame or a wedge under your mattress to raise it.   Other changes  Lose weight, if you need to  Don’t exercise near bedtime  Don't wear tight-fitting clothes  Limit aspirin and ibuprofen  Stop smoking     [employer] last reviewed this educational content on 6/1/2019  © 6849-5519 [employer]. [address] [place] [address], [place] [address]. All rights reserved. This information is not intended as a substitute for professional medical care. Always follow your healthcare professional's instructions.

## 2025-04-24 NOTE — TELEPHONE ENCOUNTER
Schedulers- Patient was seen in office today, please call patient to schedule procedure per providers orders below. I reviewed and handed a copy of prep instructions with patient in office as well as medications. Patient is aware of different locations and our providers possibly booking out. No further questions asked.      Egd w/ chacha w/ Dr. Valdez  Dx: luq pain, bloating, heartburn  Hold glimepiride, metformin am of procedure

## 2025-04-28 NOTE — TELEPHONE ENCOUNTER
Scheduled for:  EGD [id number]  Provider Name:    Date:  Wednesday, 07/02/2025  Location:  [facility]  Sedation:  Mac  Time:  2:30 pm (pt is aware Cf will call with arrival time     Prep:  Npo  Meds/Allergies Reconciled?:  Yes    Diagnosis with codes:  luq pain R10.12 bloating R14.0 heartburn R12  Was patient informed to call insurance with codes (Y/N): Yes      Referral sent?:  Referral was sent at the time of electronic surgical scheduling.      [facility] or [facility] notified?:  I sent an electronic request to Endo Scheduling and received a confirmation today.       Medication Orders:  None  Misc Orders:  None     Further instructions given by staff:  I discussed the prep instructions with the patient which she verbally understood and is aware that I will mail the instructions today.

## 2025-06-11 ENCOUNTER — HOSPITAL ENCOUNTER (OUTPATIENT)
Dept: MAMMOGRAPHY | Facility: HOSPITAL | Age: 65
Discharge: HOME | End: 2025-06-11
Attending: FAMILY MEDICINE
Payer: MEDICARE

## 2025-06-11 ENCOUNTER — HOSPITAL ENCOUNTER (OUTPATIENT)
Dept: ULTRASOUND IMAGING | Facility: HOSPITAL | Age: 65
Discharge: HOME | End: 2025-06-11
Attending: FAMILY MEDICINE
Payer: MEDICARE

## 2025-06-11 DIAGNOSIS — R92.8 ABNORMAL MAMMOGRAM: ICD-10-CM

## 2025-06-11 PROCEDURE — 77066 DX MAMMO INCL CAD BI: CPT | Performed by: FAMILY MEDICINE

## 2025-06-11 PROCEDURE — 77062 BREAST TOMOSYNTHESIS BI: CPT | Performed by: FAMILY MEDICINE

## 2025-06-11 PROCEDURE — 76642 ULTRASOUND BREAST LIMITED: CPT | Performed by: FAMILY MEDICINE

## 2025-06-11 NOTE — DISCHARGE INSTRUCTIONS
The Doctor (Radiologist) who performed your procedure was:     Place an ice pack over the biopsy site on top of your bra or on top of the ACE wrap (never apply ice directly over skin) for 10-15 minutes of every hour until bedtime for your comfort and to decrease bleeding.  Keep your sports bra or the ACE wrap (stereotactic and MRI biopsy) in place for 24 hours after your biopsy. This compression decreases bleeding and breast movement for your comfort. Wear a supportive bra for the next couple of days for comfort (sports bra for sleep).   Continue to wear, preferably, a sports bra or good supportive bra for 1 week and take off only to shower.  No baths or showers during the first 24 hours after biopsy. After this time you may take a shower. It's okay if the strips get wet but do not soak them. NO saunas, hot tubs or swimming until steri-strips fall off (approx. 5 days). This prevents infection and allows time for them to completely close and heal.  DO NOT remove the steri-strips. They will fall off in 5 days. If any type of irritation (redness, itching or blisters) develops in the area around the steri-strips, remove them gently. If the steri-strips do not fall off after 5 days, gently remove them. Keep the area clean and dry.  It is normal to have mild discomfort and bruising at the biopsy site.  You may take Tylenol as needed for discomfort, as long as you have no allergies to Tylenol. Do not take aspirin, motrin, ibuprofen or any medication containing NSAID (non-steroidal anti-inflammatory drug) product for 48 hours.  DO NOT participate in strenuous activity (aerobics, heavy lifting, housework, gardening, etc.) 48 hours after your biopsy to prevent bleeding.  You will receive results in 2-3 business days.  If you are having an MRI breast biopsy or an Ultrasound guided breast biopsy, you will be billed for the biopsy and unilateral mammogram separately.  If you have any questions about the procedure or your  results, please contact the Breast Care Coordinator Nurse at [phone].  Notify your ordering physician or primary physician for increased bleeding, pain or fever over 100. Or contact a Radiology Nurse at [phone] between 8am-4pm (after 4pm, your call will be directed to the [place] [facility]).

## 2025-06-11 NOTE — IMAGING NOTE
This nurse introduced self and role of breast coordinator.  Discussed recommended breast biopsy with patient. Pt was recommended by Dr. Decker to have a left  breast ultrasound guided biopsy.  Spouses name is Ten . PT does nt have any children.  Pt is retired Pt has super orders placed and was  provide Friday 6/13/25 checking in at 1015 am [facility].   Pt history discussed as below:  Pt history of biopsy: 2021 exc Bx 2023  left breast        Family history of cancer: yes ovarian maternal  Cousin dx 48   Pt history of breast cancer:no  Hx BCP use:  in her 20's                  HRT use:  no ivf no         Recommedations :                      see [facility] for dictated radiology report   Reviewed pertinent patient history, family history of cancer, and patient medications.     -All herbal supplements, Vitamin E, Fish Oil    -All NSAIDs (Ibuprofen, Motrin, Advil, Aleve, or other antiinflammatory medication)  and Aspirin  81mg currently being taken    not  recommended or prescribed by  your physician  should be held for 5  days prior to biopsy.  Denies usage   -Aspirin 81 mg being taken related to a cardiac condition  or prescribed by your  physician should be held at the  direction of your physician.  Informed patient to call ordering physician for guidelines  denies usage   -Blood thinners/antiplatelet medications (Coumadin, Plavix ect) should be held at the  direction of your physician.  Informed patient to call ordering physician for guidelines denies usage   Reviewed US guided biopsy procedure, as below.  You will be lying on your back, potentially slightly toward one side, for this procedure.  The US technician will use the ultrasound machine to locate the area in question that was seen on your previous breast imaging.  The Radiologist will then inject a local numbing medication into the area. This may burn and sting for several seconds .  Then use a needle to collect cells or tissue from the site.  A  marker, or clip, will then be placed in the biopsied area.  This marker is placed so this biopsy site is able to be accurately located upon future breast imaging.  After the clip is placed, steri strips will be applied to  the biopsy site and should be kept on for 5 days.  Additional mammography films will then be taken to assure correct placement of the placed marker.    Educated the patient they will be awake during this procedure and are able to drive themselves home if they wish.  Educated patient that they should eat breakfast and park in green lot and check in with [facility] .  Educated patient that some soreness  may occur after biopsy.  Discussed use of a supportive bra and ice packs after procedure, to decrease soreness.  Tylenol only for discomfort unless they have an allergy to tylenol .  Discussed with patient no swimming, bathing,  hot tubs or submerging underwater  for 5 days post procedure until the incision is closed and healed.   Educated patient on lifting restrictions - nothing heavier than a gallon of milk for 24-48 hours after the procedure.      Discussed with patient that some soreness and bruising is normal after biopsy but that prolonged or increased pain and bruising should be reported to the ordering physician.   Educated patient that they should bring a sports bra or form fitting bra on day of procedure. Educated patient that this helps with comfort after the biopsy and decrease swelling.  Reviewed results process with patient and shared that pathology results will be available within 2-3 business days of their biopsy.  Discussed results will be communicated by their ordering physician unless otherwise indicated.  Educated patient that once we receive an order from her physician  our radiology secretaries would be calling   to schedule the biopsy.

## 2025-06-13 ENCOUNTER — HOSPITAL ENCOUNTER (OUTPATIENT)
Dept: MAMMOGRAPHY | Facility: HOSPITAL | Age: 65
Discharge: HOME | End: 2025-06-13
Attending: FAMILY MEDICINE
Payer: MEDICARE

## 2025-06-13 ENCOUNTER — HOSPITAL ENCOUNTER (OUTPATIENT)
Dept: ULTRASOUND IMAGING | Facility: HOSPITAL | Age: 65
Discharge: HOME | End: 2025-06-13
Attending: FAMILY MEDICINE
Payer: MEDICARE

## 2025-06-13 DIAGNOSIS — R92.8 ABNORMAL MAMMOGRAM: ICD-10-CM

## 2025-06-13 PROCEDURE — 19083 BX BREAST 1ST LESION US IMAG: CPT | Performed by: FAMILY MEDICINE

## 2025-06-13 PROCEDURE — 88342 IMHCHEM/IMCYTCHM 1ST ANTB: CPT | Performed by: FAMILY MEDICINE

## 2025-06-13 PROCEDURE — 77065 DX MAMMO INCL CAD UNI: CPT | Performed by: FAMILY MEDICINE

## 2025-06-13 PROCEDURE — 88360 TUMOR IMMUNOHISTOCHEM/MANUAL: CPT | Performed by: FAMILY MEDICINE

## 2025-06-13 PROCEDURE — 88341 IMHCHEM/IMCYTCHM EA ADD ANTB: CPT | Performed by: FAMILY MEDICINE

## 2025-06-13 PROCEDURE — 88305 TISSUE EXAM BY PATHOLOGIST: CPT | Performed by: FAMILY MEDICINE

## 2025-06-16 ENCOUNTER — TELEPHONE (OUTPATIENT)
Dept: FAMILY MEDICINE CLINIC | Facility: CLINIC | Age: 65
End: 2025-06-16

## 2025-06-16 NOTE — TELEPHONE ENCOUNTER
Dr. Maverick Syed Pathology called, verified patient's Name and . He is calling regarding patient's left breast biopsy result. States pathology came back as \"Solid papillary carcinoma - Intermediate grade.\" Result released.    Dr. Milan (for Dr. Rosas) please advise.

## 2025-06-17 ENCOUNTER — TELEPHONE (OUTPATIENT)
Age: 65
End: 2025-06-17

## 2025-06-17 DIAGNOSIS — C50.919 PRIMARY SOLID PAPILLARY CARCINOMA OF BREAST WITH INVASION (HCC): Primary | ICD-10-CM

## 2025-06-18 ENCOUNTER — TELEPHONE (OUTPATIENT)
Age: 65
End: 2025-06-18

## 2025-06-18 ENCOUNTER — OFFICE VISIT (OUTPATIENT)
Dept: SURGERY | Facility: CLINIC | Age: 65
End: 2025-06-18

## 2025-06-18 VITALS
DIASTOLIC BLOOD PRESSURE: 83 MMHG | SYSTOLIC BLOOD PRESSURE: 135 MMHG | BODY MASS INDEX: 29.77 KG/M2 | HEART RATE: 78 BPM | HEIGHT: 63 IN | WEIGHT: 168 LBS

## 2025-06-18 DIAGNOSIS — C50.112 MALIGNANT NEOPLASM OF CENTRAL PORTION OF LEFT FEMALE BREAST, UNSPECIFIED ESTROGEN RECEPTOR STATUS (HCC): Primary | ICD-10-CM

## 2025-06-18 DIAGNOSIS — C50.919 PRIMARY SOLID PAPILLARY CARCINOMA OF BREAST WITH INVASION (HCC): Primary | ICD-10-CM

## 2025-06-18 PROCEDURE — 3008F BODY MASS INDEX DOCD: CPT | Performed by: SURGERY

## 2025-06-18 PROCEDURE — 1159F MED LIST DOCD IN RCRD: CPT | Performed by: SURGERY

## 2025-06-18 PROCEDURE — 1126F AMNT PAIN NOTED NONE PRSNT: CPT | Performed by: SURGERY

## 2025-06-18 PROCEDURE — 3075F SYST BP GE 130 - 139MM HG: CPT | Performed by: SURGERY

## 2025-06-18 PROCEDURE — 99205 OFFICE O/P NEW HI 60 MIN: CPT | Performed by: SURGERY

## 2025-06-18 PROCEDURE — 1160F RVW MEDS BY RX/DR IN RCRD: CPT | Performed by: SURGERY

## 2025-06-18 PROCEDURE — 3079F DIAST BP 80-89 MM HG: CPT | Performed by: SURGERY

## 2025-06-18 NOTE — PATIENT INSTRUCTIONS
Obtain your breast MRI.  Your other labs are current.    Stop aspirin NSAIDs for 5 days prior to surgery.  Day surgery will call with further instructions.

## 2025-06-18 NOTE — TELEPHONE ENCOUNTER
NN phoned patient and introduced herself. NN explained that patient's case was discussed during Tumor Board Conference. NN informed patient that after reviewing images, the team is recommending additional biopsies. Patient expressed understanding. NN explained that MRI will be completed once biopsies results have been released. NN informed patient that Patience Sands will reach out to coordinate/schedule breast biopsies.     Patient appreciative of the call and encouraged to reach out with questions or concerns.

## 2025-06-18 NOTE — H&P
History and Physical      HPI     Chief Complaint   Patient presents with    Cancer     Pt was referred for left breast CA. She states they had been monitoring a left breast lump for over 1 year, last mammogram show something, then a biopsy was done last week. Path showed Solid papillary carcinoma. Pt does have tenderness to touch after biopsy. She denies pain and drainage.       HPI  Hermila Solorio is a 65 year old female who presents with recently diagnosed left papillary breast cancer.  She is known to me for prior biopsy of the left breast for a papillary lesion.  This was benign.  Additional needle biopsy in 2023 was also benign.  She noted that most recently this mass in the upper outer quadrant was palpable.  Large hematoma after her needle biopsy    Past Medical History[1]  Past Surgical History[2]  Current Medications[3]  ALLERGIES  Allergies[4]    Set as collapsible by default.[5]  Family History[6]    Review of Systems   A comprehensive 10 point review of systems was completed.  Pertinent positives and negatives noted in the the HPI.    PHYSICAL EXAM   /83   Pulse 78   Ht 5' 3\" (1.6 m)   Wt 168 lb (76.2 kg)   BMI 29.76 kg/m²  No LMP recorded. (Menstrual status: Menopause).   Constitutional: appears well hydrated alert and responsive no acute distress noted  Head/Face: normocephalic  Nose/Mouth/Throat: nose and throat are clear palate is intact mucous membranes are moist no oral lesions are noted  Neck/Thyroid: neck is supple without adenopathy  Respiratory: normal to inspection lungs are clear to auscultation bilaterally normal respiratory effort  Breast exam-   Fibroglandular changes on the left breast.  Large hematoma with ecchymoses secondary to needle biopsy.  No suspicious axillary lymphadenopathy        Cardiovascular: regular rate and rhythm no murmurs, gallups, or rubs  Abdomen: soft non-tender non-distended no organomegaly noted no masses  Extremities: no edema, cyanosis, or  clubbing  Neurological: exam appropriate for age reflexes and motor skills appropriate for age      ASSESSMENT/PLAN   Assessment   Encounter Diagnosis   Name Primary?    Malignant neoplasm of central portion of left female breast, unspecified estrogen receptor status (HCC) Yes       65 year old female with left breast papillary cancer 9 mm  We have discussed the surgical risks, benefits, alternatives, and expected recovery. We will plan breast MRI.  Discussed options including breast conserving therapy which she is leaning towards.  After breast MRI if no changes plan needle localization lumpectomy and sentinel lymph node biopsy. All of the patient's questions have been answered to her satisfaction.       6/18/2025  Venkatesh Rosas MD               [1]   Past Medical History:   Biliary colic    acute    Cholelithiasis    Diabetes (HCC)    not taking medicines    Ectopic pregnancy (HCC)    Migraines   [2]   Past Surgical History:  Procedure Laterality Date    Breast biopsy Left 02/18/2021    Left Breast Microductectomy    Cholecystectomy      Colonoscopy  2009    Colonoscopy N/A 08/18/2020    Procedure: COLONOSCOPY;  Surgeon: Desmond Valdez MD;  Location: [facility] ENDOSCOPY    Colonoscopy N/A 10/31/2023    Procedure: COLONOSCOPY;  Surgeon: Desmond Valdez MD;  Location: [facility] ENDOSCOPY    Electrocardiogram, complete  05/20/2014    scanned to media tab    Laparoscopy w cholangiogram  2014    Laparoscopic cholecystectomy w/ intraoperative cholangiogram    Marcia localization wire 1 site left (cpt=19281)  02/22/2021    DR ROSAS    Needle biopsy left      Needle biopsy left  06/13/2025    Upper gi endoscopy,biopsy  2015   [3]   Current Outpatient Medications   Medication Sig Dispense Refill    GLIMEPIRIDE 2 MG Oral Tab TAKE 1 TABLET(2 MG) BY MOUTH EVERY MORNING BEFORE BREAKFAST 90 tablet 3    Omeprazole 40 MG Oral Capsule Delayed Release Take 1 capsule (40 mg total) by mouth daily. 30 capsule 0    SUMAtriptan  Succinate 100 MG Oral Tab Use at onset; repeat once after 2 HRS-ONLY 2 IN 24 HR MAX.  This is a 30 day supply. (Patient not taking: Reported on 4/24/2025) 9 tablet 11    Fluocinolone Acetonide Scalp 0.01 % External Oil Use twice weekly, apply to affected areas and leave on scalp for at least 6 hours before rinsing. (Patient not taking: Reported on 6/18/2025) 118 mL 0    metFORMIN  MG Oral Tablet 24 Hr Take 1 tablet (500 mg total) by mouth daily. 90 tablet 3    atorvastatin 10 MG Oral Tab Take 1 tablet (10 mg total) by mouth nightly. (Patient not taking: Reported on 6/18/2025) 90 tablet 3   [4] No Known Allergies  [5]   Social History  Socioeconomic History    Marital status:    Tobacco Use    Smoking status: Never    Smokeless tobacco: Never   Vaping Use    Vaping status: Never Used   Substance and Sexual Activity    Alcohol use: No     Alcohol/week: 0.0 standard drinks of alcohol    Drug use: No    Sexual activity: Yes   [6]   Family History  Problem Relation Age of Onset    Hypertension Mother     Arthritis Mother         rheumatoid    Psoriasis Mother     Diabetes Father     Diabetes Brother         per NG: \"four brothers have diabetes\"    Cancer Paternal Aunt         lung    Ovarian Cancer Maternal Cousin Female 50    Breast Cancer Paternal Cousin Female         40s    Colon Cancer Neg     Prostate Cancer Neg     Pancreatic Cancer Neg

## 2025-06-19 ENCOUNTER — TELEPHONE (OUTPATIENT)
Dept: ULTRASOUND IMAGING | Facility: HOSPITAL | Age: 65
End: 2025-06-19

## 2025-06-19 NOTE — DISCHARGE INSTRUCTIONS
The Doctor (Radiologist) who performed your procedure was:     Place an ice pack over the biopsy site on top of your bra or on top of the ACE wrap (never apply ice directly over skin) for 10-15 minutes of every hour until bedtime for your comfort and to decrease bleeding.  Keep your sports bra or the ACE wrap (stereotactic and MRI biopsy) in place for 24 hours after your biopsy. This compression decreases bleeding and breast movement for your comfort. Wear a supportive bra for the next couple of days for comfort (sports bra for sleep).   Continue to wear, preferably, a sports bra or good supportive bra for 1 week and take off only to shower.  No baths or showers during the first 24 hours after biopsy. After this time you may take a shower. It's okay if the strips get wet but do not soak them. NO saunas, hot tubs or swimming until steri-strips fall off (approx. 5 days). This prevents infection and allows time for them to completely close and heal.  DO NOT remove the steri-strips. They will fall off in 5 days. If any type of irritation (redness, itching or blisters) develops in the area around the steri-strips, remove them gently. If the steri-strips do not fall off after 5 days, gently remove them. Keep the area clean and dry.  It is normal to have mild discomfort and bruising at the biopsy site.  You may take Tylenol as needed for discomfort, as long as you have no allergies to Tylenol. Do not take aspirin, motrin, ibuprofen or any medication containing NSAID (non-steroidal anti-inflammatory drug) product for 48 hours.  DO NOT participate in strenuous activity (aerobics, heavy lifting, housework, gardening, etc.) 48 hours after your biopsy to prevent bleeding.  You will receive results in 2-3 business days.  If you are having an MRI breast biopsy or an Ultrasound guided breast biopsy, you will be billed for the biopsy and unilateral mammogram separately.  If you have any questions about the procedure or your  results, please contact the Breast Care Coordinator Nurse at [phone].  Notify your ordering physician or primary physician for increased bleeding, pain or fever over 100. Or contact a Radiology Nurse at [phone] between 8am-4pm (after 4pm, your call will be directed to the [place] [facility]).

## 2025-06-19 NOTE — TELEPHONE ENCOUNTER
This nurse introduced self and role of breast coordinator.  Discussed recommended breast biopsy with patient. Pt was recommended by Dr. Howard  to have a left  breast  x 3 ultrasound guided biopsy. Case was reviewed  in tumor board pt needs to return for  3 site us Bx. Orders were placed. Pt was provided Monday 6/23/25 [facility] at 630 am.   Pt history discussed as below:  Pt history of biopsy:  yes newly dx breast ca left    yes ovarian maternal  Cousin dx 48   Pt history of breast cancer:no  Hx BCP use:  in her 20's                  HRT use:  no ivf no       Recommendations : . A focal asymmetry in the left breast upper outer quadrant at posterior depth and an asymmetry in the left lower breast at posterior depth seen on mL view are now deemed suspicious.  These lesions may correlate with sonographic mass at 3 o'clock 10 cm   from the nipple and/or possible sonographic masses at 3 o'clock 12 cm from the nipple and 4 o'clock 12 cm from the nipple.  If breast conserving therapy is desired, stereotactic biopsy of any non correlating lesions should be performed after   ultrasound-guided biopsy.        Dictated by (CST): Fuentes Howard MD on 6/19/2025 at 6:53 AM       Finalized by (CST): Fuentes Howard MD on 6/19/2025 at 7:02 AM         Recommedations :                      see [facility] for dictated radiology report   Reviewed pertinent patient history, family history of cancer, and patient medications.     -All herbal supplements, Vitamin E, Fish Oil    -All NSAIDs (Ibuprofen, Motrin, Advil, Aleve, or other antiinflammatory medication)  and Aspirin  81mg currently being taken    not  recommended or prescribed by  your physician  should be held for 5  days prior to biopsy.  Denies usage   -Aspirin 81 mg being taken related to a cardiac condition  or prescribed by your  physician should be held at the  direction of your physician.  Informed patient to call ordering physician for guidelines denies usage    -Blood  thinners/antiplatelet medications (Coumadin, Plavix ect) should be held at the  direction of your physician.  Informed patient to call ordering physician for guidelines denies usage   Reviewed US guided biopsy procedure, as below.  You will be lying on your back, potentially slightly toward one side, for this procedure.  The US technician will use the ultrasound machine to locate the area in question that was seen on your previous breast imaging.  The Radiologist will then inject a local numbing medication into the area. This may burn and sting for several seconds .  Then use a needle to collect cells or tissue from the site.  A marker, or clip, will then be placed in the biopsied area.  This marker is placed so this biopsy site is able to be accurately located upon future breast imaging.  After the clip is placed, steri strips will be applied to  the biopsy site and should be kept on for 5 days.  Additional mammography films will then be taken to assure correct placement of the placed marker.    Educated the patient they will be awake during this procedure and are able to drive themselves home if they wish.  Educated patient that they should eat breakfast and park in green lot and check in with [facility] .  Educated patient that some soreness  may occur after biopsy.  Discussed use of a supportive bra and ice packs after procedure, to decrease soreness.  Tylenol only for discomfort unless they have an allergy to tylenol .  Discussed with patient no swimming, bathing,  hot tubs or submerging underwater  for 5 days post procedure until the incision is closed and healed.   Educated patient on lifting restrictions - nothing heavier than a gallon of milk for 24-48 hours after the procedure.      Discussed with patient that some soreness and bruising is normal after biopsy but that prolonged or increased pain and bruising should be reported to the ordering physician.   Educated patient that they should bring a  sports bra or form fitting bra on day of procedure. Educated patient that this helps with comfort after the biopsy and decrease swelling.  Reviewed results process with patient and shared that pathology results will be available within 2-3 business days of their biopsy.  Discussed results will be communicated by their ordering physician unless otherwise indicated.  Educated patient that once we receive an order from her physician  our radiology secretaries would be calling   to schedule the biopsy.

## 2025-06-23 ENCOUNTER — HOSPITAL ENCOUNTER (OUTPATIENT)
Dept: MAMMOGRAPHY | Facility: HOSPITAL | Age: 65
Discharge: HOME | End: 2025-06-23
Attending: SURGERY
Payer: MEDICARE

## 2025-06-23 ENCOUNTER — HOSPITAL ENCOUNTER (OUTPATIENT)
Dept: ULTRASOUND IMAGING | Facility: HOSPITAL | Age: 65
Discharge: HOME | End: 2025-06-23
Attending: SURGERY
Payer: MEDICARE

## 2025-06-23 DIAGNOSIS — C50.919 PRIMARY SOLID PAPILLARY CARCINOMA OF BREAST WITH INVASION (HCC): ICD-10-CM

## 2025-06-23 PROCEDURE — 19084 BX BREAST ADD LESION US IMAG: CPT | Performed by: SURGERY

## 2025-06-23 PROCEDURE — 88305 TISSUE EXAM BY PATHOLOGIST: CPT | Performed by: SURGERY

## 2025-06-23 PROCEDURE — 88360 TUMOR IMMUNOHISTOCHEM/MANUAL: CPT | Performed by: SURGERY

## 2025-06-23 PROCEDURE — 77065 DX MAMMO INCL CAD UNI: CPT | Performed by: SURGERY

## 2025-06-23 PROCEDURE — 19083 BX BREAST 1ST LESION US IMAG: CPT | Performed by: SURGERY

## 2025-06-23 PROCEDURE — 88341 IMHCHEM/IMCYTCHM EA ADD ANTB: CPT | Performed by: SURGERY

## 2025-06-23 PROCEDURE — 88342 IMHCHEM/IMCYTCHM 1ST ANTB: CPT | Performed by: SURGERY

## 2025-06-25 ENCOUNTER — TELEPHONE (OUTPATIENT)
Age: 65
End: 2025-06-25

## 2025-06-25 DIAGNOSIS — C50.919 MALIGNANT NEOPLASM OF FEMALE BREAST, UNSPECIFIED ESTROGEN RECEPTOR STATUS, UNSPECIFIED LATERALITY, UNSPECIFIED SITE OF BREAST (HCC): Primary | ICD-10-CM

## 2025-06-25 NOTE — TELEPHONE ENCOUNTER
NN phoned patient to discuss plan of care regarding recent biopsy results at the request of Dr. Rosas. NN explained that due to recent findings, Dr. Rosas is recommending patient proceed with a mastectomy. Patient tearful. NN provided support. NN offered to schedule patient with plastic surgery to discuss reconstruction. Patient also interested in meeting with Dr. Rosas to discuss this new plan. NN offered  patient appointment with Dr. Rosas on Monday at 8:45. Patient agreeable. Patient appreciative of the call and encouraged to reach out with questions or concerns.

## 2025-06-27 ENCOUNTER — TELEPHONE (OUTPATIENT)
Age: 65
End: 2025-06-27

## 2025-06-27 ENCOUNTER — TELEPHONE (OUTPATIENT)
Dept: SURGERY | Facility: CLINIC | Age: 65
End: 2025-06-27

## 2025-06-27 DIAGNOSIS — C50.919 PRIMARY SOLID PAPILLARY CARCINOMA OF BREAST WITH INVASION (HCC): Primary | ICD-10-CM

## 2025-06-27 NOTE — TELEPHONE ENCOUNTER
Spoke to Lynnette John, radiology regarding patient's upcoming apt on Monday 6/30/25 to discuss surgical plan. Surgery is currently scheduled for 7/1/25 with Dr. Rosas.

## 2025-06-30 ENCOUNTER — OFFICE VISIT (OUTPATIENT)
Dept: SURGERY | Facility: CLINIC | Age: 65
End: 2025-06-30

## 2025-06-30 ENCOUNTER — TELEPHONE (OUTPATIENT)
Age: 65
End: 2025-06-30

## 2025-06-30 ENCOUNTER — TELEPHONE (OUTPATIENT)
Dept: SURGERY | Facility: CLINIC | Age: 65
End: 2025-06-30

## 2025-06-30 ENCOUNTER — LAB ENCOUNTER (OUTPATIENT)
Dept: LAB | Facility: HOSPITAL | Age: 65
End: 2025-06-30
Attending: SURGERY
Payer: MEDICARE

## 2025-06-30 VITALS — SYSTOLIC BLOOD PRESSURE: 147 MMHG | DIASTOLIC BLOOD PRESSURE: 73 MMHG | HEART RATE: 67 BPM

## 2025-06-30 DIAGNOSIS — C50.412 MALIGNANT NEOPLASM OF UPPER-OUTER QUADRANT OF LEFT BREAST IN FEMALE, ESTROGEN RECEPTOR NEGATIVE (HCC): Primary | ICD-10-CM

## 2025-06-30 DIAGNOSIS — C50.412 MALIGNANT NEOPLASM OF UPPER-OUTER QUADRANT OF LEFT BREAST IN FEMALE, ESTROGEN RECEPTOR NEGATIVE (HCC): ICD-10-CM

## 2025-06-30 DIAGNOSIS — Z17.1 MALIGNANT NEOPLASM OF UPPER-OUTER QUADRANT OF LEFT BREAST IN FEMALE, ESTROGEN RECEPTOR NEGATIVE (HCC): Primary | ICD-10-CM

## 2025-06-30 DIAGNOSIS — Z17.1 MALIGNANT NEOPLASM OF UPPER-OUTER QUADRANT OF LEFT BREAST IN FEMALE, ESTROGEN RECEPTOR NEGATIVE (HCC): ICD-10-CM

## 2025-06-30 DIAGNOSIS — C50.412 MALIGNANT NEOPLASM OF UPPER-OUTER QUADRANT OF LEFT FEMALE BREAST, UNSPECIFIED ESTROGEN RECEPTOR STATUS (HCC): Primary | ICD-10-CM

## 2025-06-30 LAB
ALBUMIN SERPL-MCNC: 4.7 G/DL (ref 3.2–4.8)
ALBUMIN/GLOB SERPL: 1.9 {RATIO} (ref 1–2)
ALP LIVER SERPL-CCNC: 103 U/L (ref 55–142)
ALT SERPL-CCNC: 28 U/L (ref 10–49)
ANION GAP SERPL CALC-SCNC: 10 MMOL/L (ref 0–18)
AST SERPL-CCNC: 25 U/L (ref ?–34)
ATRIAL RATE: 64 BPM
BASOPHILS # BLD AUTO: 0.07 X10(3) UL (ref 0–0.2)
BASOPHILS NFR BLD AUTO: 1 %
BILIRUB SERPL-MCNC: 0.6 MG/DL (ref 0.2–1.1)
BUN BLD-MCNC: 9 MG/DL (ref 9–23)
BUN/CREAT SERPL: 9.3 (ref 10–20)
CALCIUM BLD-MCNC: 9.8 MG/DL (ref 8.7–10.4)
CHLORIDE SERPL-SCNC: 102 MMOL/L (ref 98–112)
CO2 SERPL-SCNC: 26 MMOL/L (ref 21–32)
CREAT BLD-MCNC: 0.97 MG/DL (ref 0.55–1.02)
DEPRECATED RDW RBC AUTO: 42 FL (ref 35.1–46.3)
EGFRCR SERPLBLD CKD-EPI 2021: 64 ML/MIN/1.73M2 (ref 60–?)
EOSINOPHIL # BLD AUTO: 0.11 X10(3) UL (ref 0–0.7)
EOSINOPHIL NFR BLD AUTO: 1.6 %
ERYTHROCYTE [DISTWIDTH] IN BLOOD BY AUTOMATED COUNT: 13.2 % (ref 11–15)
FASTING STATUS PATIENT QL REPORTED: NO
GLOBULIN PLAS-MCNC: 2.5 G/DL (ref 2–3.5)
GLUCOSE BLD-MCNC: 162 MG/DL (ref 70–99)
HCT VFR BLD AUTO: 41.6 % (ref 35–48)
HGB BLD-MCNC: 14 G/DL (ref 12–16)
IMM GRANULOCYTES # BLD AUTO: 0.03 X10(3) UL (ref 0–1)
IMM GRANULOCYTES NFR BLD: 0.4 %
LYMPHOCYTES # BLD AUTO: 2.02 X10(3) UL (ref 1–4)
LYMPHOCYTES NFR BLD AUTO: 28.7 %
MCH RBC QN AUTO: 29.4 PG (ref 26–34)
MCHC RBC AUTO-ENTMCNC: 33.7 G/DL (ref 31–37)
MCV RBC AUTO: 87.2 FL (ref 80–100)
MONOCYTES # BLD AUTO: 0.85 X10(3) UL (ref 0.1–1)
MONOCYTES NFR BLD AUTO: 12.1 %
NEUTROPHILS # BLD AUTO: 3.95 X10 (3) UL (ref 1.5–7.7)
NEUTROPHILS # BLD AUTO: 3.95 X10(3) UL (ref 1.5–7.7)
NEUTROPHILS NFR BLD AUTO: 56.2 %
OSMOLALITY SERPL CALC.SUM OF ELEC: 288 MOSM/KG (ref 275–295)
P AXIS: 51 DEGREES
P-R INTERVAL: 182 MS
PLATELET # BLD AUTO: 267 10(3)UL (ref 150–450)
POTASSIUM SERPL-SCNC: 4.1 MMOL/L (ref 3.5–5.1)
PROT SERPL-MCNC: 7.2 G/DL (ref 5.7–8.2)
Q-T INTERVAL: 394 MS
QRS DURATION: 80 MS
QTC CALCULATION (BEZET): 406 MS
R AXIS: 30 DEGREES
RBC # BLD AUTO: 4.77 X10(6)UL (ref 3.8–5.3)
SODIUM SERPL-SCNC: 138 MMOL/L (ref 136–145)
T AXIS: 49 DEGREES
VENTRICULAR RATE: 64 BPM
WBC # BLD AUTO: 7 X10(3) UL (ref 4–11)

## 2025-06-30 PROCEDURE — 80053 COMPREHEN METABOLIC PANEL: CPT | Performed by: SURGERY

## 2025-06-30 PROCEDURE — 3077F SYST BP >= 140 MM HG: CPT | Performed by: SURGERY

## 2025-06-30 PROCEDURE — 93005 ELECTROCARDIOGRAM TRACING: CPT

## 2025-06-30 PROCEDURE — 1159F MED LIST DOCD IN RCRD: CPT | Performed by: SURGERY

## 2025-06-30 PROCEDURE — 99213 OFFICE O/P EST LOW 20 MIN: CPT | Performed by: SURGERY

## 2025-06-30 PROCEDURE — 85025 COMPLETE CBC W/AUTO DIFF WBC: CPT | Performed by: SURGERY

## 2025-06-30 PROCEDURE — 1160F RVW MEDS BY RX/DR IN RCRD: CPT | Performed by: SURGERY

## 2025-06-30 PROCEDURE — 36415 COLL VENOUS BLD VENIPUNCTURE: CPT | Performed by: SURGERY

## 2025-06-30 PROCEDURE — 1170F FXNL STATUS ASSESSED: CPT | Performed by: SURGERY

## 2025-06-30 PROCEDURE — 3078F DIAST BP <80 MM HG: CPT | Performed by: SURGERY

## 2025-06-30 PROCEDURE — 93010 ELECTROCARDIOGRAM REPORT: CPT | Performed by: INTERNAL MEDICINE

## 2025-06-30 NOTE — PROGRESS NOTES
Established Patient Follow-up      6/30/2025    Hermila Solorio 65 year old      HPI  Chief Complaint   Patient presents with    Lump     Per pt, she was supposed to have surgery but there was a change in Diagnose and is here to talk about it.     Patient here to discuss management of her left breast cancer after further biopsy shows 2 other areas of invasive papillary cancer.  I recommended mastectomy with reconstruction.  She will meet with Dr. Zhao from plastics and will plan accordingly.      Exam  Wound site hematoma resolving      Assessment/Plan  Assessment   1. Malignant neoplasm of upper-outer quadrant of left breast in female, estrogen receptor negative (HCC)        Invasive papillary cancer.  Plan left mastectomy, sentinel lymph node biopsy with reconstruction.  She will discuss with plastic surgery and decide whether she wants a prophylactic mastectomy on the other side.  Labs, EKG ordered       Venkatesh Rosas MD

## 2025-06-30 NOTE — PATIENT INSTRUCTIONS
Make an appointment with Dr. Zhao from plastic surgery to discuss breast reconstruction.  [phone]    Obtain your EKG, labs    Once you have met with Dr. Zhao we will schedule your surgery.

## 2025-06-30 NOTE — TELEPHONE ENCOUNTER
NN phoned patient to follow up on today's visit with Dr. Rosas. Patient feeling better about mastectomy after meeting with the surgeon. She is scheduled to see the plastic surgeon on 7/8. NN confirmed that patient is scheduled for Genetics 7/3 and explained purpose of testing.     NN will touch base with patient after 7/8 appointment. Patient appreciative of the call and encouraged to reach out with questions or concerns.

## 2025-07-01 ENCOUNTER — TELEPHONE (OUTPATIENT)
Age: 65
End: 2025-07-01

## 2025-07-01 NOTE — TELEPHONE ENCOUNTER
NN phoned [facility] to see if patient's insurance is accepted with their PCPs. Patient's insurance is accepted by [facility]. NN requested to help get patient scheduled with first available PCP. [facility] offered appointment with Dr. Miles Abreu on 7/9 at 11:00 in the [place] Location.     This information was shared with the patient. Patient appreciative of the help and assistance. NN provided number to scheduling for [facility] should the patient need to adjust or change appointment. NN also informed patient that she needs to call [facility] and update PCP.

## 2025-07-01 NOTE — TELEPHONE ENCOUNTER
Patient phoned NN after being told her insurance is no longer accepted by PCP. Patient very tearful. NN provided emotional support. NN reassured patient that this writer will look into insurance situation and help get patient set up with new PCP if needed. Patient very appreciative of the help.

## 2025-07-03 ENCOUNTER — OFFICE VISIT (OUTPATIENT)
Age: 65
End: 2025-07-03
Attending: FAMILY MEDICINE
Payer: MEDICARE

## 2025-07-03 ENCOUNTER — NURSE ONLY (OUTPATIENT)
Age: 65
End: 2025-07-03
Attending: FAMILY MEDICINE
Payer: MEDICARE

## 2025-07-03 DIAGNOSIS — Z80.41 FAMILY HISTORY OF OVARIAN CANCER: ICD-10-CM

## 2025-07-03 DIAGNOSIS — C50.919 PRIMARY SOLID PAPILLARY CARCINOMA OF BREAST WITH INVASION (HCC): Primary | ICD-10-CM

## 2025-07-03 DIAGNOSIS — Z80.3 FAMILY HISTORY OF BREAST CANCER: ICD-10-CM

## 2025-07-03 NOTE — PROGRESS NOTES
Patient Name: Hermila Solorio  YOB: 1960  Date of Visit: 7/3/2025    Reason for visit: Ms. Solorio was seen for the purposes of genetic counseling due to her recent diagnosis of breast cancer at age 65y and a family history of breast and ovarian cancer. The purpose of this visit was to review information regarding genetic testing options for mutations in high penetrance cancer susceptibility genes.    Referring Provider: Venkatesh Rosas MD    Medical History: Ms. 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 She has met with Dr. Rosas with surgery. She understands how genetic testing results may impact surgical decision making.    She denies any other cancer hx. She retains her uterus and ovaries. She has had a colonoscopy last in 10/2023 which removed #3 adenomatous polyps, recall in 3 years was advised.     Ms. Solorio achieved menarche at approximately 12 years of age, is post-menopausal (mid-40s), and has been pregnant 2 times (), delivering her son (now ) at 18y. Ms. Solorio has a <5-year history of oral contraceptive use and denies any fertility or hormone replacement use.      Relevant Family History: Ms. Solorio had 1 son who was born with Down syndrome and  at 10y dt heart issues.     She has 1 sister and 5 brothers.     Her mother  at 88y. She has 1 maternal aunt and 4 maternal uncles. The maternal aunt has 4 daughters and 2 sons, 2 of the daughters were diagnosed with ovarian cancer, 1 was dx at 50y (l. 54y) and the other was dx at 60y and  at 62y. The maternal grandmother  ~84y. No info is known about the maternal grandfather.     Ms. Solorio's father  in his 60s dt diabetes complications. There were 4 paternal aunts. One paternal aunt has 5 children, 1 daughter was dx with breast cancer in her 40s. The paternal grandmother  in her 90s. No info is known about the paternal grandfather.      The rest of the family history is negative for other significant genetic conditions, cancers, or birth defects of any kind. See scanned pedigree for full family history reported during the session.    Ms. Solorio's maternal and paternal ethnicity is [place]. She is not of any Ashkenazi Mandaen heritage.     Summary: The majority of cancers are sporadic whereas approximately 10-30% of cases of cancer cases are attributed to familial factors such as unidentified low penetrance genes in the family or shared environmental factors.  Approximately 5-10% of cancers are related to a hereditary cancer syndrome.  Signs of a hereditary cancer syndrome include some rare cancers, common cancers occurring at unusually young ages, multiple primary cancers in the same individual, multiple colorectal polyps, or the same type of cancer or related cancers (e.g., breast and ovarian, colorectal and endometrial) in three or more individuals in the same lineage.     Cancer is usually caused by gene mutations that occur randomly in one or a few cells of the body. Such gene changes, called somatic mutations, may arise as a natural consequence of aging or when a cell’s DNA has been damaged. Acquired mutations are only present in some of the body’s cells, and they are not passed on from parents to their children.    However, in the small percentage of people with a hereditary cancer syndrome, the disease is due to a different type of mutation called a hereditary mutation, or germline mutation. These mutations are usually inherited from one or both of the person’s parents, and are present in nearly every cell of the body. Because hereditary mutations are present in the DNA of sperm and egg cells, they can be passed down in families. People who carry such hereditary mutations do not necessarily get cancer, but their risk of developing the disease at some point during their lifetime is higher than average. When a personal and/or family history  doesn't clearly fit a single hereditary cancer syndrome, panel genetic testing examining multiple genes in which pathogenic mutations cause hereditary cancer syndromes is appropriate.     If testing is performed, three results are possible: positive, negative, and variant of uncertain significance.  A positive result indicates a pathogenic variant (harmful gene mutation) has been identified, and there is an increased risk for the cancers associated with the specific gene.  Since pathogenic variants in most cancer susceptibility genes are inherited in an autosomal dominant fashion, siblings and children of individuals with a pathogenic variant have a 50% risk of carrying the same familial pathogenic variant as well.      A negative test result would indicate that no pathogenic variant was identified in a cancer susceptibility gene.  While testing detects gene mutations, it is possible for a genetic variant to be present and go undetected.  It is also possible for a genetic variant to be located in a gene other than those being tested.     A variant of uncertain significance means that a change has been identified a cancer susceptibility gene; however, it is uncertain if the variant is pathogenic or a non-deleterious (benign) change.  With time, the variant may be reclassified as either pathogenic or benign.    Since pathogenic variant identification is necessary, an affected family member is preferred in order to confirm that a pathogenic variant is indeed present in a particular family.  If the pathogenic variant can be identified in an affected individual, this information can be used to screen other at-risk individuals in the family.  Individuals who are positive for the same pathogenic variant would be expected to have a much greater risk for developing hereditary cancer during their lifetime than the general population and surveillance and management would be rigorous.  For those individuals who are found to not  carry the pathogenic variant, risks for developing cancer during their lifetime would return to those expected for individuals in the general population.  It should be emphasized that absence of a pathogenic variant in an at-risk individual would not eliminate their risk for cancer, but simply return them to the risk expected for the general population. If no affected individual is available for testing, a negative result, while reassuring, cannot be completely informative of the familial cancer risk as it is unknown whether no pathogenic variant was found because the individual tested is truly negative for the familial pathogenic variant or whether the familial pathogenic variant was unable to be detected by the genetic testing method used. In such cases, screening and follow-up should be guided by personal and family history.     Because Ms. Solorio was diagnosed with breast cancer at 65y with a family history of breast cancer in a paternal female 1st cousin dx in her 40s and ovarian cancer in two maternal female 1st cousins (dx 50y and 60y), genetic testing for pathogenic variants in high-penetrance cancer susceptibility genes is indicated based on the NCCN guidelines (BOP V.3.2025).      Ms. Solorio appeared to understand the information presented. On the day of the visit Ms. Solorio elected to proceed with genetic testing for hereditary cancer syndromes. My office will call Ms. Solorio as soon as results are received; post-test counseling can be scheduled at that time. Thank you for allowing me to participate in the care of your patient; please do not hesitate to contact my office if you have any questions or concerns, [phone].    Plan:   Blood was drawn and sent out for [facility] hereditary breast cancer STAT panel (9 high-risk breast cancer genes; TAT: 5-12 days, average of 7 days) with reflex to the hereditary breast and gyn cancers guidelines-based panel (19 genes; TAT: ~<1-2 weeks after STAT).   The Genetics office  will call Ms. Solorio when results are available.  Recommendations for Ms. Solorio and family members will depend the above genetic testing results.      Send to: Alison/Saúl  Time spent managing patient care: 40 minutes      Phuong Hernandez MS, [facility]

## 2025-07-08 ENCOUNTER — OFFICE VISIT (OUTPATIENT)
Facility: CLINIC | Age: 65
End: 2025-07-08
Payer: MEDICARE

## 2025-07-08 VITALS
TEMPERATURE: 98 F | WEIGHT: 167.38 LBS | OXYGEN SATURATION: 97 % | RESPIRATION RATE: 16 BRPM | HEIGHT: 63.19 IN | HEART RATE: 75 BPM | DIASTOLIC BLOOD PRESSURE: 83 MMHG | BODY MASS INDEX: 29.29 KG/M2 | SYSTOLIC BLOOD PRESSURE: 135 MMHG

## 2025-07-08 DIAGNOSIS — C50.919 PRIMARY INTRACYSTIC PAPILLARY CARCINOMA OF BREAST (HCC): Primary | ICD-10-CM

## 2025-07-08 PROCEDURE — 3079F DIAST BP 80-89 MM HG: CPT | Performed by: SURGERY

## 2025-07-08 PROCEDURE — 3075F SYST BP GE 130 - 139MM HG: CPT | Performed by: SURGERY

## 2025-07-08 PROCEDURE — 1159F MED LIST DOCD IN RCRD: CPT | Performed by: SURGERY

## 2025-07-08 PROCEDURE — 99203 OFFICE O/P NEW LOW 30 MIN: CPT | Performed by: SURGERY

## 2025-07-08 PROCEDURE — 3008F BODY MASS INDEX DOCD: CPT | Performed by: SURGERY

## 2025-07-08 NOTE — CONSULTS
New Patient Consultation    This is the first visit for this 65 year old female who presents to discuss reconstructive options following surgery for breast cancer.    History of Present Illness:   The patient is a 65 year old female who presents with a left breast cancer found by radiographic abnormality.  The patient ultimately underwent biopsy which revealed solid papillary carcinoma.  She does not have breast pain. She does have family history of breast cancer in her paternal cousin. She does not have significant past history for breast diseases or breast surgery.  She wears a 40D bra.  The patient was seen by Dr. Rosas and has elected to undergo a left mastectomy.  She is here today to discuss post-mastectomy reconstructive options.  She is unsure as to whether she wishes to proceed with immediate reconstruction.    Past Medical History:      Past Medical History[1]      Past Surgical History:  Past Surgical History[2]      Medications:    Current Medications[3]      Allergies:    Allergies[4]      Family History:   Family History[5]      Social History:  History   Alcohol Use No       History   Smoking Status    Never   Smokeless Tobacco    Never       History   Drug Use No           Review of Systems:    General:   The patient denies, fever, chills, night sweats, fatigue, generalized weakness, change in appetite, weight loss, or weight gain.    Endocrine:  There is no history of poor/slow wound healing, weight loss/gain, fertility or hormone problems, cold intolerance, heat intolerance, excessive thirst, or thyroid disease.    Allergic/Immunologic:  There is no history of hives, hay fever, angioedema or anaphylaxis.    HEENT:  The patient denies ear pain, ear drainage, hearing loss, change in vision, double vision, cataracts, glaucoma, nasal congestion, nosebleed, hoarseness, sore throat, or swollen glands.    Respiratory:  The patient denies shortness of breath, cough, bloody cough, phlegm, asthma, or  wheezing.    Cardiovascular:   The patient denies chest pain/pressure, palpitations, irregular heartbeat, high blood pressure, stroke, or leg swelling.    Breasts:  The patient denies breast masses, pain, change in the breast skin, skin dimpling, nipple discharge, or rash.    Gastrointestinal:  There is no history of difficulty or pain with swallowing, reflux symptoms, nausea, vomiting, dark/bloody stools, diarrhea, constipation, change in bowel habits, or abdominal pain.    Genitourinary:  The patient denies frequent urination, needing to get up at night to urinate, urinary hesitancy or retaining urine, painful urination, urinary incontinence, decreased urine stream, blood in urine, or vaginal/penile discharge.    Skin:  Then patient denies rash, itching, skin lesions, dry skin, change in skin color or change in moles, sunburn with blistering.    Hematologic/Lymphatic:  The patient denies easily bruising or bleeding, persistent swollen glands or lymph nodes, bleeding disorders, blood clots, or pulmonary embolism.    Gynecologic:  The patient denies irregular menses, pelvic pain, pain with intercourse, painful menses, or pregnancy.    Musculoskeletal:  The patient denies muscle aches/pain, joint pain, stiff joints, neck pain, back pain or bone pain.    Neurologic:  There is no history of migraines or severe headaches, seizure/epilepsy, speech problems, coordination problems, trembling/tremors, fainting/black outs, dizziness, memory problems, loss of sensation/numbness, problems walking, weakness, tingling or burning in hands/feet.     Psychiatric:  There is no history of abusive relationship, bipolar disorder, sleep disturbance, anxiety, depression or feeling of despair.     Physical Exam:  Vitals:    07/08/25 0848   BP: 135/83   Pulse: 75   Resp: 16   Temp: 98 °F (36.7 °C)     Body mass index is 29.48 kg/m².      The patient is awake, alert, and oriented.  She is well-nourished, well-developed woman who appears her  stated age. Her speech patterns and movements are normal. Her affect is appropriate.    HEENT: The head is normocephalic. The neck is supple. The thyroid is not enlarged and is without palpable masses.  The trachea is in the midline. Conjunctiva are clear, non-icteric.    Right breast: Grade 2 ptosis is noted.  Striae are noted.  Measurements: sternal notch to nipple 30cm, nipple to inframammary fold 16cm, base diameter 16cm.  The skin, nipple, and areola appear normal.  There is no nipple retraction or discharge.  There are no dominant masses in the breast.     Left breast:  Grade 2 ptosis is noted.  Striae are noted.  Measurements:  sternal notch to nipple 29cm, nipple to inframammary fold 13cm, base diameter 16cm.  The skin, nipple, and areola appear normal.  There is no nipple retraction or discharge.  There are no dominant masses in the breast.      Abdomen:  A moderate abdominal pannus with striae is noted.  Well-healed laparoscopic port sites are noted.  No palpable hernia is detected.     Lymph Nodes:  There is no cervical, supraclavicular, or axillary lymphadenopathy appreciated.    Back: There is no vertebral column tenderness.    Skin: The skin appears normal. There are no suspicious appearing rashes or lesions.    Extremities: The extremities are without deformity, cyanosis or edema.    Impression:   The patient is a 65 year old female who presents with left breast cancer awaiting left skin-sparing mastectomy.     Discussion and Plan:  We reviewed the options for post-mastectomy reconstruction and discussed the risks/benefits of timing (immediate versus delayed) and technique (implant-based versus autologous).  Given the patient's desires, the majority of discussion was focused on implant-based reconstruction.    Specifically, the nature and technique of tissue expander reconstruction was reviewed with the patient. We discussed the marginal submuscular placement of the tissue expander, use of acellular  matrix, and placement of drains.  We discussed that use of acellular dermal matrix in  breast reconstruction is considered an \"off label\" use.  The expansion process, as well as the need for a secondary procedure for placement of a permanent implant, were reviewed.  Representative illustrations and photographs were demonstrated to the patient.  We reviewed the elevated risk of bleeding difficulties given the patient's age and diabetes.  The risks of surgery including but not limited to bleeding, infection, scarring, delayed wound healing, seroma, asymmetry, implant infection/extrusion requiring removal, injury to adjacent structures, capsular contracture, ALCL, breast implant associated illness, need for implant exchange, and need for further surgery were reviewed. The expected post-operative course was discussed including the need for activity limitation, drains, and suture removal.  The recommended FDA surveillance protocol for silicone implants was reviewed.  Finally, we reviewed the impact of post-mastectomy radiation therapy on implant-based reconstruction (should it be deemed necessary based on the final pathology results), including increased risk of reconstructive loss and capsular contracture.     Finally, we discussed the possible need for revisions, the process of nipple areolar reconstruction, and a contralateral balancing procedure (augmentation, mastopexy, reduction).     Multiple questions were answered to the patient's satisfaction. No guarantees as to outcome were offered. The patient is considering her options and return for preoperative visit if she wishes to proceed with mastectomy and reconstruction. A total of 40 minutes was spent reviewing the patient's history and diagnostic testing, examining the patient, reviewing reconstructive options, and coordinating the patient's care.          [1]   Past Medical History:   Biliary colic    acute    Cholelithiasis    Diabetes (HCC)    not taking  medicines    Ectopic pregnancy (HCC)    Migraines   [2]   Past Surgical History:  Procedure Laterality Date    Breast biopsy Left 2021    Left Breast Microductectomy    Cholecystectomy      Colonoscopy      Colonoscopy N/A 2020    Procedure: COLONOSCOPY;  Surgeon: Desmond Valdez MD;  Location: [facility] ENDOSCOPY    Colonoscopy N/A 10/31/2023    Procedure: COLONOSCOPY;  Surgeon: Desmond Valdez MD;  Location: [facility] ENDOSCOPY    Electrocardiogram, complete  2014    scanned to media tab    Laparoscopy w cholangiogram      Laparoscopic cholecystectomy w/ intraoperative cholangiogram    Marcia localization wire 1 site left (cpt=[id number])  2021    DR WAGNER    Needle biopsy left      Needle biopsy left  2025    Upper gi endoscopy,biopsy     [3]   No outpatient medications have been marked as taking for the 25 encounter (Appointment) with Saran Zhao MD.   [4] No Known Allergies  [5]   Family History  Problem Relation Age of Onset    Hypertension Mother     Arthritis Mother         rheumatoid    Psoriasis Mother     Diabetes Father     Diabetes Brother         per NG: \"four brothers have diabetes\"    Ovarian Cancer Maternal Cousin Female 50    Ovarian Cancer Maternal Cousin Female 60        d. 62y    Breast Cancer Paternal Cousin Female         40s    Genetic Disease Son         dx Down syndrome,  at 10y dt heart problem    Colon Cancer Neg     Prostate Cancer Neg     Pancreatic Cancer Neg

## 2025-07-14 ENCOUNTER — TELEPHONE (OUTPATIENT)
Age: 65
End: 2025-07-14

## 2025-07-14 ENCOUNTER — GENETICS ENCOUNTER (OUTPATIENT)
Age: 65
End: 2025-07-14

## 2025-07-14 DIAGNOSIS — Z13.71 BRCA GENE MUTATION NEGATIVE IN FEMALE: Primary | ICD-10-CM

## 2025-07-14 NOTE — PROGRESS NOTES
Patient Name: Hermila Solorio  YOB: 1960    Referring Provider:  Venkatesh Rosas MD      Reason for Referral:  Ms. Solorio had genetic testing performed on 7/3/2025 because of her recent diagnosis of breast cancer at age 65y and a family history of breast and ovarian cancer.      Genetic Testing Result:  Negative. No pathogenic variant was found in the following 19 genes on the [facility] hereditary breast cancer STAT panel and hereditary breast and gyn cancers guidelines-based panel: ALEIDA*, BARD1, BRCA1, BRCA2, BRIP1, CDH1, CHEK2, EPCAM*, MLH1*, MSH2*, MSH6*, NF1*, PALB2, PMS2*, PTEN*, RAD51C, RAD51D, STK11, TP53. Please refer to the report from [facility]\""e for additional testing information. These results were discussed with Ms. Solorio via telephone on 7/14/2025.    Summary and Plan:   These results indicate that Ms. Solorio was not found to have a pathogenic variant (harmful genetic mutation) in any of the genes listed above. No pathogenic variants associated with hereditary breast and/or gynecological cancer syndromes were identified. The limitations of the testing were discussed with Ms. Solorio including the chance that a pathogenic variant in a gene other than those included in this analysis might be the cause of cancer in Ms. Solorio or in relatives.     Medical management and surveillance for Ms. Solorio and other family members should be based on their personal and family history. All medical management decisions should be made with a physician.     I encouraged Ms. Solorio to share the genetic test results with her relatives so that they may discuss the implications of this information with their health providers. Ms. Solorio is also encouraged to contact me on an annual basis to learn if there have been any updates in genetic information that would apply or if there are changes in the personal and/or family history. Please do not hesitate to contact my office if you have any questions or concerns, [phone].     Phuong  David, MS, CGC

## 2025-07-15 NOTE — TELEPHONE ENCOUNTER
Patient phoned NN to discuss next steps regarding breast surgery. Patient states she reached out to Dr. Rosas's office last week but has not heard back. NN inquired about unilateral vs bilateral mastectomy. Patient is undecided as she doesn't quite understand risks/benefits of having prophylactic right. NN and patient briefly discussed some benefit of bilateral mastectomy, but NN encouraged patient to discuss with Dr. Rosas and his team. NN will reach out to Dr. Rosas to expedite next steps for patient.     Patient appreciative of the help and encouraged to reach out with questions or concerns.

## 2025-07-15 NOTE — TELEPHONE ENCOUNTER
Patient has been scheduled for left mastectomy on 7/29, NN phoned patient to discuss. Patient still with questions regarding unilateral vs bilateral surgery. NN offered patient consultation with Dr. Carlson on 7/16 at 1:00 to discuss risk reduction and recurrence. NN explained that this appointment may be beneficial and provide insight regarding the questions and concerns patient expressed during phone call on 7/14. Patient agreeable.     Patient appreciative of the call and encouraged to reach out with questions or concerns.

## 2025-07-16 ENCOUNTER — OFFICE VISIT (OUTPATIENT)
Age: 65
End: 2025-07-16
Attending: FAMILY MEDICINE
Payer: MEDICARE

## 2025-07-16 VITALS
WEIGHT: 167 LBS | DIASTOLIC BLOOD PRESSURE: 74 MMHG | BODY MASS INDEX: 29 KG/M2 | RESPIRATION RATE: 16 BRPM | OXYGEN SATURATION: 93 % | SYSTOLIC BLOOD PRESSURE: 148 MMHG | HEART RATE: 87 BPM

## 2025-07-16 DIAGNOSIS — C50.512 MALIGNANT NEOPLASM OF LOWER-OUTER QUADRANT OF LEFT BREAST OF FEMALE, ESTROGEN RECEPTOR POSITIVE (HCC): Primary | ICD-10-CM

## 2025-07-16 DIAGNOSIS — Z17.0 MALIGNANT NEOPLASM OF LOWER-OUTER QUADRANT OF LEFT BREAST OF FEMALE, ESTROGEN RECEPTOR POSITIVE (HCC): Primary | ICD-10-CM

## 2025-07-16 NOTE — PATIENT INSTRUCTIONS
VISIT SUMMARY:  Today, you were seen for a medical oncology evaluation following your recent diagnosis of left-sided breast cancer. We discussed the details of your diagnosis, including the results of your biopsies and imaging studies. We also reviewed your general health and any other symptoms you have been experiencing.    YOUR PLAN:  -LEFT BREAST INVASIVE SOLID PAPILLARY CARCINOMA, ER/MS POSITIVE: You have been diagnosed with invasive solid papillary carcinoma of the left breast, which is a type of breast cancer that is hormone receptor-positive. This means the cancer cells grow in response to hormones. We will perform a mastectomy with sentinel lymph node biopsy on July 29, 2025. After surgery, you will receive hormone therapy for five years to reduce the risk of the cancer coming back. We will also evaluate the need for radiation therapy based on the final pathology results. You should continue with annual mammograms for your right breast and monitor for any symptoms of recurrence.    INSTRUCTIONS:  Please follow up for your mastectomy and sentinel lymph node biopsy on July 29, 2025. Continue with annual mammograms for your right breast. Monitor for any symptoms of recurrence and report them to us immediately. Follow the dietary recommendations provided to help manage your fibroid symptoms.    Contains text generated by Suze

## 2025-07-16 NOTE — CONSULTS
Hermila Solorio is a 65 year old female here at the request of Diamond Harden DO for evaluation of Malignant neoplasm of lower-outer quadrant of left breast of female, estrogen receptor positive (HCC).     History of Present Illness    Breast mass and oncologic evaluation  - New diagnosis of left-sided breast cancer, solid papillary carcinoma, intermediate nuclear grade, confirmed by biopsy on 2025, at the three o'clock position, measuring 11 mm  - Second biopsy on 2025, confirmed similar findings at the four o'clock and three o'clock positions with ER +, OR +, and HER2 - status noted  - Initial abnormality identified as focal asymmetry in the upper outer quadrant of the left breast on screening mammogram dated 2023  - Imaging on May 9, 2023, revealed a cystic and solid mass in the left breast's subareolar region, measuring 10.8 x 10.3 x 12.9 mm with irregular borders  - Biopsy on May 15, 2023, showed no malignancy; follow-up imaging recommended  - Diagnostic mammogram and ultrasound on 2023, showed stable focal asymmetries  - History of left breast microductectomy on 2021  - Multiple imaging studies and biopsies performed since   - Genetic testing for hereditary breast cancer negative, met criteria due to 2 maternal cousins with ovarian cancer and 1 paternal cousin with breast cancer.    Gynecologic History:  - Menarche age 11  - Postmenopausal age 49  - , 19.  She had 2 ectopic pregnancies, her only live birth  at age 9 from a neurological disorder.  - No breast-feeding    Constitutional and systemic symptoms  - Generally well  - No significant fatigue  - No appetite changes  - Sleep is adequate    Cardiac symptoms  - Occasional palpitations    Genitourinary symptoms  - History of fibroids causing urinary urgency    Respiratory and gastrointestinal symptoms  - No significant respiratory symptoms  - No significant gastrointestinal  symptoms    Neurological symptoms  - History of migraines, resolved with treatment    ECOG PS 0    Review of Systems:   Review of Systems - Oncology  10 point ROS pertinent per HPI.        Current Medications[1]    Allergies:   Allergies[2]    Past Medical History[3]    Past Surgical History[4]    Short Social Hx on File[5]      Family History[6]      PHYSICAL EXAM:    /74   Pulse 87   Resp 16   Wt 75.8 kg (167 lb)   SpO2 93%   BMI 29.41 kg/m²   Wt Readings from Last 6 Encounters:   07/16/25 75.8 kg (167 lb)   07/08/25 75.9 kg (167 lb 6.4 oz)   06/18/25 76.2 kg (168 lb)   04/24/25 75.8 kg (167 lb)   03/31/25 75.3 kg (166 lb)   01/09/25 76.7 kg (169 lb)     Physical Exam  Physical Exam  GENERAL: Alert, cooperative, well developed, no acute distress.  HEENT: Normocephalic, normal oropharynx, moist mucous membranes.  NECK: No cervical, occipital, submental, or supraclavicular lymphadenopathy. No axillary lymphadenopathy bilaterally.  CHEST: Clear to auscultation bilaterally, no wheezes, rhonchi, or crackles.  CARDIOVASCULAR: Regular rate and rhythm, S1 and S2 normal without murmurs.  BREAST: Breasts examined bilaterally, no abnormalities noted. Left breast at 3 o'clock shows resolving ecchymosis and palpable hematoma.  ABDOMEN: Soft, non-tender, non-distended, without organomegaly, normal bowel sounds.  EXTREMITIES: No cyanosis or edema.  NEUROLOGICAL: Cranial nerves grossly intact, moves all extremities without gross motor or sensory deficit.        ASSESSMENT/PLAN:     1. Malignant neoplasm of lower-outer quadrant of left breast of female, estrogen receptor positive (HCC)       Cancer Staging   Malignant neoplasm of lower-outer quadrant of left breast of female, estrogen receptor positive (HCC)  Staging form: Breast, AJCC 8th Edition  - Clinical stage from 7/16/2025: Stage IIA (cT3, cN0, cM0, G2, ER+, TN+, HER2-) - Signed by Price Carlsno MD on 7/16/2025    Assessment & Plan  Left breast invasive solid  papillary carcinoma, ER/OK positive  Invasive solid papillary carcinoma of the left breast, ER/OK positive, HER2 negative, with an intermediate nuclear grade. The carcinoma is extensive, covering an area of approximately six centimeters, making breast conservation surgery unfeasible. Multiple biopsies confirmed the diagnosis. The tumor is hormone receptor-positive, indicating a favorable histology with a good prognosis. Genetic testing for hereditary breast cancer was negative, indicating no increased risk for contralateral breast cancer. She opted against immediate reconstruction due to concerns about potential complications such as infection.  - Perform mastectomy with sentinel lymph node biopsy on 7/29/2025  - Administer hormone therapy post-surgery for five years to reduce recurrence risk of distant recurrence in the future  - Evaluate the need for radiation therapy post-mastectomy based on final pathology, discussed that postmastectomy radiation will be recommended if the size of the invasive cancer is greater than or equal to 5 cm.  - Continue annual mammograms for the contralateral breast  - Monitor for symptoms of recurrence and manage accordingly  - Patient to return postoperatively to discuss final staging and management.      Recording duration: 50 minutes    MDM high risk    The following individual(s) Anastasia Gonzáles, verbally consented to be recorded using Ambient AI technology and understand that they can withdraw their consent to listening technology at any point by asking the clinician to turn off or pause the recording.       No orders of the defined types were placed in this encounter.      Results From Past 48 Hours:  No results found for this or any previous visit (from the past 48 hours).      Imaging & Referrals:  None   No orders of the defined types were placed in this encounter.    Results  LABS    Hereditary breast cancer panel by [facility]: Negative (07/03/2025)    RADIOLOGY     Left breast ultrasound: Incidental cystic and solid mass without internal vascularity at six o'clock, subareolar region, measuring 10.8 x 10.3 x 12.9 mm with irregular borders (05/09/2023)    Left breast mammogram with ultrasound: Stable focal asymmetries of the left breast outer slightly lower quadrant (12/13/2023)    Bilateral diagnostic imaging with ultrasound: Left breast findings at three o'clock, seven centimeters from the nipple and ten centimeters from the nipple at four o'clock, twelve centimeters from the nipple, unchanged since May 2023, probably benign (06/18/2024)    Diagnostic bilateral mammogram: Stable post surgical changes and post biopsy changes in the left subareolar region, persistent tubular hypoechoic mass of the left breast outer quadrant with palpable area thickening, stable (06/11/2025)    DIAGNOSTIC    DEXA: Normal left femoral neck, total hip, and lumbar spine (06/03/2024)    PATHOLOGY    Left breast ultrasound-guided biopsy: No malignancy, no atypia (05/15/2023)    Ultrasound-guided biopsy of left breast: Solid papillary carcinoma, intermediate nuclear grade, involving 6/6 fragments measuring 11 mm, ER 95%, OR 99%, HER2 new 0, KI-67 7% (06/13/2025)    Ultrasound-guided biopsy of left breast: Four o'clock, twelve centimeters from the nipple, solid papillary carcinoma, intermediate grade, ER 90%, OR 98%, HER2 new 1+ negative, KI-67 4%; Three o'clock, ten centimeters from the nipple, solid papillary carcinoma, intermediate grade, ER 95%, OR 98%, HER2 0, KI-67 4% (06/23/2025)               [1]   Current Outpatient Medications   Medication Sig Dispense Refill    VITAMIN E OR       Omeprazole 40 MG Oral Capsule Delayed Release Take 1 capsule (40 mg total) by mouth daily. 30 capsule 0   [2] No Known Allergies  [3]   Past Medical History:   Biliary colic    acute    BRCA gene mutation negative in female    Negative 19 gene hereditary breast and gyn cancers panel ([facility]), report scanned into  media tab    Cholelithiasis    Diabetes (HCC)    not taking medicines    Ectopic pregnancy (HCC)    Migraines   [4]   Past Surgical History:  Procedure Laterality Date    Breast biopsy Left 02/18/2021    Left Breast Microductectomy    Cholecystectomy      Colonoscopy  2009    Colonoscopy N/A 08/18/2020    Procedure: COLONOSCOPY;  Surgeon: Desmond Valdez MD;  Location: [facility] ENDOSCOPY    Colonoscopy N/A 10/31/2023    Procedure: COLONOSCOPY;  Surgeon: Desmond Valdez MD;  Location: [facility] ENDOSCOPY    Electrocardiogram, complete  05/20/2014    scanned to media tab    Laparoscopy w cholangiogram  2014    Laparoscopic cholecystectomy w/ intraoperative cholangiogram    Marcia localization wire 1 site left (cpt=19281)  02/22/2021    DR WAGNER    Needle biopsy left      Needle biopsy left  06/13/2025    Upper gi endoscopy,biopsy  2015   [5]   Social History  Socioeconomic History    Marital status:    Tobacco Use    Smoking status: Never    Smokeless tobacco: Never   Vaping Use    Vaping status: Never Used   Substance and Sexual Activity    Alcohol use: No     Alcohol/week: 0.0 standard drinks of alcohol    Drug use: No    Sexual activity: Yes   Other Topics Concern    Caffeine Concern Yes     Comment: coffee, 24 oz./day    Exercise Yes     Comment: walk 2 times daily     Social Drivers of Health     Food Insecurity: No Food Insecurity (7/9/2025)    Received from [place] [facility]    Hunger Vital Sign     Worried About Running Out of Food in the Last Year: Never true     Ran Out of Food in the Last Year: Never true   Transportation Needs: No Transportation Needs (7/9/2025)    Received from [place] [facility]    PRAPARE - Transportation     Lack of Transportation (Medical): No     Lack of Transportation (Non-Medical): No   Housing Stability: Low Risk  (7/9/2025)    Received from [place] [facility]    Housing Stability Vital Sign     Unable to Pay for Housing in  the Last Year: No     Number of Times Moved in the Last Year: 1     Homeless in the Last Year: No   [6]   Family History  Problem Relation Age of Onset    Hypertension Mother     Arthritis Mother         rheumatoid    Psoriasis Mother     Diabetes Father     Diabetes Brother         per NG: \"four brothers have diabetes\"    Ovarian Cancer Maternal Cousin Female 50    Ovarian Cancer Maternal Cousin Female 60        d. 62y    Breast Cancer Paternal Cousin Female         40s    Genetic Disease Son         dx Down syndrome,  at 10y dt heart problem    Colon Cancer Neg     Prostate Cancer Neg     Pancreatic Cancer Neg

## 2025-07-17 ENCOUNTER — TELEPHONE (OUTPATIENT)
Age: 65
End: 2025-07-17

## 2025-07-17 DIAGNOSIS — C50.919 PRIMARY SOLID PAPILLARY CARCINOMA OF BREAST WITH INVASION (HCC): Primary | ICD-10-CM

## 2025-07-17 NOTE — TELEPHONE ENCOUNTER
NN phoned patient to check in and coordinate pre-op classes. Patient felt appointment with Dr. Carlson was very beneficial and she now has a better understanding of the plan/her diagnosis. NN discussed PT screening program as well as drain education class. NN offered to coordinate appointments for 7/24. Patient agreeable. NN instructed patient where to park and where to check in.     Patient appreciative of the call and encouraged to reach out with questions or concerns.

## 2025-07-24 ENCOUNTER — OFFICE VISIT (OUTPATIENT)
Dept: PHYSICAL THERAPY | Facility: HOSPITAL | Age: 65
End: 2025-07-24
Attending: INTERNAL MEDICINE
Payer: MEDICARE

## 2025-07-24 ENCOUNTER — APPOINTMENT (OUTPATIENT)
Facility: LOCATION | Age: 65
End: 2025-07-24
Attending: FAMILY MEDICINE
Payer: MEDICARE

## 2025-07-24 NOTE — PROGRESS NOTES
BREAST ONCOLOGY CANCER SCREENING     Diagnosis:   L breast cancer Patient:  Hermila Solorio (65 year old, female)        Referring Provider: No ref. provider found  Today's Date   2025    Precautions:  Cancer   Date of Screenin25  Date of Surgery: 2025     PATIENT SUMMARY     History of current condition: L breast cancer found with mammogram/US   Pain level: current -- (Pressure in lower abdomen, bloating/cramping), at best  , at worst    Description of symptoms:     Occupation:     Leisure activities/Hobbies:     Prior level of function: no limitations per report  Current limitations:      Hand Dominance: right  Do you live with someone who would be able to assist you: Yes  Self-Reported Weight:    Are you following the recommended diet from your physician: Yes    Past medical history was reviewed with Hermila.  Significant findings include:    Hermila  has a past medical history of Biliary colic, BRCA gene mutation negative in female (2025), Cancer (HCC), Cholelithiasis, Diabetes (), Disorder of liver, Ectopic pregnancy (HCC) (), Esophageal reflux, and Migraines.  She  has a past surgical history that includes cholecystectomy; laparoscopy w cholangiogram (); electrocardiogram, complete (2014); colonoscopy (); upper gi endoscopy,biopsy (); colonoscopy (N/A, 2020); Breast biopsy (Left, 2021); needle biopsy left; colonoscopy (N/A, 10/31/2023); terence localization wire 1 site left (cpt=19281) (2021); and needle biopsy left (2025).    ASSESSMENT  Hermila presents to physical therapy for rehabilitation screening.  The results of the objective tests and measures show  . Functional deficits include but are not limited to  .  Discussed with pt the screening's, pathology, and POC.  Pt voiced understanding of plan of care and agrees to participate in self-care including HEP and progression towards self-management.     OBJECTIVE:     Musculoskeletal  Posture:  good       ROM and Strength:  (* denotes performed with pain)  Shoulder   ROM MMT (-/5)    R L R L     Flex 165 170 4+ 4+     Ext             Abd (C5) 170 170 4+ 4+     IR 40 50 4 4     ER wfl wfl 4 4     Low Trap n/a         Mid Trap n/a         SA n/a         Swelling       7/24/2025   Self Care   Are you following the recommended diet from your physician? Yes         Lymphedema Measurements       7/24/2025   Lymphedema Calculations   DATE MEASURED 7/24/2025   LOCATION/MEASUREMENTS LUE   PATIENT POSITION  supine 1 pillow   LIMB POSITION 75 deg abduction   STARTING POINT 18cm from 3rd cuticle   RIGHT HAND VOLUME 20cm across palm   LEFT HAND VOLUME 20cm across palm   MEASUREMENT A 15.5   MEASUREMENT B 16.9   MEASUREMENT C 20.7   MEASUREMENT D 23   MEASUREMENT E 25.1   MEASUREMENT F 25.6   MEASUREMENT G 28.1   MEASUREMENT H 31.9   MEASUREMENT I 35.8    TOTAL VOLUME  2069.98337   DATE MEASURED 7/24/2025   LOCATION/MEASUREMENTS RUE   MEASUREMENT A 15.6   MEASUREMENT B 17.5   MEASUREMENT C 21.2   MEASUREMENT D 23.6   MEASUREMENT E 24.8   MEASUREMENT F 25.4   MEASUREMENT G 28   MEASUREMENT H 32.5   MEASUREMENT I 35.7    TOTAL VOLUME  2100.16865   % DIFFERENCE -[id number]      Today's Treatment and Response:   Pt education was provided on exam findings, treatment diagnosis, treatment plan, expectations, and prognosis.    Today's Treatment       7/24/2025   PT Treatment   # of Visits Used/Approved 1/3 complimentary screenings   Therapeutic Exercise Reviewed exercises patient can begin starting 1 week after surgery.  Keep arm elevation to shoulder height when drains are in and all exercises and activities in pain free range.  Do not lift, push or pull.    Pt's spouse present during today's session   Total of Timed Procedures 0   Total of Service Based 0   Total Treatment Time 0           Patient/Family/Caregiver was advised of these findings, precautions, and treatment options and has agreed to actively participate in planning  and for this course of care.    Thank you for your referral. Please co-sign or sign and return this letter via fax as soon as possible to [phone]. If you have any questions, please contact me at Dept: [phone]    Sincerely,  Electronically signed by therapist: Patience Smyth PTA

## 2025-07-29 ENCOUNTER — HOSPITAL ENCOUNTER (OUTPATIENT)
Facility: HOSPITAL | Age: 65
Setting detail: OBSERVATION
Discharge: HOME | End: 2025-07-30
Attending: SURGERY | Admitting: SURGERY

## 2025-07-29 ENCOUNTER — ANESTHESIA EVENT (OUTPATIENT)
Dept: SURGERY | Facility: HOSPITAL | Age: 65
End: 2025-07-29
Payer: MEDICARE

## 2025-07-29 ENCOUNTER — HOSPITAL ENCOUNTER (OUTPATIENT)
Dept: NUCLEAR MEDICINE | Facility: HOSPITAL | Age: 65
Discharge: HOME | End: 2025-07-29
Attending: FAMILY MEDICINE

## 2025-07-29 ENCOUNTER — ANESTHESIA (OUTPATIENT)
Dept: SURGERY | Facility: HOSPITAL | Age: 65
End: 2025-07-29
Payer: MEDICARE

## 2025-07-29 DIAGNOSIS — Z85.3 HISTORY OF LEFT BREAST CANCER: Primary | ICD-10-CM

## 2025-07-29 DIAGNOSIS — C50.412 MALIGNANT NEOPLASM OF UPPER-OUTER QUADRANT OF LEFT BREAST IN FEMALE, ESTROGEN RECEPTOR NEGATIVE (HCC): ICD-10-CM

## 2025-07-29 DIAGNOSIS — C50.812 MALIGNANT NEOPLASM OF OVERLAPPING SITES OF LEFT FEMALE BREAST, UNSPECIFIED ESTROGEN RECEPTOR STATUS (HCC): ICD-10-CM

## 2025-07-29 DIAGNOSIS — Z17.1 MALIGNANT NEOPLASM OF UPPER-OUTER QUADRANT OF LEFT BREAST IN FEMALE, ESTROGEN RECEPTOR NEGATIVE (HCC): ICD-10-CM

## 2025-07-29 LAB
GLUCOSE BLDC GLUCOMTR-MCNC: 145 MG/DL (ref 70–99)
GLUCOSE BLDC GLUCOMTR-MCNC: 149 MG/DL (ref 70–99)
GLUCOSE BLDC GLUCOMTR-MCNC: 164 MG/DL (ref 70–99)
GLUCOSE BLDC GLUCOMTR-MCNC: 191 MG/DL (ref 70–99)
GLUCOSE BLDC GLUCOMTR-MCNC: 247 MG/DL (ref 70–99)
GLUCOSE BLDC GLUCOMTR-MCNC: 259 MG/DL (ref 70–99)

## 2025-07-29 PROCEDURE — 38900 IO MAP OF SENT LYMPH NODE: CPT | Performed by: SURGERY

## 2025-07-29 PROCEDURE — 38525 BIOPSY/REMOVAL LYMPH NODES: CPT | Performed by: SURGERY

## 2025-07-29 PROCEDURE — 88325 CONSLTJ COMPRE RVW REC REPRT: CPT | Performed by: STUDENT IN AN ORGANIZED HEALTH CARE EDUCATION/TRAINING PROGRAM

## 2025-07-29 PROCEDURE — 19301 PARTIAL MASTECTOMY: CPT | Performed by: SURGERY

## 2025-07-29 PROCEDURE — 99222 1ST HOSP IP/OBS MODERATE 55: CPT | Performed by: HOSPITALIST

## 2025-07-29 PROCEDURE — 78195 LYMPH SYSTEM IMAGING: CPT | Performed by: SURGERY

## 2025-07-29 RX ORDER — ONDANSETRON 2 MG/ML
4 INJECTION INTRAMUSCULAR; INTRAVENOUS EVERY 6 HOURS PRN
Status: DISCONTINUED | OUTPATIENT
Start: 2025-07-29 | End: 2025-07-30

## 2025-07-29 RX ORDER — MORPHINE SULFATE 10 MG/ML
6 INJECTION, SOLUTION INTRAMUSCULAR; INTRAVENOUS EVERY 10 MIN PRN
Status: DISCONTINUED | OUTPATIENT
Start: 2025-07-29 | End: 2025-07-29 | Stop reason: HOSPADM

## 2025-07-29 RX ORDER — ISOSULFAN BLUE 50 MG/5ML
INJECTION, SOLUTION SUBCUTANEOUS AS NEEDED
Status: DISCONTINUED | OUTPATIENT
Start: 2025-07-29 | End: 2025-07-29 | Stop reason: HOSPADM

## 2025-07-29 RX ORDER — MORPHINE SULFATE 4 MG/ML
4 INJECTION, SOLUTION INTRAMUSCULAR; INTRAVENOUS EVERY 10 MIN PRN
Status: DISCONTINUED | OUTPATIENT
Start: 2025-07-29 | End: 2025-07-29 | Stop reason: HOSPADM

## 2025-07-29 RX ORDER — NALOXONE HYDROCHLORIDE 0.4 MG/ML
0.08 INJECTION, SOLUTION INTRAMUSCULAR; INTRAVENOUS; SUBCUTANEOUS AS NEEDED
Status: DISCONTINUED | OUTPATIENT
Start: 2025-07-29 | End: 2025-07-29 | Stop reason: HOSPADM

## 2025-07-29 RX ORDER — NICOTINE POLACRILEX 4 MG
30 LOZENGE BUCCAL
Status: DISCONTINUED | OUTPATIENT
Start: 2025-07-29 | End: 2025-07-29 | Stop reason: HOSPADM

## 2025-07-29 RX ORDER — SODIUM CHLORIDE, SODIUM LACTATE, POTASSIUM CHLORIDE, CALCIUM CHLORIDE 600; 310; 30; 20 MG/100ML; MG/100ML; MG/100ML; MG/100ML
INJECTION, SOLUTION INTRAVENOUS CONTINUOUS
Status: DISCONTINUED | OUTPATIENT
Start: 2025-07-29 | End: 2025-07-30

## 2025-07-29 RX ORDER — HYDROMORPHONE HYDROCHLORIDE 1 MG/ML
0.4 INJECTION, SOLUTION INTRAMUSCULAR; INTRAVENOUS; SUBCUTANEOUS EVERY 5 MIN PRN
Status: DISCONTINUED | OUTPATIENT
Start: 2025-07-29 | End: 2025-07-29 | Stop reason: HOSPADM

## 2025-07-29 RX ORDER — MORPHINE SULFATE 2 MG/ML
1 INJECTION, SOLUTION INTRAMUSCULAR; INTRAVENOUS EVERY 2 HOUR PRN
Status: DISCONTINUED | OUTPATIENT
Start: 2025-07-29 | End: 2025-07-30

## 2025-07-29 RX ORDER — ACETAMINOPHEN 500 MG
1000 TABLET ORAL ONCE
Status: COMPLETED | OUTPATIENT
Start: 2025-07-29 | End: 2025-07-29

## 2025-07-29 RX ORDER — HYDROMORPHONE HYDROCHLORIDE 1 MG/ML
0.6 INJECTION, SOLUTION INTRAMUSCULAR; INTRAVENOUS; SUBCUTANEOUS EVERY 5 MIN PRN
Status: DISCONTINUED | OUTPATIENT
Start: 2025-07-29 | End: 2025-07-29 | Stop reason: HOSPADM

## 2025-07-29 RX ORDER — MORPHINE SULFATE 2 MG/ML
2 INJECTION, SOLUTION INTRAMUSCULAR; INTRAVENOUS EVERY 2 HOUR PRN
Status: DISCONTINUED | OUTPATIENT
Start: 2025-07-29 | End: 2025-07-30

## 2025-07-29 RX ORDER — MORPHINE SULFATE 4 MG/ML
2 INJECTION, SOLUTION INTRAMUSCULAR; INTRAVENOUS EVERY 10 MIN PRN
Status: DISCONTINUED | OUTPATIENT
Start: 2025-07-29 | End: 2025-07-29 | Stop reason: HOSPADM

## 2025-07-29 RX ORDER — NICOTINE POLACRILEX 4 MG
15 LOZENGE BUCCAL
Status: DISCONTINUED | OUTPATIENT
Start: 2025-07-29 | End: 2025-07-29 | Stop reason: HOSPADM

## 2025-07-29 RX ORDER — ONDANSETRON 2 MG/ML
INJECTION INTRAMUSCULAR; INTRAVENOUS AS NEEDED
Status: DISCONTINUED | OUTPATIENT
Start: 2025-07-29 | End: 2025-07-29 | Stop reason: SURG

## 2025-07-29 RX ORDER — METOCLOPRAMIDE HYDROCHLORIDE 5 MG/ML
10 INJECTION INTRAMUSCULAR; INTRAVENOUS EVERY 8 HOURS PRN
Status: DISCONTINUED | OUTPATIENT
Start: 2025-07-29 | End: 2025-07-29 | Stop reason: HOSPADM

## 2025-07-29 RX ORDER — LIDOCAINE HYDROCHLORIDE 10 MG/ML
INJECTION, SOLUTION EPIDURAL; INFILTRATION; INTRACAUDAL; PERINEURAL AS NEEDED
Status: DISCONTINUED | OUTPATIENT
Start: 2025-07-29 | End: 2025-07-29 | Stop reason: SURG

## 2025-07-29 RX ORDER — BUPIVACAINE HCL/EPINEPHRINE 0.25-.0005
VIAL (ML) INJECTION AS NEEDED
Status: DISCONTINUED | OUTPATIENT
Start: 2025-07-29 | End: 2025-07-29 | Stop reason: HOSPADM

## 2025-07-29 RX ORDER — HYDROCODONE BITARTRATE AND ACETAMINOPHEN 5; 325 MG/1; MG/1
1 TABLET ORAL EVERY 4 HOURS PRN
Status: DISCONTINUED | OUTPATIENT
Start: 2025-07-29 | End: 2025-07-30

## 2025-07-29 RX ORDER — ONDANSETRON 2 MG/ML
4 INJECTION INTRAMUSCULAR; INTRAVENOUS EVERY 6 HOURS PRN
Status: DISCONTINUED | OUTPATIENT
Start: 2025-07-29 | End: 2025-07-29 | Stop reason: HOSPADM

## 2025-07-29 RX ORDER — HYDROCODONE BITARTRATE AND ACETAMINOPHEN 5; 325 MG/1; MG/1
2 TABLET ORAL EVERY 4 HOURS PRN
Status: DISCONTINUED | OUTPATIENT
Start: 2025-07-29 | End: 2025-07-30

## 2025-07-29 RX ORDER — DEXAMETHASONE SODIUM PHOSPHATE 4 MG/ML
VIAL (ML) INJECTION AS NEEDED
Status: DISCONTINUED | OUTPATIENT
Start: 2025-07-29 | End: 2025-07-29 | Stop reason: SURG

## 2025-07-29 RX ORDER — DEXTROSE MONOHYDRATE 25 G/50ML
50 INJECTION, SOLUTION INTRAVENOUS
Status: DISCONTINUED | OUTPATIENT
Start: 2025-07-29 | End: 2025-07-29 | Stop reason: HOSPADM

## 2025-07-29 RX ORDER — PANTOPRAZOLE SODIUM 40 MG/1
40 TABLET, DELAYED RELEASE ORAL
Status: DISCONTINUED | OUTPATIENT
Start: 2025-07-30 | End: 2025-07-30

## 2025-07-29 RX ORDER — MORPHINE SULFATE 4 MG/ML
4 INJECTION, SOLUTION INTRAMUSCULAR; INTRAVENOUS EVERY 2 HOUR PRN
Status: DISCONTINUED | OUTPATIENT
Start: 2025-07-29 | End: 2025-07-30

## 2025-07-29 RX ORDER — HYDROMORPHONE HYDROCHLORIDE 1 MG/ML
0.2 INJECTION, SOLUTION INTRAMUSCULAR; INTRAVENOUS; SUBCUTANEOUS EVERY 5 MIN PRN
Status: DISCONTINUED | OUTPATIENT
Start: 2025-07-29 | End: 2025-07-29 | Stop reason: HOSPADM

## 2025-07-29 RX ORDER — SODIUM CHLORIDE, SODIUM LACTATE, POTASSIUM CHLORIDE, CALCIUM CHLORIDE 600; 310; 30; 20 MG/100ML; MG/100ML; MG/100ML; MG/100ML
INJECTION, SOLUTION INTRAVENOUS CONTINUOUS
Status: DISCONTINUED | OUTPATIENT
Start: 2025-07-29 | End: 2025-07-29 | Stop reason: HOSPADM

## 2025-07-29 RX ORDER — ROCURONIUM BROMIDE 10 MG/ML
INJECTION, SOLUTION INTRAVENOUS AS NEEDED
Status: DISCONTINUED | OUTPATIENT
Start: 2025-07-29 | End: 2025-07-29 | Stop reason: SURG

## 2025-07-29 RX ADMIN — LIDOCAINE HYDROCHLORIDE 50 MG: 10 INJECTION, SOLUTION EPIDURAL; INFILTRATION; INTRACAUDAL; PERINEURAL at 10:33:00

## 2025-07-29 RX ADMIN — DEXAMETHASONE SODIUM PHOSPHATE 8 MG: 4 MG/ML VIAL (ML) INJECTION at 10:48:00

## 2025-07-29 RX ADMIN — ONDANSETRON 4 MG: 2 INJECTION INTRAMUSCULAR; INTRAVENOUS at 10:48:00

## 2025-07-29 RX ADMIN — ROCURONIUM BROMIDE 50 MG: 10 INJECTION, SOLUTION INTRAVENOUS at 10:35:00

## 2025-07-30 VITALS
TEMPERATURE: 98 F | WEIGHT: 165 LBS | DIASTOLIC BLOOD PRESSURE: 78 MMHG | RESPIRATION RATE: 18 BRPM | OXYGEN SATURATION: 99 % | HEART RATE: 75 BPM | HEIGHT: 63 IN | BODY MASS INDEX: 29.23 KG/M2 | SYSTOLIC BLOOD PRESSURE: 123 MMHG

## 2025-07-30 LAB
EST. AVERAGE GLUCOSE BLD GHB EST-MCNC: 169 MG/DL (ref 68–126)
GLUCOSE BLDC GLUCOMTR-MCNC: 173 MG/DL (ref 70–99)
HBA1C MFR BLD: 7.5 % (ref ?–5.7)

## 2025-07-30 PROCEDURE — 99239 HOSP IP/OBS DSCHRG MGMT >30: CPT | Performed by: STUDENT IN AN ORGANIZED HEALTH CARE EDUCATION/TRAINING PROGRAM

## 2025-07-30 RX ORDER — IBUPROFEN 600 MG/1
600 TABLET, FILM COATED ORAL EVERY 6 HOURS PRN
Qty: 15 TABLET | Refills: 1 | Status: SHIPPED | OUTPATIENT
Start: 2025-07-30 | End: 2025-08-06

## 2025-07-30 RX ORDER — HYDROCODONE BITARTRATE AND ACETAMINOPHEN 5; 325 MG/1; MG/1
1 TABLET ORAL EVERY 6 HOURS PRN
Qty: 20 TABLET | Refills: 0 | Status: SHIPPED | OUTPATIENT
Start: 2025-07-30 | End: 2025-08-14

## 2025-08-04 ENCOUNTER — OFFICE VISIT (OUTPATIENT)
Dept: SURGERY | Facility: CLINIC | Age: 65
End: 2025-08-04

## 2025-08-04 VITALS — DIASTOLIC BLOOD PRESSURE: 81 MMHG | SYSTOLIC BLOOD PRESSURE: 157 MMHG

## 2025-08-04 DIAGNOSIS — F32.0 MILD MAJOR DEPRESSION, SINGLE EPISODE: ICD-10-CM

## 2025-08-04 DIAGNOSIS — Z48.89 ENCOUNTER FOR POSTOPERATIVE CARE: Primary | ICD-10-CM

## 2025-08-04 PROCEDURE — 1125F AMNT PAIN NOTED PAIN PRSNT: CPT

## 2025-08-04 PROCEDURE — 3079F DIAST BP 80-89 MM HG: CPT

## 2025-08-04 PROCEDURE — 99024 POSTOP FOLLOW-UP VISIT: CPT

## 2025-08-04 PROCEDURE — 1160F RVW MEDS BY RX/DR IN RCRD: CPT

## 2025-08-04 PROCEDURE — 1159F MED LIST DOCD IN RCRD: CPT

## 2025-08-04 PROCEDURE — 1111F DSCHRG MED/CURRENT MED MERGE: CPT

## 2025-08-04 PROCEDURE — 3077F SYST BP >= 140 MM HG: CPT

## 2025-08-06 ENCOUNTER — APPOINTMENT (OUTPATIENT)
Facility: LOCATION | Age: 65
End: 2025-08-06
Attending: FAMILY MEDICINE

## 2025-08-06 ENCOUNTER — TELEPHONE (OUTPATIENT)
Facility: LOCATION | Age: 65
End: 2025-08-06

## 2025-08-13 ENCOUNTER — TELEPHONE (OUTPATIENT)
Facility: LOCATION | Age: 65
End: 2025-08-13

## 2025-08-14 ENCOUNTER — OFFICE VISIT (OUTPATIENT)
Dept: SURGERY | Facility: CLINIC | Age: 65
End: 2025-08-14

## 2025-08-14 VITALS — DIASTOLIC BLOOD PRESSURE: 61 MMHG | SYSTOLIC BLOOD PRESSURE: 129 MMHG | HEART RATE: 74 BPM

## 2025-08-14 DIAGNOSIS — Z48.89 ENCOUNTER FOR POSTOPERATIVE CARE: Primary | ICD-10-CM

## 2025-08-14 PROCEDURE — 3078F DIAST BP <80 MM HG: CPT

## 2025-08-14 PROCEDURE — 1160F RVW MEDS BY RX/DR IN RCRD: CPT

## 2025-08-14 PROCEDURE — 99024 POSTOP FOLLOW-UP VISIT: CPT

## 2025-08-14 PROCEDURE — 1170F FXNL STATUS ASSESSED: CPT

## 2025-08-14 PROCEDURE — 1111F DSCHRG MED/CURRENT MED MERGE: CPT

## 2025-08-14 PROCEDURE — 3074F SYST BP LT 130 MM HG: CPT

## 2025-08-14 PROCEDURE — 1159F MED LIST DOCD IN RCRD: CPT

## 2025-08-19 ENCOUNTER — OFFICE VISIT (OUTPATIENT)
Facility: LOCATION | Age: 65
End: 2025-08-19
Attending: FAMILY MEDICINE

## 2025-08-19 VITALS
DIASTOLIC BLOOD PRESSURE: 81 MMHG | RESPIRATION RATE: 16 BRPM | SYSTOLIC BLOOD PRESSURE: 146 MMHG | TEMPERATURE: 99 F | OXYGEN SATURATION: 97 % | BODY MASS INDEX: 28.85 KG/M2 | HEIGHT: 63 IN | HEART RATE: 79 BPM | WEIGHT: 162.81 LBS

## 2025-08-19 DIAGNOSIS — D05.82 PAPILLARY CARCINOMA IN SITU OF LEFT BREAST: Primary | ICD-10-CM

## 2025-08-19 RX ORDER — TAMOXIFEN CITRATE 20 MG/1
20 TABLET ORAL DAILY
Qty: 30 TABLET | Refills: 6 | Status: SHIPPED | OUTPATIENT
Start: 2025-08-19

## 2025-08-26 ENCOUNTER — OFFICE VISIT (OUTPATIENT)
Dept: PHYSICAL THERAPY | Facility: HOSPITAL | Age: 65
End: 2025-08-26
Attending: INTERNAL MEDICINE

## 2025-08-29 ENCOUNTER — OFFICE VISIT (OUTPATIENT)
Dept: OBGYN CLINIC | Facility: CLINIC | Age: 65
End: 2025-08-29

## 2025-08-29 VITALS — DIASTOLIC BLOOD PRESSURE: 86 MMHG | BODY MASS INDEX: 28 KG/M2 | SYSTOLIC BLOOD PRESSURE: 152 MMHG | WEIGHT: 157 LBS

## 2025-08-29 DIAGNOSIS — Z85.3 HISTORY OF LEFT BREAST CANCER: ICD-10-CM

## 2025-08-29 DIAGNOSIS — R35.0 URINARY FREQUENCY: ICD-10-CM

## 2025-08-29 DIAGNOSIS — Z12.4 SCREENING FOR CERVICAL CANCER: ICD-10-CM

## 2025-08-29 DIAGNOSIS — D25.1 INTRAMURAL LEIOMYOMA OF UTERUS: ICD-10-CM

## 2025-08-29 DIAGNOSIS — R10.2 PELVIC PAIN: Primary | ICD-10-CM

## 2025-08-29 LAB
BILIRUB UR QL: NEGATIVE
CLARITY UR: CLEAR
COLOR UR: COLORLESS
GLUCOSE UR-MCNC: NORMAL MG/DL
HGB UR QL STRIP.AUTO: NEGATIVE
KETONES UR-MCNC: NEGATIVE MG/DL
LEUKOCYTE ESTERASE UR QL STRIP.AUTO: NEGATIVE
NITRITE UR QL STRIP.AUTO: NEGATIVE
PH UR: 6.5 (ref 5–8)
PROT UR-MCNC: NEGATIVE MG/DL
SP GR UR STRIP: <1.005 (ref 1–1.03)
UROBILINOGEN UR STRIP-ACNC: NORMAL

## 2025-08-29 PROCEDURE — 3077F SYST BP >= 140 MM HG: CPT | Performed by: STUDENT IN AN ORGANIZED HEALTH CARE EDUCATION/TRAINING PROGRAM

## 2025-08-29 PROCEDURE — 1159F MED LIST DOCD IN RCRD: CPT | Performed by: STUDENT IN AN ORGANIZED HEALTH CARE EDUCATION/TRAINING PROGRAM

## 2025-08-29 PROCEDURE — 99214 OFFICE O/P EST MOD 30 MIN: CPT | Performed by: STUDENT IN AN ORGANIZED HEALTH CARE EDUCATION/TRAINING PROGRAM

## 2025-08-29 PROCEDURE — 3079F DIAST BP 80-89 MM HG: CPT | Performed by: STUDENT IN AN ORGANIZED HEALTH CARE EDUCATION/TRAINING PROGRAM

## 2025-08-29 PROCEDURE — 1111F DSCHRG MED/CURRENT MED MERGE: CPT | Performed by: STUDENT IN AN ORGANIZED HEALTH CARE EDUCATION/TRAINING PROGRAM

## 2025-08-29 PROCEDURE — 1160F RVW MEDS BY RX/DR IN RCRD: CPT | Performed by: STUDENT IN AN ORGANIZED HEALTH CARE EDUCATION/TRAINING PROGRAM

## (undated) DEVICE — GLOVE SUR 8 SENSICARE PI PIP CRM PWD F

## (undated) DEVICE — DRAIN SILICONE FLAT 7MM

## (undated) DEVICE — HANDLE SUR BLU PLAS LT FLX SLIP ON ST DISP

## (undated) DEVICE — DRAIN SUR W10MMXL20CM SIL FULL PERF HUBLESS

## (undated) DEVICE — Device: Brand: DEFENDO AIR/WATER/SUCTION AND BIOPSY VALVE

## (undated) DEVICE — PACK CDS MINOR GENERAL

## (undated) DEVICE — SUTURE MONOCRYL 3-0 Y497G

## (undated) DEVICE — Device

## (undated) DEVICE — GLOVE SUR 8 SENSICARE PI PIP GRN PWD F

## (undated) DEVICE — DRESSING FOAM 1IN 4MM H DISK CHG IMPREG AEGIS

## (undated) DEVICE — WECK HORIZON SMALL YELLOW CLIP DISP

## (undated) DEVICE — HEX-LOCKING BLADE ELECTRODE: Brand: EDGE

## (undated) DEVICE — BLADE ELECTRODE: Brand: EDGE

## (undated) DEVICE — COVER PRB 1X11.8IN ENDOCAVITY CLR E BND

## (undated) DEVICE — SNARE ENDOSCOPIC 10MM ROUND

## (undated) DEVICE — KIT CLEAN ENDOKIT 1.1OZ GOWNX2

## (undated) DEVICE — SUT ETHLN 3-0 30IN FS-1 NABSRB BLK 24MM 3/8 C

## (undated) DEVICE — SOL  .9 1000ML BTL

## (undated) DEVICE — FORCEP RADIAL JAW 4

## (undated) DEVICE — 60 ML SYRINGE REGULAR TIP: Brand: MONOJECT

## (undated) DEVICE — CLEANER ESURG TIP 2X2IN CAUT

## (undated) DEVICE — MEDI-VAC NON-CONDUCTIVE SUCTION TUBING 6MM X 1.8M (6FT.) L: Brand: CARDINAL HEALTH

## (undated) DEVICE — Device: Brand: DUAL NARE NASAL CANNULAE FEMALE LUER CON 7FT O2 TUBE

## (undated) DEVICE — DRAPE SHEET TRANSVERSE LAP

## (undated) DEVICE — TRAP POLYP W/ 2 SPEC TY CLR MAGNIFYING WIND

## (undated) DEVICE — SUTURE PDS II 3-0 SH

## (undated) DEVICE — SUT MCRYL 3-0 18IN PS-2 ABSRB UD 19MM 3/8 CIR

## (undated) DEVICE — GLOVE SUR 7 SENSICARE PI MIC PIP CRM PWD F

## (undated) DEVICE — MINOR GENERAL: Brand: MEDLINE INDUSTRIES, INC.

## (undated) DEVICE — WECK HORIZON MED BLUE CLIP DISP

## (undated) DEVICE — KIT ENDO ORCAPOD 160/180/190

## (undated) DEVICE — Device: Brand: CUSTOM PROCEDURE KIT

## (undated) DEVICE — STERILE LATEX POWDER-FREE SURGICAL GLOVESWITH NITRILE COATING: Brand: PROTEXIS

## (undated) DEVICE — LINE MNTR ADLT SET O2 INTMD

## (undated) DEVICE — SUTURE CHROMIC 2-0 801H

## (undated) DEVICE — DRAPE SUR W53XL77IN STD POLYPR SMS 3 QTR SHT

## (undated) DEVICE — EVACUATOR SUR 100CC SIL BLB WND

## (undated) DEVICE — ADHESIVE LIQ 2/3ML VI MASTISOL

## (undated) DEVICE — 35 ML SYRINGE REGULAR TIP: Brand: MONOJECT

## (undated) NOTE — MR AVS SNAPSHOT
[facility], [place]  [address] [place] [phone]               Thank you for choosing us for your health care visit with [place].  Marina Bunch MD.  We are glad to serve you and happy to provide you with this summary of your visit [address] [place], [address], [address] [place] [address] [address]. [employer] [facility] Demetris Willis  [address] [place] [address]    It is the patient's responsibility to check with and follow their insurance company Instructions and Information about Your Health     None      Allergies as of Jun 20, 2017     No Known Allergies                Today's Vital Signs     BP Height Weight BMI Breastfeeding?        129/85 mmHg 5' 5\" (1.651 m) 170 lb (77.111 kg) 28.29 kg/m2 No HOW TO GET STARTED: HOW TO STAY MOTIVATED:   Start activities slowly and build up over time Do what you like   Get your heart pumping – brisk walking, biking, swimming Even 10 minute increments are effective and add up over the week   2 ½ hours per week –

## (undated) NOTE — LETTER
8/27/2020              [address] [facility] [address]         Dear Abraham Ryder,    Thank you for again trusting me with your care.     Your recent colonoscopy exam at [facility] on 8/18/2020 showed diverticulosis of the co

## (undated) NOTE — LETTER
[address] [place] [address], [place]  Authorization for Surgical Operation and Procedure                                                                                           I hereby authorize Sheila Darnell MD, my physician and his/her assistants (if applicable), which may include medical students, residents, and/or fellows, to perform the following surgical operation/ procedure and administer such anesthesia as may be determined necessary by my physician: Operation/Procedure name (s) COLONOSCOPY on Tiana Thakkar   2. I recognize that during the surgical operation/procedure, unforeseen conditions may necessitate additional or different procedures than those listed above. I, therefore, further authorize and request that the above-named surgeon, assistants, or designees perform such procedures as are, in their judgment, necessary and desirable. 3.   My surgeon/physician has discussed prior to my surgery the potential benefits, risks and side effects of this procedure; the likelihood of achieving goals; and potential problems that might occur during recuperation. They also discussed reasonable alternatives to the procedure, including risks, benefits, and side effects related to the alternatives and risks related to not receiving this procedure. I have had all my questions answered and I acknowledge that no guarantee has been made as to the result that may be obtained. 4.   Should the need arise during my operation/procedure, which includes change of level of care prior to discharge, I also consent to the administration of blood and/or blood products. Further, I understand that despite careful testing and screening of blood or blood products by collecting agencies, I may still be subject to ill effects as a result of receiving a blood transfusion and/or blood products.   The following are some, but not all, of the potential risks that can occur: fever and allergic reactions, hemolytic reactions, transmission of diseases such as Hepatitis, AIDS and Cytomegalovirus (CMV) and fluid overload. In the event that I wish to have an autologous transfusion of my own blood, or a directed donor transfusion, I will discuss this with my physician. Check only if Refusing Blood or Blood Products  I understand refusal of blood or blood products as deemed necessary by my physician may have serious consequences to my condition to include possible death. I hereby assume responsibility for my refusal and release the hospital, its personnel, and my physicians from any responsibility for the consequences of my refusal.    o  Refuse   5. I authorize the use of any specimen, organs, tissues, body parts or foreign objects that may be removed from my body during the operation/procedure for diagnosis, research or teaching purposes and their subsequent disposal by hospital authorities. I also authorize the release of specimen test results and/or written reports to my treating physician on the hospital medical staff or other referring or consulting physicians involved in my care, at the discretion of the Pathologist or my treating physician. 6.   I consent to the photographing or videotaping of the operations or procedures to be performed, including appropriate portions of my body for medical, scientific, or educational purposes, provided my identity is not revealed by the pictures or by descriptive texts accompanying them. If the procedure has been photographed/videotaped, the surgeon will obtain the original picture, image, videotape or CD. The hospital will not be responsible for storage, release or maintenance of the picture, image, tape or CD.    7.   I consent to the presence of a  or observers in the operating room as deemed necessary by my physician or their designees.     8.   I recognize that in the event my procedure results in extended X-Ray/fluoroscopy time, I may develop a skin reaction. 9. If I have a Do Not Attempt Resuscitation (DNAR) order in place, that status will be suspended while in the operating room, procedural suite, and during the recovery period unless otherwise explicitly stated by me (or a person authorized to consent on my behalf). The surgeon or my attending physician will determine when the applicable recovery period ends for purposes of reinstating the DNAR order. 10. Patients having a sterilization procedure: I understand that if the procedure is successful the results will be permanent and it will therefore be impossible for me to inseminate, conceive, or bear children. I also understand that the procedure is intended to result in sterility, although the result has not been guaranteed. 11. I acknowledge that my physician has explained sedation/analgesia administration to me including the risk and benefits I consent to the administration of sedation/analgesia as may be necessary or desirable in the judgment of my physician. I CERTIFY THAT I HAVE READ AND FULLY UNDERSTAND THE ABOVE CONSENT TO OPERATION and/or OTHER PROCEDURE.     _________________________________________ _________________________________     ___________________________________  Signature of Patient     Signature of Responsible Person                   Printed Name of Responsible Person                              _________________________________________ ______________________________        ___________________________________  Signature of Witness         Date  Time         Relationship to Patient    STATEMENT OF PHYSICIAN My signature below affirms that prior to the time of the procedure; I have explained to the patient and/or his/her legal representative, the risks and benefits involved in the proposed treatment and any reasonable alternative to the proposed treatment.  I have also explained the risks and benefits involved in refusal of the proposed treatment and alternatives to the proposed treatment and have answered the patient's questions.  If I have a significant financial interest in a co-management agreement or a significant financial interest in any product or implant, or other significant relationship used in this procedure/surgery, I have disclosed this and had a discussion with my patient.     _______________________________________________________________ _____________________________  (Signature of Physician)                                                                                         (Date)                                   (Time)  Patient Name: Billie Franco    : 1960   Printed: 10/27/2023      Medical Record #: [id number]                                              Page 1 of 1

## (undated) NOTE — LETTER
[address]  Authorization for Surgical Operation and Procedure                                                                                           I hereby authorize DR. Peggy Bojorquez, my physician and his/her assistants (if applicable), which may include medical students, residents, and/or fellows, to perform the following surgical operation/ procedure and administer such anesthesia as may be determined necessary by my physician: ULTRASOUND GUIDED LEFT BREAST BIOPSY WITH CLIP PLACEMENT on [address] [place]   2. I recognize that during the surgical operation/procedure, unforeseen conditions may necessitate additional or different procedures than those listed above. I, therefore, further authorize and request that the above-named surgeon, assistants, or designees perform such procedures as are, in their judgment, necessary and desirable. 3.   My surgeon/physician has discussed prior to my surgery the potential benefits, risks and side effects of this procedure; the likelihood of achieving goals; and potential problems that might occur during recuperation. They also discussed reasonable alternatives to the procedure, including risks, benefits, and side effects related to the alternatives and risks related to not receiving this procedure. I have had all my questions answered and I acknowledge that no guarantee has been made as to the result that may be obtained. 4.   Should the need arise during my operation/procedure, which includes change of level of care prior to discharge, I also consent to the administration of blood and/or blood products. Further, I understand that despite careful testing and screening of blood or blood products by collecting agencies, I may still be subject to ill effects as a result of receiving a blood transfusion and/or blood products.   The following are some, but not all, of the potential risks that can occur: fever and allergic reactions, hemolytic reactions, transmission of diseases such as Hepatitis, AIDS and Cytomegalovirus (CMV) and fluid overload. In the event that I wish to have an autologous transfusion of my own blood, or a directed donor transfusion, I will discuss this with my physician. Check only if Refusing Blood or Blood Products  I understand refusal of blood or blood products as deemed necessary by my physician may have serious consequences to my condition to include possible death. I hereby assume responsibility for my refusal and release the hospital, its personnel, and my physicians from any responsibility for the consequences of my refusal.    o  Refuse   5. I authorize the use of any specimen, organs, tissues, body parts or foreign objects that may be removed from my body during the operation/procedure for diagnosis, research or teaching purposes and their subsequent disposal by hospital authorities. I also authorize the release of specimen test results and/or written reports to my treating physician on the hospital medical staff or other referring or consulting physicians involved in my care, at the discretion of the Pathologist or my treating physician. 6.   I consent to the photographing or videotaping of the operations or procedures to be performed, including appropriate portions of my body for medical, scientific, or educational purposes, provided my identity is not revealed by the pictures or by descriptive texts accompanying them. If the procedure has been photographed/videotaped, the surgeon will obtain the original picture, image, videotape or CD. The hospital will not be responsible for storage, release or maintenance of the picture, image, tape or CD.    7.   I consent to the presence of a  or observers in the operating room as deemed necessary by my physician or their designees.     8.   I recognize that in the event my procedure results in extended X-Ray/fluoroscopy time, I may develop a skin reaction. 9. If I have a Do Not Attempt Resuscitation (DNAR) order in place, that status will be suspended while in the operating room, procedural suite, and during the recovery period unless otherwise explicitly stated by me (or a person authorized to consent on my behalf). The surgeon or my attending physician will determine when the applicable recovery period ends for purposes of reinstating the DNAR order. 10. Patients having a sterilization procedure: I understand that if the procedure is successful the results will be permanent and it will therefore be impossible for me to inseminate, conceive, or bear children. I also understand that the procedure is intended to result in sterility, although the result has not been guaranteed. 11. I acknowledge that my physician has explained sedation/analgesia administration to me including the risk and benefits I consent to the administration of sedation/analgesia as may be necessary or desirable in the judgment of my physician. I CERTIFY THAT I HAVE READ AND FULLY UNDERSTAND THE ABOVE CONSENT TO OPERATION and/or OTHER PROCEDURE.     _________________________________________ _________________________________     ___________________________________  Signature of Patient     Signature of Responsible Person                   Printed Name of Responsible Person                              _________________________________________ ______________________________        ___________________________________  Signature of Witness         Date  Time         Relationship to Patient    STATEMENT OF PHYSICIAN My signature below affirms that prior to the time of the procedure; I have explained to the patient and/or his/her legal representative, the risks and benefits involved in the proposed treatment and any reasonable alternative to the proposed treatment.  I have also explained the risks and benefits involved in refusal of the proposed treatment and alternatives to the proposed treatment and have answered the patient's questions.  If I have a significant financial interest in a co-management agreement or a significant financial interest in any product or implant, or other significant relationship used in this procedure/surgery, I have disclosed this and had a discussion with my patient.     _______________________________________________________________ _____________________________  (Signature of Physician)                                                                                         (Date)                                   (Time)  Patient Name: Yi Dale    : 1960   Printed: 2023      Medical Record #: [id number]                                              Page 1 of 1

## (undated) NOTE — LETTER
[facility]  [address]  [facility] [place] [address]  [phone]  Authorization for Imaging Procedure    I hereby authorize                                         , my physician and his/her assistants (if applicable), which may include medical students, residents, and/or fellows, to perform the following procedure and administer such anesthesia as may be determined necessary by my physician: ULTRASOUND GUIDED LEFT BREAST BIOPSY WITH CLIP PLACEMENT on Hermila Solorio.   2.  I recognize that during the procedure, unforeseen conditions may necessitate additional or different procedures than those listed above. I, therefore, further authorize and request that the above-named physician, assistants, or designees perform such procedures as are, in their judgment, necessary and desirable.    3.  My physician has discussed prior to my procedure the potential benefits, risks and side effects of this procedure; the likelihood of achieving goals; and potential problems that might occur during recuperation. They also discussed reasonable alternatives to the procedure, including risks, benefits, and side effects related to the alternatives and risks related to not receiving this procedure. I have had all my questions answered and I acknowledge that no guarantee has been made as to the result that may be obtained.    4.  Should the need arise during my procedure, which includes change of level of care prior to discharge, I also consent to the administration of blood and/or blood products. Further, I understand that despite careful testing and screening of blood or blood products by collecting agencies, I may still be subject to ill effects as a result of receiving a blood transfusion and/or blood products. The following are some, but not all, of the potential risks that can occur: fever and allergic reactions, hemolytic reactions, transmission of diseases such as Hepatitis, AIDS and  Cytomegalovirus (CMV) and fluid overload. In the event that I wish to have an autologous transfusion of my own blood, or a directed donor transfusion, I will discuss this with my physician.  Check only if Refusing Blood or Blood Products  I understand refusal of blood or blood products as deemed necessary by my physician may have serious consequences to my condition to include possible death. I hereby assume responsibility for my refusal and release the hospital, its personnel, and my physicians from any responsibility for the consequences of my refusal.   [  ] Patient Refuses Blood      5.  I authorize the use of any specimen, organs, tissues, body parts or foreign objects that may be removed from my body during the procedure for diagnosis, research or teaching purposes and their subsequent disposal by hospital authorities. I also authorize the release of specimen test results and/or written reports to my treating physician on the hospital medical staff or other referring or consulting physicians involved in my care, at the discretion of the Pathologist or my treating physician.    6.  I consent to the photographing or videotaping of the procedures to be performed, including appropriate portions of my body for medical, scientific, or educational purposes, provided my identity is not revealed by the pictures or by descriptive texts accompanying them. If the procedure has been photographed/videotaped, the physician will obtain the original picture, image, videotape or CD. The hospital will not be responsible for storage, release or maintenance of the picture, image, tape or CD.   7.  I consent to the presence of a  or observers in the operating room as deemed necessary by my physician or their designees.    8.  I recognize that in the event my procedure results in extended X-Ray/fluoroscopy time, I may develop a skin reaction.    9.  If I have a Do Not Attempt Resuscitation (DNAR) order in place,  that status will be suspended while in the operating room, procedural suite, and during the recovery period unless otherwise explicitly stated by me (or a person authorized to consent on my behalf). The performing physician or my attending physician will determine when the applicable recovery period ends for purposes of reinstating the DNAR order.  10.  I acknowledge that my physician has explained sedation/analgesia administration to me including the risk and benefits I consent to the administration of sedation/analgesia as may be necessary or desirable in the judgment of my physician.      I CERTIFY THAT I HAVE READ AND FULLY UNDERSTAND THE ABOVE CONSENT FOR THE PROCEDURE.   Signature of Patient: _____________________________________________________________  Responsible person in case of minor, unconscious: ____________________________________  Relationship to patient:  __________________________________________________________  Signature of Witness: _______________________________Date: _________Time: __________    Statement of Physician: My signature below affirms that prior to the time of the procedure, I have explained to the patient and/or her guardian, the risks and benefits involved in the proposed treatment and any reasonable alternative to the proposed treatment. I have also explained the risks and benefits involved in the refusal of the proposed treatment and have answered the patient's questions. If I have a significant financial interest in a co-management agreement or a significant financial interest in any product or implant, or other significant relationship used in the procedure/surgery, I have disclosed this and had a discussion with my patient.  Signature of Physician:   _________________________________Date:_____________Time:________    Patient Name: Hermila Solorio : 1960  Printed: 2025   Medical Record #: [id number]

## (undated) NOTE — LETTER
[facility]  [address]  [facility] [address]  Authorization for Imaging Procedure  Date of Procedure:     I hereby authorize                               , my physician and his/her assistants (if applicable), which may include medical students, residents, and/or fellows, to perform the following procedure and administer such anesthesia as may be determined necessary by my physician: ULTRASOUND GUIDED THREE SITE LEFT BREAST BIOPSY WITH CLIP PLACEMENT TO EACH SITE on Hermila Solorio.   2.  I recognize that during the procedure, unforeseen conditions may necessitate additional or different procedures than those listed above. I, therefore, further authorize and request that the above-named physician, assistants, or designees perform such procedures as are, in their judgment, necessary and desirable.    3.  My physician has discussed prior to my procedure the potential benefits, risks and side effects of this procedure; the likelihood of achieving goals; and potential problems that might occur during recuperation. They also discussed reasonable alternatives to the procedure, including risks, benefits, and side effects related to the alternatives and risks related to not receiving this procedure. I have had all my questions answered and I acknowledge that no guarantee has been made as to the result that may be obtained.    4.  Should the need arise during my procedure, which includes change of level of care prior to discharge, I also consent to the administration of blood and/or blood products. Further, I understand that despite careful testing and screening of blood or blood products by collecting agencies, I may still be subject to ill effects as a result of receiving a blood transfusion and/or blood products. The following are some, but not all, of the potential risks that can occur: fever and allergic reactions, hemolytic reactions, transmission of diseases such as Hepatitis,  AIDS and Cytomegalovirus (CMV) and fluid overload. In the event that I wish to have an autologous transfusion of my own blood, or a directed donor transfusion, I will discuss this with my physician.  Check only if Refusing Blood or Blood Products  I understand refusal of blood or blood products as deemed necessary by my physician may have serious consequences to my condition to include possible death. I hereby assume responsibility for my refusal and release the hospital, its personnel, and my physicians from any responsibility for the consequences of my refusal.   [  ] Patient Refuses Blood      5.  I authorize the use of any specimen, organs, tissues, body parts or foreign objects that may be removed from my body during the procedure for diagnosis, research or teaching purposes and their subsequent disposal by hospital authorities. I also authorize the release of specimen test results and/or written reports to my treating physician on the hospital medical staff or other referring or consulting physicians involved in my care, at the discretion of the Pathologist or my treating physician.    6.  I consent to the photographing or videotaping of the procedures to be performed, including appropriate portions of my body for medical, scientific, or educational purposes, provided my identity is not revealed by the pictures or by descriptive texts accompanying them. If the procedure has been photographed/videotaped, the physician will obtain the original picture, image, videotape or CD. The hospital will not be responsible for storage, release or maintenance of the picture, image, tape or CD.   7.  I consent to the presence of a  or observers in the operating room as deemed necessary by my physician or their designees.    8.  I recognize that in the event my procedure results in extended X-Ray/fluoroscopy time, I may develop a skin reaction.    9.  If I have a Do Not Attempt Resuscitation (DNAR) order in  place, that status will be suspended while in the operating room, procedural suite, and during the recovery period unless otherwise explicitly stated by me (or a person authorized to consent on my behalf). The performing physician or my attending physician will determine when the applicable recovery period ends for purposes of reinstating the DNAR order.  10.  I acknowledge that my physician has explained sedation/analgesia administration to me including the risk and benefits I consent to the administration of sedation/analgesia as may be necessary or desirable in the judgment of my physician.      I CERTIFY THAT I HAVE READ AND FULLY UNDERSTAND THE ABOVE CONSENT FOR THE PROCEDURE.     Signature of Patient: _____________________________________________________________  Responsible person in case of minor, unconscious: ____________________________________  Relationship to patient:  __________________________________________________________  Signature of Witness: _______________________________Date: _________Time: __________    Statement of Physician: My signature below affirms that prior to the time of the procedure, I have explained to the patient and/or her guardian, the risks and benefits involved in the proposed treatment and any reasonable alternative to the proposed treatment. I have also explained the risks and benefits involved in the refusal of the proposed treatment and have answered the patient's questions. If I have a significant financial interest in a co-management agreement or a significant financial interest in any product or implant, or other significant relationship used in the procedure/surgery, I have disclosed this and had a discussion with my patient.  Signature of Physician:   _________________________________Date:_____________Time:________    Patient Name: Hermila Solorio YOB: 1960  Printed: 2025   Medical Record #: [id number]

## (undated) NOTE — MR AVS SNAPSHOT
After Visit Summary   6/29/2021    Dariana Wells    MRN: [id number]           Visit Information     Date & Time  6/29/2021  2:30 PM Provider  Donn Camilo MD [address], [facility] [address], [facility] Dept.  Phone  [phone] Click on the Activate your Account if you have an activation code in the box under the *New User? section. 3. Enter your Somewhere Activation Code exactly as it appears below. You will not need to use this code after you have completed the sign-up process. KNOW WHERE TO GO? Injury & Illness are never convenient. If you are dealing with a   non-emergency, consider your options before heading to an ER.   VIDEO VISITS  Visit face-to-face with a [facility] physician or   [facility] using your mobile device or computer   using

## (undated) NOTE — ED AVS SNAPSHOT
Ethan Patiñoab   MRN: [id number]    Department:  [facility] Emergency Department   Date of Visit:  3/14/2019           Disclosure     Insurance plans vary and the physician(s) referred by the ER may not be covered by your plan.  Please contact you CARE PHYSICIAN AT ONCE OR RETURN IMMEDIATELY TO THE EMERGENCY DEPARTMENT. If you have been prescribed any medication(s), please fill your prescription right away and begin taking the medication(s) as directed.   If you believe that any of the medications

## (undated) NOTE — MR AVS SNAPSHOT
[facility]  [address] [place] [address] [phone]               Thank you for choosing us for your health care visit with Clovis Cordoba MD.  We are glad to serve you and happy to provide you with this summary of your acquired. Please contact the Patient Business Office at [phone] if you have any questions related to insurance coverage. Thank you.          Reason for Today's Visit     Abdominal Pain     Bloating           Medical Issues Discussed Today     RUQ abdom

## (undated) NOTE — LETTER
08/29/17        Clarisa Smith  [address]      Dear Fredo Vidales,    [address] records indicate that you have outstanding lab work and or testing that was ordered for you and has not yet been completed: Hepatic Function Test (blood test to monitor pretty